# Patient Record
Sex: FEMALE | Race: OTHER
[De-identification: names, ages, dates, MRNs, and addresses within clinical notes are randomized per-mention and may not be internally consistent; named-entity substitution may affect disease eponyms.]

---

## 2017-12-01 ENCOUNTER — HOSPITAL ENCOUNTER (EMERGENCY)
Dept: HOSPITAL 8 - ED | Age: 22
LOS: 1 days | Discharge: HOME | End: 2017-12-02
Payer: COMMERCIAL

## 2017-12-01 VITALS — WEIGHT: 195.99 LBS | BODY MASS INDEX: 33.46 KG/M2 | HEIGHT: 64 IN

## 2017-12-01 VITALS — DIASTOLIC BLOOD PRESSURE: 92 MMHG | SYSTOLIC BLOOD PRESSURE: 155 MMHG

## 2017-12-01 DIAGNOSIS — L05.91: Primary | ICD-10-CM

## 2017-12-01 PROCEDURE — 46050 I&D PERIANAL ABSCESS SUPFC: CPT

## 2017-12-01 PROCEDURE — 99284 EMERGENCY DEPT VISIT MOD MDM: CPT

## 2017-12-03 ENCOUNTER — HOSPITAL ENCOUNTER (EMERGENCY)
Dept: HOSPITAL 8 - ED | Age: 22
Discharge: HOME | End: 2017-12-03
Payer: COMMERCIAL

## 2017-12-03 VITALS — SYSTOLIC BLOOD PRESSURE: 134 MMHG | DIASTOLIC BLOOD PRESSURE: 83 MMHG

## 2017-12-03 VITALS — WEIGHT: 196.43 LBS | HEIGHT: 64 IN | BODY MASS INDEX: 33.54 KG/M2

## 2017-12-03 DIAGNOSIS — L05.01: Primary | ICD-10-CM

## 2017-12-03 PROCEDURE — 99283 EMERGENCY DEPT VISIT LOW MDM: CPT

## 2018-08-20 ENCOUNTER — NON-PROVIDER VISIT (OUTPATIENT)
Dept: OBGYN | Facility: CLINIC | Age: 23
End: 2018-08-20

## 2018-08-20 DIAGNOSIS — Z32.01 PREGNANCY EXAMINATION OR TEST, POSITIVE RESULT: ICD-10-CM

## 2018-08-20 LAB
INT CON NEG: NEGATIVE
INT CON POS: POSITIVE
POC URINE PREGNANCY TEST: POSITIVE

## 2018-08-20 PROCEDURE — 81025 URINE PREGNANCY TEST: CPT | Performed by: OBSTETRICS & GYNECOLOGY

## 2018-08-28 ENCOUNTER — APPOINTMENT (OUTPATIENT)
Dept: OBGYN | Facility: CLINIC | Age: 23
End: 2018-08-28

## 2018-09-27 ENCOUNTER — OFFICE VISIT (OUTPATIENT)
Dept: URGENT CARE | Facility: CLINIC | Age: 23
End: 2018-09-27
Payer: COMMERCIAL

## 2018-09-27 VITALS
SYSTOLIC BLOOD PRESSURE: 132 MMHG | TEMPERATURE: 99.5 F | HEIGHT: 64 IN | BODY MASS INDEX: 35.51 KG/M2 | WEIGHT: 208 LBS | RESPIRATION RATE: 16 BRPM | DIASTOLIC BLOOD PRESSURE: 74 MMHG | OXYGEN SATURATION: 98 % | HEART RATE: 116 BPM

## 2018-09-27 DIAGNOSIS — L05.91 PILONIDAL CYST: ICD-10-CM

## 2018-09-27 PROCEDURE — 10060 I&D ABSCESS SIMPLE/SINGLE: CPT | Performed by: PHYSICIAN ASSISTANT

## 2018-09-27 RX ORDER — AMOXICILLIN AND CLAVULANATE POTASSIUM 875; 125 MG/1; MG/1
1 TABLET, FILM COATED ORAL 2 TIMES DAILY
Qty: 14 TAB | Refills: 0 | Status: SHIPPED | OUTPATIENT
Start: 2018-09-27 | End: 2018-10-04

## 2018-09-27 RX ORDER — ACETAMINOPHEN 500 MG
1000 TABLET ORAL ONCE
Status: COMPLETED | OUTPATIENT
Start: 2018-09-27 | End: 2018-09-27

## 2018-09-27 RX ADMIN — Medication 1000 MG: at 17:40

## 2018-09-27 ASSESSMENT — ENCOUNTER SYMPTOMS
PALPITATIONS: 0
ROS SKIN COMMENTS: INFECTED CYST
SHORTNESS OF BREATH: 0
FEVER: 0
COUGH: 0

## 2018-09-27 ASSESSMENT — PATIENT HEALTH QUESTIONNAIRE - PHQ9: CLINICAL INTERPRETATION OF PHQ2 SCORE: 0

## 2018-09-28 ENCOUNTER — TELEPHONE (OUTPATIENT)
Dept: OBGYN | Facility: CLINIC | Age: 23
End: 2018-09-28

## 2018-09-28 NOTE — PROGRESS NOTES
"Subjective:      Komal Hall is a 23 y.o. female who presents with Cyst (x4days, lower back, Pt. states she has had this in the same place over 10x.)            Cyst   This is a new problem. The current episode started in the past 7 days. The problem occurs daily. The problem has been unchanged. Pertinent negatives include no chest pain, coughing or fever. Nothing aggravates the symptoms. She has tried nothing for the symptoms.       Review of Systems   Constitutional: Negative for fever and malaise/fatigue.   Respiratory: Negative for cough and shortness of breath.    Cardiovascular: Negative for chest pain and palpitations.   Skin:        Infected cyst     All other systems reviewed and are negative.    PMH:  has no past medical history on file.  MEDS:   Current Outpatient Prescriptions:   •  amoxicillin-clavulanate (AUGMENTIN) 875-125 MG Tab, Take 1 Tab by mouth 2 times a day for 7 days., Disp: 14 Tab, Rfl: 0  •  hydrocodone-acetaminophen (NORCO) 5-325 MG Tab per tablet, Take 1-2 Tabs by mouth every 6 hours as needed., Disp: 20 Tab, Rfl: 0  •  cephALEXin (KEFLEX) 500 MG CAPS, Take 1 Cap by mouth 4 times a day., Disp: 40 Cap, Rfl: 0  •  hydrocodone-ibuprofen (VICOPROFEN) 7.5-200 MG per tablet, Take 1 Tab by mouth every 6 hours as needed for Severe Pain. Take with food, Disp: 20 Tab, Rfl: 0  ALLERGIES: No Known Allergies  SURGHX: History reviewed. No pertinent surgical history.  SOCHX:  reports that she has never smoked. She has never used smokeless tobacco. She reports that she does not drink alcohol or use drugs.  FH: Family history was reviewed, no pertinent findings to report  Medications, Allergies, and current problem list reviewed today in Epic       Objective:     /74 (BP Location: Right arm, Patient Position: Sitting, BP Cuff Size: Adult)   Pulse (!) 116   Temp 37.5 °C (99.5 °F) (Temporal)   Resp 16   Ht 1.626 m (5' 4\")   Wt 94.3 kg (208 lb)   SpO2 98%   BMI 35.70 kg/m²  " "    Physical Exam   Constitutional: She appears well-developed and well-nourished.   Cardiovascular: Normal rate, regular rhythm and normal heart sounds.    Pulmonary/Chest: Effort normal and breath sounds normal.   Skin: Skin is warm and dry. There is erythema.   Cyst above gluteal cleft     Psychiatric: She has a normal mood and affect. Her behavior is normal. Judgment and thought content normal.   Vitals reviewed.              Assessment/Plan:   Procedure: Incision and Drainage  -Risks, benefits, and alternatives discussed. Risks including infection, bleeding, nerve damage, and poor cosmetic outcome  -Sterile technique throughout  -Local anesthesia with 2% lidocaine with epinephrine  -Incision with #11 blade into fluctuant area with purulent material expressed  -Culture obtained and packaged for lab  -Cavity probed and any loculations bluntly taken down with hemostat  -Irrigated copiously with NS  -Packed with 1/4\" gauze  -Minimal bleeding with good hemostasis achieved  -The patient tolerated the procedure well    1. Pilonidal cyst    - amoxicillin-clavulanate (AUGMENTIN) 875-125 MG Tab; Take 1 Tab by mouth 2 times a day for 7 days.  Dispense: 14 Tab; Refill: 0  - acetaminophen (TYLENOL) tablet 1,000 mg; Take 2 Tabs by mouth Once.  - Follow up in 2 days for recheck.    Differential diagnosis, natural history, supportive care discussed. Follow-up with primary care provider within 7-10 days, emergency room precautions discussed.  Patient and/or family appears understanding of information.  Handout and review of patients diagnosis and treatment was discussed extensively.     "

## 2018-09-28 NOTE — TELEPHONE ENCOUNTER
Pt called asking about advice on medication she was prescribed. She didn't mention the name of it. I called her back and left a vm to call us again.

## 2018-10-02 ENCOUNTER — INITIAL PRENATAL (OUTPATIENT)
Dept: OBGYN | Facility: CLINIC | Age: 23
End: 2018-10-02
Payer: COMMERCIAL

## 2018-10-02 ENCOUNTER — HOSPITAL ENCOUNTER (OUTPATIENT)
Facility: MEDICAL CENTER | Age: 23
End: 2018-10-02
Attending: PHYSICIAN ASSISTANT
Payer: COMMERCIAL

## 2018-10-02 VITALS — BODY MASS INDEX: 36.05 KG/M2 | DIASTOLIC BLOOD PRESSURE: 82 MMHG | WEIGHT: 210 LBS | SYSTOLIC BLOOD PRESSURE: 122 MMHG

## 2018-10-02 DIAGNOSIS — Z34.02 SUPERVISION OF NORMAL FIRST PREGNANCY IN SECOND TRIMESTER: Primary | ICD-10-CM

## 2018-10-02 DIAGNOSIS — L05.91 PILONIDAL CYST: ICD-10-CM

## 2018-10-02 DIAGNOSIS — Z34.02 SUPERVISION OF NORMAL FIRST PREGNANCY IN SECOND TRIMESTER: ICD-10-CM

## 2018-10-02 LAB
APPEARANCE UR: NORMAL
BILIRUB UR STRIP-MCNC: NORMAL MG/DL
COLOR UR AUTO: NORMAL
GLUCOSE UR STRIP.AUTO-MCNC: NEGATIVE MG/DL
KETONES UR STRIP.AUTO-MCNC: NEGATIVE MG/DL
LEUKOCYTE ESTERASE UR QL STRIP.AUTO: NEGATIVE
NITRITE UR QL STRIP.AUTO: NEGATIVE
PH UR STRIP.AUTO: 6 [PH] (ref 5–8)
PROT UR QL STRIP: NEGATIVE MG/DL
RBC UR QL AUTO: NEGATIVE
SP GR UR STRIP.AUTO: 1.02
UROBILINOGEN UR STRIP-MCNC: NORMAL MG/DL

## 2018-10-02 PROCEDURE — 59401 PR NEW OB VISIT: CPT | Performed by: PHYSICIAN ASSISTANT

## 2018-10-02 PROCEDURE — 81002 URINALYSIS NONAUTO W/O SCOPE: CPT | Performed by: PHYSICIAN ASSISTANT

## 2018-10-02 PROCEDURE — 87591 N.GONORRHOEAE DNA AMP PROB: CPT

## 2018-10-02 PROCEDURE — 87491 CHLMYD TRACH DNA AMP PROBE: CPT

## 2018-10-02 NOTE — LETTER
Cystic Fibrosis Carrier Testing  Komal Hall    The following information is about a blood test that can be done to determine if you and/or your partner carry the gene for cystic fibrosis.    WHAT IS CYSTIC FIBROSIS?  · Cystic fibrosis (CF) is an inherited disease that affects more than 25,000 American children and young adults.  · Symptoms of CF vary but include lung congestion, pneumonia, diarrhea and poor growth.  Most people with CF have severe medical problems and some die at a young age.  Others have so few symptoms they are unaware they have CF.  · CF does not affect intelligence.  · Although there is no cure for CF at this time, scientists are making progress in improving treatment and in searching for a cure.  In the past many people with CF  at a very young age.  Today, many are living into their 20’s and 30’s.    IS THERE A CHANCE MY BABY COULD HAVE CYSTIC FIBROSIS?  · You can have a child with CF even if there is no history in your family (see chart below).  · CF testing can help determine if you are a carrier and at risk to have a child with CF.  Note: if both parents are carriers, there is a 1 in 4 (25%) chance with each pregnancy that they will have a child with CF.  · Carriers have one normal CF gene and one altered CF gene.  · People with CF have two altered CF genes.  · Most people have two normal copies of the CF gene.    Approximate risk that a couple with no family history of cystic fibrosis will have a child with cystic fibrosis:    Ethnic background / Risk     couple:  1 in 2,500   couple:  1 in 15,000            couple:  1 in 8,000     American couple:  1 in 32,000     WHAT TESTING IS AVAILABLE?  · There is a blood test that can be done to find out if you or your partner is a carrier.  · It is important to understand that CF carrier testing does not detect all CF carriers.  · If the test shows that you are both CF carriers, you unborn  baby can be tested to find out if the baby has CF.    HOW MUCH DOES IT COST TO HAVE CYSTIC FIBROSIS CARRIER TESTING?  · Cost and insurance coverage for CF carrier testing vary depending upon the laboratory used and your insurance policy.  · The average cost for CF carrier testing is $300 per person.  · Your genetic counselor can provide you with more information about cystic fibrosis carrier testing.    _____  Yes, I am interested in discussing carrier testing with a genetic counselor.    _____  No, I am not interested in CF carrier testing or in receiving more information about CF carrier testing.      Client signature: ________________________________________  10/2/2018

## 2018-10-02 NOTE — PROGRESS NOTES
Subjective:      Komal Hall is a 23 y.o. female who presents with new ob visit. Pt sure of LMP. Supportive FOB, Luis. First pregnancy. Pt has hx of pilonidal cysts, last drainage 3 days ago, and pt was instructed to take Augmentin, but wanted to wait until visit today - instructed pt to take, though no signs of infection. Pt last I&D was 7 mo ago. Pt denies other PMHx, SHx. No tob, etoh or drug use. NKDA. Taking PNV and will start Amoxicillin. Pt currently denies cramping, bleeding or pain. No FM.     Last PAP wnl 8/17 - records requested from Dr Frank.    PE: See below. Size c/w dates. +FHT by Doppler.    A/P: 1. IUP at 13w3d - GC/CT today. PNL slip given. US 5-7wk. RTC 4 wks.  2. Pilondial cysts - take Augmentin            HPI    ROS       Objective:     /82   Wt 95.3 kg (210 lb)   LMP 06/30/2018   BMI 36.05 kg/m²      Physical Exam   Constitutional: She appears well-developed and well-nourished.   HENT:   Head: Normocephalic and atraumatic.   Eyes: Pupils are equal, round, and reactive to light.   Neck: Normal range of motion. Neck supple. No thyromegaly present.   Cardiovascular: Normal rate, regular rhythm and normal heart sounds.    Pulmonary/Chest: Effort normal and breath sounds normal. No respiratory distress.   Abdominal: Soft. Bowel sounds are normal. She exhibits no distension. There is no tenderness.   Genitourinary: Vagina normal. Pelvic exam was performed with patient supine. There is no rash or tenderness on the right labia. There is no rash or tenderness on the left labia. Uterus is enlarged (Gravid, uterus c/w 13-14wk size). Uterus is not deviated and not tender. Cervix exhibits no motion tenderness. Right adnexum displays no mass and no tenderness. Left adnexum displays no mass and no tenderness. No erythema in the vagina. No foreign body in the vagina. No signs of injury around the vagina. No vaginal discharge found.   Neurological: She is alert. She has normal  reflexes.   Skin: Skin is warm and dry. No erythema.   Psychiatric: She has a normal mood and affect. Her behavior is normal. Thought content normal.   Vitals reviewed.              Assessment/Plan:     1. Supervision of normal first pregnancy in second trimester  - PREG CNTR PRENATAL PN; Future  - CHLAMYDIA/GC PCR URINE OR SWAB; Future  - US-OB 2ND 3RD TRI COMPLETE; Future  - POCT Urinalysis  - F/u 4 wk, US 4-6wk    2. Pilonidal cyst  - s/p drainage 3 days ago  - take Augmentin per MD

## 2018-10-02 NOTE — PROGRESS NOTES
Pt. Here for NOB visit today.  #[phone]  First prenatal care  Pt. states nausea and vomiting  Pharmacy verified  Pt desires AFP  PNP labs today  Chaperone offered and present

## 2018-10-03 LAB
C TRACH DNA SPEC QL NAA+PROBE: NEGATIVE
N GONORRHOEA DNA SPEC QL NAA+PROBE: NEGATIVE
SPECIMEN SOURCE: NORMAL

## 2018-10-24 ENCOUNTER — ROUTINE PRENATAL (OUTPATIENT)
Dept: OBGYN | Facility: CLINIC | Age: 23
End: 2018-10-24
Payer: COMMERCIAL

## 2018-10-24 VITALS — BODY MASS INDEX: 36.9 KG/M2 | DIASTOLIC BLOOD PRESSURE: 64 MMHG | SYSTOLIC BLOOD PRESSURE: 118 MMHG | WEIGHT: 215 LBS

## 2018-10-24 DIAGNOSIS — Z34.02 SUPERVISION OF NORMAL FIRST PREGNANCY IN SECOND TRIMESTER: ICD-10-CM

## 2018-10-24 PROCEDURE — 90040 PR PRENATAL FOLLOW UP: CPT | Performed by: PHYSICIAN ASSISTANT

## 2018-10-24 NOTE — PROGRESS NOTES
Pt here today for OB follow up  Pt states no complaints   Reports +FM  Good # [phone]  Pharmacy Confirmed.  Pt given AFP lab slip. PNP re printed and advised to do ASAP

## 2018-10-24 NOTE — PROGRESS NOTES
Pt has no complaints with cramping, bleeding or pain though pt has occ side pain if she isnt drinking enough water, so has incr water intake and feels better. No FM yet. GC/CT wnl - pt notified of results. PAP results never received from [facility], so MA to call today. Pt urged to do PNL asap, AFP slip given today with instructions and pt to do all labs by next week. US to be done on 11/19. Forgot to ask about flu vaccine - will reask next visit. RTC 4 wk or sooner prn. Has Centering appt 11/19.

## 2018-10-25 ENCOUNTER — HOSPITAL ENCOUNTER (OUTPATIENT)
Dept: LAB | Facility: MEDICAL CENTER | Age: 23
End: 2018-10-25
Attending: PHYSICIAN ASSISTANT
Payer: COMMERCIAL

## 2018-10-25 DIAGNOSIS — Z34.02 SUPERVISION OF NORMAL FIRST PREGNANCY IN SECOND TRIMESTER: ICD-10-CM

## 2018-10-25 LAB
ABO GROUP BLD: NORMAL
BACTERIA #/AREA URNS HPF: ABNORMAL /HPF
BLD GP AB SCN SERPL QL: NORMAL
EPI CELLS #/AREA URNS HPF: ABNORMAL /HPF
HIV 1+2 AB+HIV1 P24 AG SERPL QL IA: NON REACTIVE
HYALINE CASTS #/AREA URNS LPF: ABNORMAL /LPF
RBC # URNS HPF: ABNORMAL /HPF
RH BLD: NORMAL
WBC #/AREA URNS HPF: ABNORMAL /HPF

## 2018-10-25 PROCEDURE — 86850 RBC ANTIBODY SCREEN: CPT

## 2018-10-25 PROCEDURE — 86780 TREPONEMA PALLIDUM: CPT

## 2018-10-25 PROCEDURE — 86900 BLOOD TYPING SEROLOGIC ABO: CPT

## 2018-10-25 PROCEDURE — 85025 COMPLETE CBC W/AUTO DIFF WBC: CPT

## 2018-10-25 PROCEDURE — 86762 RUBELLA ANTIBODY: CPT

## 2018-10-25 PROCEDURE — 36415 COLL VENOUS BLD VENIPUNCTURE: CPT

## 2018-10-25 PROCEDURE — 86901 BLOOD TYPING SEROLOGIC RH(D): CPT

## 2018-10-25 PROCEDURE — 81511 FTL CGEN ABNOR FOUR ANAL: CPT

## 2018-10-25 PROCEDURE — 81001 URINALYSIS AUTO W/SCOPE: CPT

## 2018-10-25 PROCEDURE — 87389 HIV-1 AG W/HIV-1&-2 AB AG IA: CPT

## 2018-10-25 PROCEDURE — 87086 URINE CULTURE/COLONY COUNT: CPT

## 2018-10-25 PROCEDURE — 87340 HEPATITIS B SURFACE AG IA: CPT

## 2018-10-26 LAB
APPEARANCE UR: ABNORMAL
BASOPHILS # BLD AUTO: 0.3 % (ref 0–1.8)
BASOPHILS # BLD: 0.03 K/UL (ref 0–0.12)
BILIRUB UR QL STRIP.AUTO: NEGATIVE
COLOR UR: YELLOW
EOSINOPHIL # BLD AUTO: 0.07 K/UL (ref 0–0.51)
EOSINOPHIL NFR BLD: 0.6 % (ref 0–6.9)
ERYTHROCYTE [DISTWIDTH] IN BLOOD BY AUTOMATED COUNT: 42.8 FL (ref 35.9–50)
GLUCOSE UR STRIP.AUTO-MCNC: NEGATIVE MG/DL
HBV SURFACE AG SER QL: NEGATIVE
HCT VFR BLD AUTO: 40.2 % (ref 37–47)
HGB BLD-MCNC: 13.4 G/DL (ref 12–16)
IMM GRANULOCYTES # BLD AUTO: 0.05 K/UL (ref 0–0.11)
IMM GRANULOCYTES NFR BLD AUTO: 0.5 % (ref 0–0.9)
KETONES UR STRIP.AUTO-MCNC: NEGATIVE MG/DL
LEUKOCYTE ESTERASE UR QL STRIP.AUTO: NEGATIVE
LYMPHOCYTES # BLD AUTO: 1.57 K/UL (ref 1–4.8)
LYMPHOCYTES NFR BLD: 14.1 % (ref 22–41)
MCH RBC QN AUTO: 28.1 PG (ref 27–33)
MCHC RBC AUTO-ENTMCNC: 33.3 G/DL (ref 33.6–35)
MCV RBC AUTO: 84.3 FL (ref 81.4–97.8)
MICRO URNS: ABNORMAL
MONOCYTES # BLD AUTO: 0.41 K/UL (ref 0–0.85)
MONOCYTES NFR BLD AUTO: 3.7 % (ref 0–13.4)
NEUTROPHILS # BLD AUTO: 8.98 K/UL (ref 2–7.15)
NEUTROPHILS NFR BLD: 80.8 % (ref 44–72)
NITRITE UR QL STRIP.AUTO: NEGATIVE
NRBC # BLD AUTO: 0 K/UL
NRBC BLD-RTO: 0 /100 WBC
PH UR STRIP.AUTO: 7 [PH]
PLATELET # BLD AUTO: 336 K/UL (ref 164–446)
PMV BLD AUTO: 11.6 FL (ref 9–12.9)
PROT UR QL STRIP: NEGATIVE MG/DL
RBC # BLD AUTO: 4.77 M/UL (ref 4.2–5.4)
RBC UR QL AUTO: NEGATIVE
RUBV AB SER QL: 11.5 IU/ML
SP GR UR STRIP.AUTO: 1.02
TREPONEMA PALLIDUM IGG+IGM AB [PRESENCE] IN SERUM OR PLASMA BY IMMUNOASSAY: NON REACTIVE
UROBILINOGEN UR STRIP.AUTO-MCNC: 0.2 MG/DL
WBC # BLD AUTO: 11.1 K/UL (ref 4.8–10.8)

## 2018-10-27 LAB
BACTERIA UR CULT: NORMAL
SIGNIFICANT IND 70042: NORMAL
SITE SITE: NORMAL
SOURCE SOURCE: NORMAL

## 2018-10-28 LAB
# FETUSES US: NORMAL
AFP MOM SERPL: 1.16
AFP SERPL-MCNC: 33 NG/ML
AGE - REPORTED: 22.7 YR
CURRENT SMOKER: NO
FAMILY MEMBER DISEASES HX: NO
GA METHOD: NORMAL
GA: NORMAL WK
HCG MOM SERPL: 2.49
HCG SERPL-ACNC: NORMAL IU/L
HX OF HEREDITARY DISORDERS: NO
IDDM PATIENT QL: NO
INHIBIN A MOM SERPL: 1.82
INHIBIN A SERPL-MCNC: 257 PG/ML
INTEGRATED SCN PATIENT-IMP: NORMAL
PATHOLOGY STUDY: NORMAL
SPECIMEN DRAWN SERPL: NORMAL
U ESTRIOL MOM SERPL: 0.54
U ESTRIOL SERPL-MCNC: 0.51 NG/ML

## 2018-11-19 ENCOUNTER — ROUTINE PRENATAL (OUTPATIENT)
Dept: OBGYN | Facility: CLINIC | Age: 23
End: 2018-11-19
Payer: COMMERCIAL

## 2018-11-19 ENCOUNTER — HOSPITAL ENCOUNTER (OUTPATIENT)
Facility: MEDICAL CENTER | Age: 23
End: 2018-11-19
Attending: NURSE PRACTITIONER
Payer: COMMERCIAL

## 2018-11-19 VITALS — DIASTOLIC BLOOD PRESSURE: 82 MMHG | SYSTOLIC BLOOD PRESSURE: 144 MMHG

## 2018-11-19 DIAGNOSIS — L05.91 PILONIDAL CYST: ICD-10-CM

## 2018-11-19 DIAGNOSIS — Z34.02 SUPERVISION OF NORMAL FIRST PREGNANCY IN SECOND TRIMESTER: Primary | ICD-10-CM

## 2018-11-19 DIAGNOSIS — O16.2 ELEVATED BLOOD PRESSURE AFFECTING PREGNANCY IN SECOND TRIMESTER, ANTEPARTUM: ICD-10-CM

## 2018-11-19 PROCEDURE — 84156 ASSAY OF PROTEIN URINE: CPT

## 2018-11-19 PROCEDURE — 81050 URINALYSIS VOLUME MEASURE: CPT

## 2018-11-19 PROCEDURE — 90040 PR PRENATAL FOLLOW UP: CPT | Performed by: NURSE PRACTITIONER

## 2018-11-20 ENCOUNTER — DATING (OUTPATIENT)
Dept: OBGYN | Facility: MEDICAL CENTER | Age: 23
End: 2018-11-20

## 2018-11-20 ENCOUNTER — HOSPITAL ENCOUNTER (OUTPATIENT)
Dept: LAB | Facility: MEDICAL CENTER | Age: 23
End: 2018-11-20
Attending: NURSE PRACTITIONER
Payer: COMMERCIAL

## 2018-11-20 ENCOUNTER — APPOINTMENT (OUTPATIENT)
Dept: RADIOLOGY | Facility: IMAGING CENTER | Age: 23
End: 2018-11-20
Attending: PHYSICIAN ASSISTANT
Payer: COMMERCIAL

## 2018-11-20 DIAGNOSIS — O16.2 ELEVATED BLOOD PRESSURE AFFECTING PREGNANCY IN SECOND TRIMESTER, ANTEPARTUM: ICD-10-CM

## 2018-11-20 DIAGNOSIS — Z34.02 SUPERVISION OF NORMAL FIRST PREGNANCY IN SECOND TRIMESTER: ICD-10-CM

## 2018-11-20 LAB
AST SERPL-CCNC: 13 U/L (ref 12–45)
BUN SERPL-MCNC: 7 MG/DL (ref 8–22)
CREAT SERPL-MCNC: 0.4 MG/DL (ref 0.5–1.4)
ERYTHROCYTE [DISTWIDTH] IN BLOOD BY AUTOMATED COUNT: 44.3 FL (ref 35.9–50)
HCT VFR BLD AUTO: 37.1 % (ref 37–47)
HGB BLD-MCNC: 11.8 G/DL (ref 12–16)
MCH RBC QN AUTO: 27.3 PG (ref 27–33)
MCHC RBC AUTO-ENTMCNC: 31.8 G/DL (ref 33.6–35)
MCV RBC AUTO: 85.7 FL (ref 81.4–97.8)
PLATELET # BLD AUTO: 344 K/UL (ref 164–446)
PMV BLD AUTO: 11.7 FL (ref 9–12.9)
PROT 24H UR-MCNC: 164.3 MG/24 HR (ref 30–150)
PROT 24H UR-MRATE: 9 MG/DL (ref 0–15)
RBC # BLD AUTO: 4.33 M/UL (ref 4.2–5.4)
SPECIMEN VOL UR: 1825 ML
URATE SERPL-MCNC: 3.8 MG/DL (ref 1.9–8.2)
WBC # BLD AUTO: 11 K/UL (ref 4.8–10.8)

## 2018-11-20 PROCEDURE — 76805 OB US >/= 14 WKS SNGL FETUS: CPT | Mod: 26 | Performed by: PHYSICIAN ASSISTANT

## 2018-11-20 PROCEDURE — 76805 OB US >/= 14 WKS SNGL FETUS: CPT | Performed by: OBSTETRICS & GYNECOLOGY

## 2018-11-28 ENCOUNTER — NON-PROVIDER VISIT (OUTPATIENT)
Dept: OBGYN | Facility: CLINIC | Age: 23
End: 2018-11-28
Payer: COMMERCIAL

## 2018-11-28 VITALS — DIASTOLIC BLOOD PRESSURE: 70 MMHG | SYSTOLIC BLOOD PRESSURE: 118 MMHG

## 2018-11-28 NOTE — PROGRESS NOTES
Patient here for BP check.   Patient was position in th sitting positing, feet flat on the stool. BP was taken on the the rt side. BP: 118/70.    A second BP was taken by Betty RESENDEZ MA, 10 min later. Her reading was 122/70.    Conuslt with Dr Rojo, per MD BP looks good and labs are WNL, patient is ok to go home.

## 2018-12-16 NOTE — PROGRESS NOTES
S:  Pt is  at 24w0d YUE: Estimated Date of Delivery: 19 here for Centering session #3.  No concerns today. States she has been taking her BP at home and it is always normal. Also says she is continuing to take ASA 81 mg daily.  Reports positive FM.  Denies VB, RUCs, LOF or vaginal DC.    O:  Please see above vitals.        FHTs: 141        Fundal ht: 24cm        PIH labs wnl, 24 hr urine: prh931.3 - reviewed w pt    A:  IUP at 24w0d  Patient Active Problem List    Diagnosis Date Noted   • Supervision of normal first pregnancy in second trimester 10/02/2018   • Pilonidal cyst s/p drainage 9/28/18 10/02/2018       P:  1.   Reviewed labs/US w pt.        2.   Questions answered.          3.   F/u 4wks Centering session #4        4.   1 hr GTT, H/H, T. Pallidum ordered, lab slips to pt    Centering Topics Reviewed:   1.  Mental relaxation  2.  Breastfeeding  3.  The Family I want to have  4.  Other topics: fetal movement.

## 2018-12-17 ENCOUNTER — ROUTINE PRENATAL (OUTPATIENT)
Dept: OBGYN | Facility: CLINIC | Age: 23
End: 2018-12-17

## 2018-12-17 VITALS — WEIGHT: 220 LBS | SYSTOLIC BLOOD PRESSURE: 142 MMHG | BODY MASS INDEX: 37.76 KG/M2 | DIASTOLIC BLOOD PRESSURE: 84 MMHG

## 2018-12-17 DIAGNOSIS — Z34.02 SUPERVISION OF NORMAL FIRST PREGNANCY IN SECOND TRIMESTER: Primary | ICD-10-CM

## 2018-12-17 DIAGNOSIS — L05.91 PILONIDAL CYST: ICD-10-CM

## 2018-12-17 PROCEDURE — 90040 PR PRENATAL FOLLOW UP: CPT | Performed by: NURSE PRACTITIONER

## 2019-01-02 ENCOUNTER — HOSPITAL ENCOUNTER (OUTPATIENT)
Dept: LAB | Facility: MEDICAL CENTER | Age: 24
End: 2019-01-02
Attending: NURSE PRACTITIONER
Payer: COMMERCIAL

## 2019-01-02 DIAGNOSIS — Z34.02 SUPERVISION OF NORMAL FIRST PREGNANCY IN SECOND TRIMESTER: ICD-10-CM

## 2019-01-02 LAB
GLUCOSE 1H P 50 G GLC PO SERPL-MCNC: 108 MG/DL (ref 70–139)
HCT VFR BLD AUTO: 39.3 % (ref 37–47)
HGB BLD-MCNC: 12.5 G/DL (ref 12–16)
TREPONEMA PALLIDUM IGG+IGM AB [PRESENCE] IN SERUM OR PLASMA BY IMMUNOASSAY: NON REACTIVE

## 2019-01-02 PROCEDURE — 85014 HEMATOCRIT: CPT

## 2019-01-02 PROCEDURE — 82950 GLUCOSE TEST: CPT

## 2019-01-02 PROCEDURE — 36415 COLL VENOUS BLD VENIPUNCTURE: CPT

## 2019-01-02 PROCEDURE — 86780 TREPONEMA PALLIDUM: CPT

## 2019-01-02 PROCEDURE — 85018 HEMOGLOBIN: CPT

## 2019-01-12 NOTE — PROGRESS NOTES
S:  Pt is  at 27w6d for Centering Session #4.  No concerns today.  Reports good FM.  Denies VB, LOF, RUCs or vaginal DC.    O:  Please see above vitals.        FHTs: 150        Fundal ht: 28cm    A:  IUP at 27w6d  Patient Active Problem List    Diagnosis Date Noted   • Supervision of normal first pregnancy in second trimester 10/02/2018   • Pilonidal cyst s/p drainage 9/28/18 10/02/2018        P:  1.  Declines BTL.          2.  Questions answered.          3.  Encourage adequate water intake        4.  F/u 2wks for Centering session #5        5.  TDAP today         6.  Instructions given on FKCs.          7.  Follow up in 2 wks    Centering Topics Reviewed:  1.  Thinking about my family  2.  Family planning  3.  Sexuality  4.  Domestic violence and abuse  5.  Fetal brain development  6.   labor  7.  Other topics: kick count, glucose screening, TDAP

## 2019-01-14 ENCOUNTER — ROUTINE PRENATAL (OUTPATIENT)
Dept: OBGYN | Facility: CLINIC | Age: 24
End: 2019-01-14

## 2019-01-14 VITALS — WEIGHT: 224 LBS | SYSTOLIC BLOOD PRESSURE: 124 MMHG | DIASTOLIC BLOOD PRESSURE: 84 MMHG | BODY MASS INDEX: 38.45 KG/M2

## 2019-01-14 DIAGNOSIS — L05.91 PILONIDAL CYST: ICD-10-CM

## 2019-01-14 DIAGNOSIS — Z34.02 SUPERVISION OF NORMAL FIRST PREGNANCY IN SECOND TRIMESTER: ICD-10-CM

## 2019-01-14 DIAGNOSIS — Z34.03 ENCOUNTER FOR SUPERVISION OF NORMAL FIRST PREGNANCY IN THIRD TRIMESTER: Primary | ICD-10-CM

## 2019-01-14 PROCEDURE — 90715 TDAP VACCINE 7 YRS/> IM: CPT | Performed by: NURSE PRACTITIONER

## 2019-01-14 PROCEDURE — 90040 PR PRENATAL FOLLOW UP: CPT | Performed by: NURSE PRACTITIONER

## 2019-01-14 PROCEDURE — 90471 IMMUNIZATION ADMIN: CPT | Performed by: NURSE PRACTITIONER

## 2019-01-14 NOTE — LETTER
"Count Your Baby's Movements  Another step to a healthy delivery    A Epic Dress Re Test             Dept: [phone]    How Many Weeks Pregnant 28w2d    Date to Begin Countin19              How to use this chart    One way for your physician to keep track of your baby's health is by knowing how often the baby moves (or \"kicks\") in your womb.  You can help your physician to do this by using this chart every day.    Every day, you should see how many hours it takes for your baby to move 10 times.  Start in the morning, as soon as you get up.    · First, write down the time your baby moves until you get to 10.  · Check off one box every time your baby moves until you get to 10.  · Write down the time you finished counting in the last column.  · Total how long it took to count up all 10 movements.  · Finally, fill in the box that shows how long this took.  After counting 10 movements, you no longer have to count any more that day.  The next morning, just start counting again as soon as you get up.    What should you call a \"movement\"?  It is hard to say, because it will feel different from one mother to another and from one pregnancy to the next.  The important thing is that you count the movements the same way throughout your pregnancy.  If you have more questions, you should ask your physician.    Count carefully every day!  SAMPLE:  Week 28    How many hours did it take to feel 10 movements?       Start  Time     1     2     3     4     5     6     7     8     9     10   Finish Time   Mon 8:20 ·  ·  ·  ·  ·  ·  ·  ·  ·  ·  11:40                  Sat               Sun                 IMPORTANT: You should contact your physician if it takes more than two hours for you to feel 10 movements.  Each morning, write down the time and start to count the movements of your baby.  Keep track by checking off one box every time you feel one movement.  When you have " "felt 10 \"kicks\", write down the time you finished counting in the last column.  Then fill in the   box (over the check edgard) for the number of hours it took.  Be sure to read the complete instructions on the previous page.            "

## 2019-01-14 NOTE — PROGRESS NOTES
Tdap vaccine given today, right deltoid. Screening checklist reviewed with pt verified by Xuan TEJEDA

## 2019-01-28 ENCOUNTER — ROUTINE PRENATAL (OUTPATIENT)
Dept: OBGYN | Facility: CLINIC | Age: 24
End: 2019-01-28
Payer: COMMERCIAL

## 2019-01-28 VITALS — WEIGHT: 227 LBS | DIASTOLIC BLOOD PRESSURE: 76 MMHG | SYSTOLIC BLOOD PRESSURE: 132 MMHG | BODY MASS INDEX: 38.96 KG/M2

## 2019-01-28 DIAGNOSIS — O13.3 GESTATIONAL HYPERTENSION, THIRD TRIMESTER: ICD-10-CM

## 2019-01-28 DIAGNOSIS — Z34.03 SUPERVISION OF NORMAL FIRST PREGNANCY IN THIRD TRIMESTER: ICD-10-CM

## 2019-01-28 PROBLEM — Z34.02 SUPERVISION OF NORMAL FIRST PREGNANCY IN SECOND TRIMESTER: Status: RESOLVED | Noted: 2018-10-02 | Resolved: 2019-01-28

## 2019-01-28 PROCEDURE — 90040 PR PRENATAL FOLLOW UP: CPT | Performed by: ADVANCED PRACTICE MIDWIFE

## 2019-01-28 NOTE — PROGRESS NOTES
SUBJECTIVE:  Pt is a 23 y.o.   at 30w2d  gestation. Presents today for follow-up prenatal care. Has not been seen in ER or L & D since last visit. Reports good  fetal movement. Denies leaking of fluid dysuria, headaches, or N/V at this time.  Patient does not report cramping/contractions. Generally feels well today. Patient with questions about her blood pressure.    OBJECTIVE:  - See prenatal vitals flow  -   Vitals:    19 1026   BP: 132/76   Weight: 103 kg (227 lb)        Fundal Height: 32 cm  FHTs: 145             ASSESSMENT:   - IUP at 30w2d    - S=D   -   Patient Active Problem List    Diagnosis Date Noted   • Gestational hypertension, third trimester 2019   • Supervision of normal first pregnancy in third trimester 10/02/2018   • Pilonidal cyst s/p drainage 9/28/18 10/02/2018         PLAN:  - S/sx pregnancy and labor warning signs vs general discomforts discussed  - Fetal movements and kick counts reviewed. Adequate hydration reinforced  - Plans for  breastfeeding   - Anticipatory guidance provided in relationship to gestational hypertension. I reviewed baseline labs with patient and when repeat labs would be necessary. She is aware of the preeclampsia signs and symptoms. I reviewed with her also that early delivery might be necessary but will depend on her presentation at that time. She voices understanding of teaching. She reports that she will convey this information to her partner.    - RTC in 2 weeks for routine prenatal care.

## 2019-01-28 NOTE — PROGRESS NOTES
Pt here today for OB follow up  Pt states no complaints  Reports +FM  Good # [phone]  Pharmacy Confirmed.  Chaperone offered and provided.

## 2019-02-08 NOTE — PROGRESS NOTES
S:  Pt is  at 31w6d for Centering session #5. Reports swelling in radha ankles and feet. Says she has been taking BPs at home and they have been in 130s/70s. Discussed need for repeat labs for PIH Reports good FM.  Denies VB, LOF, RUCs or vaginal DC.    O:  Please see above vitals.        FHTs: 147        Fundal ht: 33cm           A:  IUP at 31w6d  Patient Active Problem List    Diagnosis Date Noted   • Gestational hypertension, third trimester 2019   • Supervision of normal first pregnancy in third trimester 10/02/2018   • Pilonidal cyst s/p drainage 9/28/18 10/02/2018        P:  1.  Declines BTL.          2.  Questions answered.          3.  Encouraged adequate water intake        4.  F/u 2wks Centering Session #6        5.  Continue FKCs.        6.  GBS at next visit        7.  PIH labs and 24 hr urine ordered    Centering Topics Reviewed:  1.  Labor  2.  Birth video  3.  Breathing  4.  Medications for labor & birth  5.  Early labor -- when to call  6.  Kick counts  7. Birth plans

## 2019-02-11 ENCOUNTER — ROUTINE PRENATAL (OUTPATIENT)
Dept: OBGYN | Facility: CLINIC | Age: 24
End: 2019-02-11

## 2019-02-11 VITALS — DIASTOLIC BLOOD PRESSURE: 86 MMHG | BODY MASS INDEX: 39.82 KG/M2 | WEIGHT: 232 LBS | SYSTOLIC BLOOD PRESSURE: 144 MMHG

## 2019-02-11 DIAGNOSIS — Z34.03 ENCOUNTER FOR SUPERVISION OF NORMAL FIRST PREGNANCY IN THIRD TRIMESTER: Primary | ICD-10-CM

## 2019-02-11 DIAGNOSIS — L05.91 PILONIDAL CYST: ICD-10-CM

## 2019-02-11 DIAGNOSIS — O13.3 GESTATIONAL HYPERTENSION, THIRD TRIMESTER: ICD-10-CM

## 2019-02-11 DIAGNOSIS — Z34.03 SUPERVISION OF NORMAL FIRST PREGNANCY IN THIRD TRIMESTER: ICD-10-CM

## 2019-02-11 PROCEDURE — 90040 PR PRENATAL FOLLOW UP: CPT | Performed by: NURSE PRACTITIONER

## 2019-02-13 ENCOUNTER — HOSPITAL ENCOUNTER (OUTPATIENT)
Dept: LAB | Facility: MEDICAL CENTER | Age: 24
End: 2019-02-13
Attending: NURSE PRACTITIONER
Payer: COMMERCIAL

## 2019-02-13 DIAGNOSIS — O13.3 GESTATIONAL HYPERTENSION, THIRD TRIMESTER: ICD-10-CM

## 2019-02-13 LAB
AST SERPL-CCNC: 14 U/L (ref 12–45)
BUN SERPL-MCNC: 5 MG/DL (ref 8–22)
CREAT SERPL-MCNC: 0.46 MG/DL (ref 0.5–1.4)
ERYTHROCYTE [DISTWIDTH] IN BLOOD BY AUTOMATED COUNT: 45.9 FL (ref 35.9–50)
HCT VFR BLD AUTO: 40.4 % (ref 37–47)
HGB BLD-MCNC: 13 G/DL (ref 12–16)
MCH RBC QN AUTO: 27.3 PG (ref 27–33)
MCHC RBC AUTO-ENTMCNC: 32.2 G/DL (ref 33.6–35)
MCV RBC AUTO: 84.9 FL (ref 81.4–97.8)
PLATELET # BLD AUTO: 216 K/UL (ref 164–446)
PMV BLD AUTO: 12.8 FL (ref 9–12.9)
PROT 24H UR-MCNC: 207 MG/24 HR (ref 30–150)
PROT 24H UR-MRATE: 11.5 MG/DL (ref 0–15)
RBC # BLD AUTO: 4.76 M/UL (ref 4.2–5.4)
SPECIMEN VOL UR: 1800 ML
URATE SERPL-MCNC: 4.8 MG/DL (ref 1.9–8.2)
WBC # BLD AUTO: 10.3 K/UL (ref 4.8–10.8)

## 2019-02-15 ENCOUNTER — NON-PROVIDER VISIT (OUTPATIENT)
Dept: OBGYN | Facility: CLINIC | Age: 24
End: 2019-02-15
Payer: COMMERCIAL

## 2019-02-15 VITALS — DIASTOLIC BLOOD PRESSURE: 88 MMHG | SYSTOLIC BLOOD PRESSURE: 124 MMHG

## 2019-02-15 RX ORDER — ASPIRIN 81 MG/1
81 TABLET, CHEWABLE ORAL DAILY
Status: ON HOLD | COMMUNITY
End: 2019-03-04

## 2019-02-15 NOTE — NON-PROVIDER
Patient here for BP check. States having cold symptoms, denies fever. No other symptoms. She is not taking any BP meds, only on baby aspirin.  124/88. Consulted with provider Haily Weir CNM, she advised to review with patient preeclampsia precautions, keep appt as scheduled. Patient agrees and has no more questions.

## 2019-02-22 NOTE — PROGRESS NOTES
S:  Pt is  at 33w5d for Centering session #6.  Reports her BP at home is always in the 130s over 80s. Reviewed sxs of HTN: headavhe, swelling in hands and face, visual changes.  Reports good FM.  Denies VB, LOF, RUCs or vaginal DC.    O:  Please see above vitals.        FHTs: 139        Fundal ht: 34cm        PIH panel: wnl- reviewed w pt        24 hr urine: protein 207.0, increased from 164.3 on -reviewed w pt          A:  IUP at 33w5d  Patient Active Problem List    Diagnosis Date Noted   • Gestational hypertension, third trimester 2019   • Supervision of normal first pregnancy in third trimester 10/02/2018   • Pilonidal cyst s/p drainage 9/28/18 10/02/2018        P:  1.  Questions answered.          2.  Encouraged adequate water intake        3.  F/u 2wks Centering Session #7        4.  Continue FKCs.      Centering Topics Reviewed:  1.  The Birth Experience  2.   GBS

## 2019-02-25 ENCOUNTER — ROUTINE PRENATAL (OUTPATIENT)
Dept: OBGYN | Facility: CLINIC | Age: 24
End: 2019-02-25

## 2019-02-25 VITALS — DIASTOLIC BLOOD PRESSURE: 86 MMHG | SYSTOLIC BLOOD PRESSURE: 144 MMHG | BODY MASS INDEX: 40.51 KG/M2 | WEIGHT: 236 LBS

## 2019-02-25 DIAGNOSIS — L05.91 PILONIDAL CYST: ICD-10-CM

## 2019-02-25 DIAGNOSIS — Z34.03 SUPERVISION OF NORMAL FIRST PREGNANCY IN THIRD TRIMESTER: ICD-10-CM

## 2019-02-25 DIAGNOSIS — Z34.03 ENCOUNTER FOR SUPERVISION OF NORMAL FIRST PREGNANCY IN THIRD TRIMESTER: Primary | ICD-10-CM

## 2019-02-25 PROCEDURE — 90040 PR PRENATAL FOLLOW UP: CPT | Performed by: NURSE PRACTITIONER

## 2019-02-27 ENCOUNTER — APPOINTMENT (OUTPATIENT)
Dept: RADIOLOGY | Facility: MEDICAL CENTER | Age: 24
End: 2019-02-27
Attending: OBSTETRICS & GYNECOLOGY
Payer: MEDICAID

## 2019-02-27 ENCOUNTER — HOSPITAL ENCOUNTER (INPATIENT)
Facility: MEDICAL CENTER | Age: 24
LOS: 5 days | End: 2019-03-04
Attending: OBSTETRICS & GYNECOLOGY | Admitting: OBSTETRICS & GYNECOLOGY
Payer: MEDICAID

## 2019-02-27 LAB
ALBUMIN SERPL BCP-MCNC: 3.1 G/DL (ref 3.2–4.9)
ALBUMIN SERPL BCP-MCNC: 3.8 G/DL (ref 3.2–4.9)
ALBUMIN/GLOB SERPL: 1 G/DL
ALBUMIN/GLOB SERPL: 1.1 G/DL
ALP SERPL-CCNC: 169 U/L (ref 30–99)
ALP SERPL-CCNC: 232 U/L (ref 30–99)
ALT SERPL-CCNC: 9 U/L (ref 2–50)
ALT SERPL-CCNC: 9 U/L (ref 2–50)
ANION GAP SERPL CALC-SCNC: 10 MMOL/L (ref 0–11.9)
ANION GAP SERPL CALC-SCNC: 8 MMOL/L (ref 0–11.9)
AST SERPL-CCNC: 11 U/L (ref 12–45)
AST SERPL-CCNC: 12 U/L (ref 12–45)
BASOPHILS # BLD AUTO: 0.3 % (ref 0–1.8)
BASOPHILS # BLD: 0.03 K/UL (ref 0–0.12)
BILIRUB SERPL-MCNC: 0.2 MG/DL (ref 0.1–1.5)
BILIRUB SERPL-MCNC: 0.2 MG/DL (ref 0.1–1.5)
BUN SERPL-MCNC: 6 MG/DL (ref 8–22)
BUN SERPL-MCNC: 9 MG/DL (ref 8–22)
CALCIUM SERPL-MCNC: 10.4 MG/DL (ref 8.5–10.5)
CALCIUM SERPL-MCNC: 8.1 MG/DL (ref 8.5–10.5)
CHLORIDE SERPL-SCNC: 105 MMOL/L (ref 96–112)
CHLORIDE SERPL-SCNC: 107 MMOL/L (ref 96–112)
CO2 SERPL-SCNC: 18 MMOL/L (ref 20–33)
CO2 SERPL-SCNC: 22 MMOL/L (ref 20–33)
CREAT SERPL-MCNC: 0.46 MG/DL (ref 0.5–1.4)
CREAT SERPL-MCNC: 0.62 MG/DL (ref 0.5–1.4)
CREAT UR-MCNC: 84.2 MG/DL
EKG IMPRESSION: NORMAL
EOSINOPHIL # BLD AUTO: 0.11 K/UL (ref 0–0.51)
EOSINOPHIL NFR BLD: 1 % (ref 0–6.9)
ERYTHROCYTE [DISTWIDTH] IN BLOOD BY AUTOMATED COUNT: 45.8 FL (ref 35.9–50)
ERYTHROCYTE [DISTWIDTH] IN BLOOD BY AUTOMATED COUNT: 46.7 FL (ref 35.9–50)
GLOBULIN SER CALC-MCNC: 3.1 G/DL (ref 1.9–3.5)
GLOBULIN SER CALC-MCNC: 3.4 G/DL (ref 1.9–3.5)
GLUCOSE SERPL-MCNC: 83 MG/DL (ref 65–99)
GLUCOSE SERPL-MCNC: 98 MG/DL (ref 65–99)
HCT VFR BLD AUTO: 37.5 % (ref 37–47)
HCT VFR BLD AUTO: 40.8 % (ref 37–47)
HGB BLD-MCNC: 12.1 G/DL (ref 12–16)
HGB BLD-MCNC: 12.9 G/DL (ref 12–16)
HOLDING TUBE BB 8507: NORMAL
IMM GRANULOCYTES # BLD AUTO: 0.09 K/UL (ref 0–0.11)
IMM GRANULOCYTES NFR BLD AUTO: 0.8 % (ref 0–0.9)
LYMPHOCYTES # BLD AUTO: 2.44 K/UL (ref 1–4.8)
LYMPHOCYTES NFR BLD: 22.4 % (ref 22–41)
MAGNESIUM SERPL-MCNC: 3.6 MG/DL (ref 1.5–2.5)
MAGNESIUM SERPL-MCNC: 4.5 MG/DL (ref 1.5–2.5)
MAGNESIUM SERPL-MCNC: 5.2 MG/DL (ref 1.5–2.5)
MCH RBC QN AUTO: 26.5 PG (ref 27–33)
MCH RBC QN AUTO: 27.1 PG (ref 27–33)
MCHC RBC AUTO-ENTMCNC: 31.6 G/DL (ref 33.6–35)
MCHC RBC AUTO-ENTMCNC: 32.3 G/DL (ref 33.6–35)
MCV RBC AUTO: 83.9 FL (ref 81.4–97.8)
MCV RBC AUTO: 84 FL (ref 81.4–97.8)
MONOCYTES # BLD AUTO: 0.65 K/UL (ref 0–0.85)
MONOCYTES NFR BLD AUTO: 6 % (ref 0–13.4)
NEUTROPHILS # BLD AUTO: 7.58 K/UL (ref 2–7.15)
NEUTROPHILS NFR BLD: 69.5 % (ref 44–72)
NRBC # BLD AUTO: 0 K/UL
NRBC BLD-RTO: 0 /100 WBC
PLATELET # BLD AUTO: 210 K/UL (ref 164–446)
PLATELET # BLD AUTO: 217 K/UL (ref 164–446)
PMV BLD AUTO: 12.9 FL (ref 9–12.9)
PMV BLD AUTO: 13.2 FL (ref 9–12.9)
POTASSIUM SERPL-SCNC: 4 MMOL/L (ref 3.6–5.5)
POTASSIUM SERPL-SCNC: 4.2 MMOL/L (ref 3.6–5.5)
PROT SERPL-MCNC: 6.2 G/DL (ref 6–8.2)
PROT SERPL-MCNC: 7.2 G/DL (ref 6–8.2)
PROT UR-MCNC: 121.6 MG/DL (ref 0–15)
PROT/CREAT UR: 1444 MG/G (ref 10–107)
RBC # BLD AUTO: 4.47 M/UL (ref 4.2–5.4)
RBC # BLD AUTO: 4.86 M/UL (ref 4.2–5.4)
SODIUM SERPL-SCNC: 133 MMOL/L (ref 135–145)
SODIUM SERPL-SCNC: 137 MMOL/L (ref 135–145)
URATE SERPL-MCNC: 5.8 MG/DL (ref 1.9–8.2)
WBC # BLD AUTO: 10.9 K/UL (ref 4.8–10.8)
WBC # BLD AUTO: 15.1 K/UL (ref 4.8–10.8)

## 2019-02-27 PROCEDURE — 84550 ASSAY OF BLOOD/URIC ACID: CPT

## 2019-02-27 PROCEDURE — 85025 COMPLETE CBC W/AUTO DIFF WBC: CPT

## 2019-02-27 PROCEDURE — 87150 DNA/RNA AMPLIFIED PROBE: CPT

## 2019-02-27 PROCEDURE — 59200 INSERT CERVICAL DILATOR: CPT

## 2019-02-27 PROCEDURE — 76816 OB US FOLLOW-UP PER FETUS: CPT

## 2019-02-27 PROCEDURE — 700111 HCHG RX REV CODE 636 W/ 250 OVERRIDE (IP): Performed by: NURSE PRACTITIONER

## 2019-02-27 PROCEDURE — 82570 ASSAY OF URINE CREATININE: CPT

## 2019-02-27 PROCEDURE — 700101 HCHG RX REV CODE 250

## 2019-02-27 PROCEDURE — 700111 HCHG RX REV CODE 636 W/ 250 OVERRIDE (IP)

## 2019-02-27 PROCEDURE — 87081 CULTURE SCREEN ONLY: CPT

## 2019-02-27 PROCEDURE — 3E0P7VZ INTRODUCTION OF HORMONE INTO FEMALE REPRODUCTIVE, VIA NATURAL OR ARTIFICIAL OPENING: ICD-10-PCS | Performed by: OBSTETRICS & GYNECOLOGY

## 2019-02-27 PROCEDURE — 4A0HXCZ MEASUREMENT OF PRODUCTS OF CONCEPTION, CARDIAC RATE, EXTERNAL APPROACH: ICD-10-PCS | Performed by: OBSTETRICS & GYNECOLOGY

## 2019-02-27 PROCEDURE — 36415 COLL VENOUS BLD VENIPUNCTURE: CPT

## 2019-02-27 PROCEDURE — 85027 COMPLETE CBC AUTOMATED: CPT

## 2019-02-27 PROCEDURE — 80053 COMPREHEN METABOLIC PANEL: CPT

## 2019-02-27 PROCEDURE — C1726 CATH, BAL DIL, NON-VASCULAR: HCPCS

## 2019-02-27 PROCEDURE — 770002 HCHG ROOM/CARE - OB PRIVATE (112)

## 2019-02-27 PROCEDURE — 700102 HCHG RX REV CODE 250 W/ 637 OVERRIDE(OP): Performed by: NURSE PRACTITIONER

## 2019-02-27 PROCEDURE — 700111 HCHG RX REV CODE 636 W/ 250 OVERRIDE (IP): Performed by: OBSTETRICS & GYNECOLOGY

## 2019-02-27 PROCEDURE — 10H07YZ INSERTION OF OTHER DEVICE INTO PRODUCTS OF CONCEPTION, VIA NATURAL OR ARTIFICIAL OPENING: ICD-10-PCS | Performed by: OBSTETRICS & GYNECOLOGY

## 2019-02-27 PROCEDURE — 10907ZC DRAINAGE OF AMNIOTIC FLUID, THERAPEUTIC FROM PRODUCTS OF CONCEPTION, VIA NATURAL OR ARTIFICIAL OPENING: ICD-10-PCS | Performed by: OBSTETRICS & GYNECOLOGY

## 2019-02-27 PROCEDURE — A9270 NON-COVERED ITEM OR SERVICE: HCPCS | Performed by: NURSE PRACTITIONER

## 2019-02-27 PROCEDURE — 93005 ELECTROCARDIOGRAM TRACING: CPT | Performed by: OBSTETRICS & GYNECOLOGY

## 2019-02-27 PROCEDURE — 93010 ELECTROCARDIOGRAM REPORT: CPT | Performed by: INTERNAL MEDICINE

## 2019-02-27 PROCEDURE — 3E033VJ INTRODUCTION OF OTHER HORMONE INTO PERIPHERAL VEIN, PERCUTANEOUS APPROACH: ICD-10-PCS | Performed by: OBSTETRICS & GYNECOLOGY

## 2019-02-27 PROCEDURE — 84156 ASSAY OF PROTEIN URINE: CPT

## 2019-02-27 PROCEDURE — 302135 SEQUENTIAL COMPRESSION MACHINE: Performed by: NURSE PRACTITIONER

## 2019-02-27 PROCEDURE — 83735 ASSAY OF MAGNESIUM: CPT | Mod: 91

## 2019-02-27 PROCEDURE — 700105 HCHG RX REV CODE 258: Performed by: NURSE PRACTITIONER

## 2019-02-27 RX ORDER — METOCLOPRAMIDE HYDROCHLORIDE 5 MG/ML
10 INJECTION INTRAMUSCULAR; INTRAVENOUS EVERY 6 HOURS PRN
Status: CANCELLED | OUTPATIENT
Start: 2019-02-27

## 2019-02-27 RX ORDER — TERBUTALINE SULFATE 1 MG/ML
0.25 INJECTION, SOLUTION SUBCUTANEOUS PRN
Status: DISCONTINUED | OUTPATIENT
Start: 2019-02-27 | End: 2019-02-28

## 2019-02-27 RX ORDER — SODIUM CHLORIDE, SODIUM LACTATE, POTASSIUM CHLORIDE, CALCIUM CHLORIDE 600; 310; 30; 20 MG/100ML; MG/100ML; MG/100ML; MG/100ML
INJECTION, SOLUTION INTRAVENOUS CONTINUOUS
Status: DISCONTINUED | OUTPATIENT
Start: 2019-02-27 | End: 2019-02-28

## 2019-02-27 RX ORDER — ACETAMINOPHEN 325 MG/1
975 TABLET ORAL EVERY 4 HOURS PRN
Status: CANCELLED | OUTPATIENT
Start: 2019-02-27

## 2019-02-27 RX ORDER — PENICILLIN G POTASSIUM 5000000 [IU]/1
INJECTION, POWDER, FOR SOLUTION INTRAMUSCULAR; INTRAVENOUS
Status: ACTIVE
Start: 2019-02-27 | End: 2019-02-28

## 2019-02-27 RX ORDER — MISOPROSTOL 200 UG/1
800 TABLET ORAL
Status: DISCONTINUED | OUTPATIENT
Start: 2019-02-27 | End: 2019-02-28 | Stop reason: HOSPADM

## 2019-02-27 RX ORDER — OXYTOCIN 10 [USP'U]/ML
10 INJECTION, SOLUTION INTRAMUSCULAR; INTRAVENOUS
Status: DISCONTINUED | OUTPATIENT
Start: 2019-02-27 | End: 2019-02-28 | Stop reason: HOSPADM

## 2019-02-27 RX ORDER — MAGNESIUM SULFATE HEPTAHYDRATE 40 MG/ML
4 INJECTION, SOLUTION INTRAVENOUS ONCE
Status: COMPLETED | OUTPATIENT
Start: 2019-02-27 | End: 2019-02-27

## 2019-02-27 RX ORDER — ACETAMINOPHEN 500 MG
1000 TABLET ORAL EVERY 6 HOURS PRN
Status: DISCONTINUED | OUTPATIENT
Start: 2019-02-27 | End: 2019-02-28

## 2019-02-27 RX ORDER — CALCIUM CARBONATE 500 MG/1
500 TABLET, CHEWABLE ORAL DAILY
Status: DISCONTINUED | OUTPATIENT
Start: 2019-02-27 | End: 2019-03-04 | Stop reason: HOSPADM

## 2019-02-27 RX ORDER — CITRIC ACID/SODIUM CITRATE 334-500MG
30 SOLUTION, ORAL ORAL EVERY 6 HOURS PRN
Status: DISCONTINUED | OUTPATIENT
Start: 2019-02-27 | End: 2019-02-28 | Stop reason: HOSPADM

## 2019-02-27 RX ORDER — LABETALOL HYDROCHLORIDE 5 MG/ML
20-40 INJECTION, SOLUTION INTRAVENOUS PRN
Status: DISCONTINUED | OUTPATIENT
Start: 2019-02-27 | End: 2019-02-28

## 2019-02-27 RX ORDER — HYDROCODONE BITARTRATE AND ACETAMINOPHEN 5; 325 MG/1; MG/1
1 TABLET ORAL EVERY 4 HOURS PRN
Status: CANCELLED | OUTPATIENT
Start: 2019-02-27

## 2019-02-27 RX ORDER — ONDANSETRON 2 MG/ML
4 INJECTION INTRAMUSCULAR; INTRAVENOUS EVERY 6 HOURS PRN
Status: DISCONTINUED | OUTPATIENT
Start: 2019-02-27 | End: 2019-03-01

## 2019-02-27 RX ORDER — HYDRALAZINE HYDROCHLORIDE 20 MG/ML
5-10 INJECTION INTRAMUSCULAR; INTRAVENOUS PRN
Status: DISCONTINUED | OUTPATIENT
Start: 2019-02-27 | End: 2019-02-28

## 2019-02-27 RX ORDER — IBUPROFEN 800 MG/1
800 TABLET ORAL EVERY 8 HOURS PRN
Status: CANCELLED | OUTPATIENT
Start: 2019-02-27

## 2019-02-27 RX ORDER — SODIUM CHLORIDE 9 MG/ML
INJECTION, SOLUTION INTRAVENOUS
Status: ACTIVE
Start: 2019-02-27 | End: 2019-02-28

## 2019-02-27 RX ORDER — CARBOPROST TROMETHAMINE 250 UG/ML
250 INJECTION, SOLUTION INTRAMUSCULAR
Status: DISCONTINUED | OUTPATIENT
Start: 2019-02-27 | End: 2019-02-28 | Stop reason: HOSPADM

## 2019-02-27 RX ORDER — BETAMETHASONE SODIUM PHOSPHATE AND BETAMETHASONE ACETATE 3; 3 MG/ML; MG/ML
12 INJECTION, SUSPENSION INTRA-ARTICULAR; INTRALESIONAL; INTRAMUSCULAR; SOFT TISSUE EVERY 24 HOURS
Status: COMPLETED | OUTPATIENT
Start: 2019-02-27 | End: 2019-02-28

## 2019-02-27 RX ORDER — MAGNESIUM SULFATE HEPTAHYDRATE 40 MG/ML
2 INJECTION, SOLUTION INTRAVENOUS CONTINUOUS
Status: DISCONTINUED | OUTPATIENT
Start: 2019-02-27 | End: 2019-02-28

## 2019-02-27 RX ORDER — ROPIVACAINE HYDROCHLORIDE 2 MG/ML
INJECTION, SOLUTION EPIDURAL; INFILTRATION; PERINEURAL
Status: COMPLETED
Start: 2019-02-27 | End: 2019-02-27

## 2019-02-27 RX ADMIN — SODIUM CHLORIDE, POTASSIUM CHLORIDE, SODIUM LACTATE AND CALCIUM CHLORIDE: 600; 310; 30; 20 INJECTION, SOLUTION INTRAVENOUS at 16:16

## 2019-02-27 RX ADMIN — ROPIVACAINE HYDROCHLORIDE 100 ML: 2 INJECTION, SOLUTION EPIDURAL; INFILTRATION at 16:19

## 2019-02-27 RX ADMIN — SODIUM CHLORIDE, POTASSIUM CHLORIDE, SODIUM LACTATE AND CALCIUM CHLORIDE: 600; 310; 30; 20 INJECTION, SOLUTION INTRAVENOUS at 00:56

## 2019-02-27 RX ADMIN — BETAMETHASONE SODIUM PHOSPHATE AND BETAMETHASONE ACETATE 12 MG: 3; 3 INJECTION, SUSPENSION INTRA-ARTICULAR; INTRALESIONAL; INTRAMUSCULAR at 03:56

## 2019-02-27 RX ADMIN — MISOPROSTOL 25 MCG: 100 TABLET ORAL at 09:26

## 2019-02-27 RX ADMIN — Medication 2 MILLI-UNITS/MIN: at 14:27

## 2019-02-27 RX ADMIN — HYDRALAZINE HYDROCHLORIDE 5 MG: 20 INJECTION INTRAMUSCULAR; INTRAVENOUS at 00:57

## 2019-02-27 RX ADMIN — FENTANYL CITRATE 100 MCG: 50 INJECTION INTRAMUSCULAR; INTRAVENOUS at 15:00

## 2019-02-27 RX ADMIN — FENTANYL CITRATE 100 MCG: 50 INJECTION INTRAMUSCULAR; INTRAVENOUS at 13:38

## 2019-02-27 RX ADMIN — MISOPROSTOL 25 MCG: 100 TABLET ORAL at 04:16

## 2019-02-27 RX ADMIN — MAGNESIUM SULFATE IN WATER 4 G: 40 INJECTION, SOLUTION INTRAVENOUS at 00:55

## 2019-02-27 RX ADMIN — SODIUM CHLORIDE 5 MILLION UNITS: 900 INJECTION INTRAVENOUS at 20:50

## 2019-02-27 RX ADMIN — MAGNESIUM SULFATE HEPTAHYDRATE 2 G/HR: 40 INJECTION, SOLUTION INTRAVENOUS at 01:30

## 2019-02-27 RX ADMIN — MAGNESIUM SULFATE HEPTAHYDRATE 2 G/HR: 40 INJECTION, SOLUTION INTRAVENOUS at 20:55

## 2019-02-27 RX ADMIN — ACETAMINOPHEN 1000 MG: 500 TABLET ORAL at 04:36

## 2019-02-27 RX ADMIN — SODIUM CHLORIDE, POTASSIUM CHLORIDE, SODIUM LACTATE AND CALCIUM CHLORIDE: 600; 310; 30; 20 INJECTION, SOLUTION INTRAVENOUS at 14:27

## 2019-02-27 RX ADMIN — ONDANSETRON 4 MG: 2 INJECTION INTRAMUSCULAR; INTRAVENOUS at 00:57

## 2019-02-27 ASSESSMENT — LIFESTYLE VARIABLES: EVER_SMOKED: NEVER

## 2019-02-27 ASSESSMENT — PATIENT HEALTH QUESTIONNAIRE - PHQ9
1. LITTLE INTEREST OR PLEASURE IN DOING THINGS: NOT AT ALL
SUM OF ALL RESPONSES TO PHQ9 QUESTIONS 1 AND 2: 0
1. LITTLE INTEREST OR PLEASURE IN DOING THINGS: NOT AT ALL
2. FEELING DOWN, DEPRESSED, IRRITABLE, OR HOPELESS: NOT AT ALL
SUM OF ALL RESPONSES TO PHQ9 QUESTIONS 1 AND 2: 0
1. LITTLE INTEREST OR PLEASURE IN DOING THINGS: NOT AT ALL
2. FEELING DOWN, DEPRESSED, IRRITABLE, OR HOPELESS: NOT AT ALL
SUM OF ALL RESPONSES TO PHQ9 QUESTIONS 1 AND 2: 0
2. FEELING DOWN, DEPRESSED, IRRITABLE, OR HOPELESS: NOT AT ALL

## 2019-02-27 NOTE — PROGRESS NOTES
Physician Labor Progress Note:    S: Pt reports she is doing well - denies HA, vision changes, RUQ pain, or other issue.  Feeling some cramping, no LOF, no VB, +FM.    Vitals:    19 1345 19 1346 19 1349 19 1351   BP:       Pulse: (!) 111 (!) 109 (!) 106 (!) 104   Resp:       Temp:       TempSrc:       SpO2:  97%  96%   Weight:       Height:           Gen: NAD  Pulm: nonlabored breathing on RA  SVE: /-3, Cook placed  Ext: no edema, SCDs on    FHT: baseline 130, moderate variability accels present no decels  toco: no ctx    A/P: 23 y.o.  @ 34w4d admitted for IOL  #IOL for PreE w/ SF.  On Mag.  Will continue to monitor Bps and s/s of PreE/Mag toxicity.  Cook placed.  Will do pitocin and Cook.  Plan to check cook every 4 hours.    #FHT: cat 1  #Pain management: nothing - considering options  #GBS: unknown - plan to treat with KEVEN Sepulveda DO  [facility], [facility]

## 2019-02-27 NOTE — CARE PLAN
Problem: Infection  Goal: Will remain free from infection    Intervention: Assess signs and symptoms of infection  Pt remains free from s/s of infection        Problem: Venous Thromboembolism (VTW)/Deep Vein Thrombosis (DVT) Prevention:  Goal: Patient will participate in Venous Thrombosis (VTE)/Deep Vein Thrombosis (DVT)Prevention Measures    Intervention: Ensure patient wears graduated elastic stockings (MODESTO hose) and/or SCDs, if ordered, when in bed or chair (Remove at least once per shift for skin check)  SCD's placed bilateral      Problem: Pain Management  Goal: Pain level will decrease to patient's comfort goal    Intervention: Follow pain managment plan developed in collaboration with patient and Interdisciplinary Team  Pt medications given as ordered

## 2019-02-27 NOTE — PROGRESS NOTES
0028 22y/o  edc 2019, EGA 34 , Here to l&d room S217 with FOB. C/O headache and high blood pressure at home. EFM/TOCO applied, patients states positive fetal movement. Denies vaginal bleeding or leaking of fluid. ILEANA Tee CNM updated and orders received.  0055 mag started per protocol, ibrahim placed, SVE closed/thick/-3  0302 US at patients bedside  0400 report to CATHELEN Brown RN

## 2019-02-27 NOTE — PROGRESS NOTES
"Pt states she feels \"tired\" but denies strong UCs or pain. D/w pt plan to recheck and continue with IOL for preeclampsia.     Cervix: Cl/75/-4, vtx, soft, mid  NST: Cat 1  TOCO: No UCs seen  BPs: 140-150s/80-90s    A/P: IUP at 34w4d - another cytotec placed, consider Cooks catheter when dilated to 1cm. Continue Mag, watch BPs closely, pain control, anticipate .   "

## 2019-02-27 NOTE — H&P
History and Physical      Komal Hall is a 23 y.o. year old female  at 34w4d who presents for blurred vision started this evening. She also reports swelling in her feet. She has been taking her BPs at home and states they are always 130s over 80s. Denies headache or epigastric pain.    Subjective:   negative  For CTXS.   negative Feels pain   negative for LOF  negative for vaginal bleeding.   positive for fetal movement    ROS: Pertinent items are noted in HPI.    Past Medical History:   Diagnosis Date   • Gestational hypertension, third trimester 2019     No past surgical history on file.  OB History    Para Term  AB Living   1             SAB TAB Ectopic Molar Multiple Live Births                    # Outcome Date GA Lbr Justin/2nd Weight Sex Delivery Anes PTL Lv   1 Current                 Social History     Social History   • Marital status: Single     Spouse name: N/A   • Number of children: N/A   • Years of education: N/A     Occupational History   • Not on file.     Social History Main Topics   • Smoking status: Never Smoker   • Smokeless tobacco: Never Used   • Alcohol use No   • Drug use: No   • Sexual activity: Yes     Partners: Male     Birth control/ protection: Pill      Comment: was taking pill for bc     Other Topics Concern   • Not on file     Social History Narrative   • No narrative on file     Allergies: Patient has no known allergies.    Current Facility-Administered Medications:   •  lactated ringers infusion, , Intravenous, Continuous, Naty Tee, A.P.R.N., Last Rate: 75 mL/hr at 19  •  ondansetron (ZOFRAN) syringe/vial injection 4 mg, 4 mg, Intravenous, Q6HRS PRN, Naty Tee, A.P.R.N., 4 mg at 197  •  magnesium sulfate IVPB premix 4 g, 4 g, Intravenous, Once, Naty Tee, A.P.R.N., Last Rate: 300 mL/hr at 19, 4 g at 19  •  magnesium sulfate 40 g/1000mL infusion, 2 g/hr, Intravenous, Continuous, Naty  "FAISAL TeeRVEL  •  betamethasone acetate-betamethasone sodium phosphate (CELESTONE) injection 12 mg, 12 mg, Intramuscular, Q24HR, FAISAL PrestonRVEL  •  hydrALAZINE (APRESOLINE) injection 5-10 mg, 5-10 mg, Intravenous, PRN, 5 mg at 02/27/19 0057 **OR** labetalol (NORMODYNE,TRANDATE) injection 20-40 mg, 20-40 mg, Intravenous, PRN, DAVID PrestonPTrumanRVEL    Prenatal care with TPC starting at 13w3d with following problems:  Patient Active Problem List    Diagnosis Date Noted   • Gestational hypertension, third trimester 01/28/2019   • Supervision of normal first pregnancy in third trimester 10/02/2018   • Pilonidal cyst s/p drainage 9/28/18 10/02/2018         Objective:      Blood pressure (!) 170/104, pulse 89, temperature 36.9 °C (98.5 °F), temperature source Temporal, height 1.6 m (5' 3\"), weight 107 kg (236 lb), last menstrual period 06/30/2018.    General:   no acute distress, alert sand cooperative   Skin:   normal   HEENT:  PERRLA   Lungs:   CTA bilateral    Heart:   S1, S2 normal, no murmur, click, rub or gallop, regular rate and rhythm, brisk carotid upstroke without bruits, peripheral pulses very brisk, chest is clear without rales or wheezing, 3+ pedal edema present, no JVD, no hepatosplenomegaly   Abdomen:   gravid, NT   EFW:  3000 g   Pelvis:  Exam deferred., proven to 0 g   FHTs: 150 BPM + accels - decels modewrate variability   Contractions: 0 contractions in 0 min mild to palpation   Uterine Size: S=D   Presentations: Cephalic per RN   Cervix: Per RN    Dilation: Closed    Effacement: Long    Station:  Floating    Consistency: Medium    Position: Posterior     Complete OB US  Normal fetal survey on 11/20/18  Lab Review  Lab:   Blood type: O     Recent Results (from the past 5880 hour(s))   POCT Pregnancy    Collection Time: 08/20/18  9:35 AM   Result Value Ref Range    POC Urine Pregnancy Test Positive Negative    Internal Control Positive Positive     Internal Control Negative Negative  "   POCT Urinalysis    Collection Time: 10/02/18  8:15 AM   Result Value Ref Range    POC Color  Negative    POC Appearance  Negative    POC Leukocyte Esterase Negative Negative    POC Nitrites Negative Negative    POC Urobiligen  Negative (0.2) mg/dL    POC Protein Negative Negative mg/dL    POC Urine PH 6.0 5.0 - 8.0    POC Blood Negative Negative    POC Specific Gravity 1.025 <1.005 - >1.030    POC Ketones Negative Negative mg/dL    POC Bilirubin  Negative mg/dL    POC Glucose Negative Negative mg/dL   CHLAMYDIA/GC PCR URINE OR SWAB    Collection Time: 10/02/18  8:53 AM   Result Value Ref Range    Source Genital     C. trachomatis by PCR Negative Negative    N. gonorrhoeae by PCR Negative Negative   PREG CNTR PRENATAL PN    Collection Time: 10/25/18 11:21 AM   Result Value Ref Range    Color Yellow     Character Cloudy (A)     Specific Gravity 1.020 <1.035    Ph 7.0 5.0 - 8.0    Glucose Negative Negative mg/dL    Ketones Negative Negative mg/dL    Protein Negative Negative mg/dL    Bilirubin Negative Negative    Urobilinogen, Urine 0.2 Negative    Nitrite Negative Negative    Leukocyte Esterase Negative Negative    Occult Blood Negative Negative    Micro Urine Req Microscopic     WBC 11.1 (H) 4.8 - 10.8 K/uL    RBC 4.77 4.20 - 5.40 M/uL    Hemoglobin 13.4 12.0 - 16.0 g/dL    Hematocrit 40.2 37.0 - 47.0 %    MCV 84.3 81.4 - 97.8 fL    MCH 28.1 27.0 - 33.0 pg    MCHC 33.3 (L) 33.6 - 35.0 g/dL    RDW 42.8 35.9 - 50.0 fL    Platelet Count 336 164 - 446 K/uL    MPV 11.6 9.0 - 12.9 fL    Neutrophils-Polys 80.80 (H) 44.00 - 72.00 %    Lymphocytes 14.10 (L) 22.00 - 41.00 %    Monocytes 3.70 0.00 - 13.40 %    Eosinophils 0.60 0.00 - 6.90 %    Basophils 0.30 0.00 - 1.80 %    Immature Granulocytes 0.50 0.00 - 0.90 %    Nucleated RBC 0.00 /100 WBC    Neutrophils (Absolute) 8.98 (H) 2.00 - 7.15 K/uL    Lymphs (Absolute) 1.57 1.00 - 4.80 K/uL    Monos (Absolute) 0.41 0.00 - 0.85 K/uL    Eos (Absolute) 0.07 0.00 - 0.51 K/uL     Baso (Absolute) 0.03 0.00 - 0.12 K/uL    Immature Granulocytes (abs) 0.05 0.00 - 0.11 K/uL    NRBC (Absolute) 0.00 K/uL    Rubella IgG Antibody 11.50 IU/mL    Hepatitis B Surface Antigen Negative Negative    Syphilis, Treponemal Qual Non Reactive Non Reactive   AFP TETRA    Collection Time: 10/25/18 11:21 AM   Result Value Ref Range    AFP Value -Eia 33 ng/mL    AFP MOM Value 1.16     Ue3 Value 0.51 ng/mL    Ue3 Mom 0.54     Patient's hCG, 2nd Trimester 09681 IU/L    hCG MoM, 2nd Trimester 2.49     Lorena Value -Eia 257 pg/mL    Lorena Mom Value 1.82     Interpretation Screen Neg     [age]UE [age] yr    Maternal Weight 210.0 lbs.     Gest. Age on Collection Date 16 wks, 5 days     Gestational Age Based On Other     Multiple Pregnancy Gandhi     Race Nonblack     Insulin Dependent Diabetes No     Smoking No     Family Hx NTD No     Family Hx of Aneuploidy No     Specimen See Note     EER Quad, Maternal Serum See Note    HIV AG/AB COMBO ASSAY SCREENING    Collection Time: 10/25/18 11:21 AM   Result Value Ref Range    HIV Ag/Ab Combo Assay Non Reactive Non Reactive   URINE MICROSCOPIC (W/UA)    Collection Time: 10/25/18 11:21 AM   Result Value Ref Range    WBC 20-50 (A) /hpf    RBC 5-10 (A) /hpf    Bacteria Many (A) None /hpf    Epithelial Cells Few /hpf    Hyaline Cast 3-5 (A) /lpf   URINE CULTURE(NEW)    Collection Time: 10/25/18 11:21 AM   Result Value Ref Range    Significant Indicator NEG     Source UR     Site      Urine Culture Mixed skin zulema ,000 cfu/mL    OP PRENATAL PANEL-BLOOD BANK    Collection Time: 10/25/18 11:29 AM   Result Value Ref Range    ABO Grouping Only O     Rh Grouping Only POS     Antibody Screen Scrn NEG    URINETOTAL PROTEIN 24 HR    Collection Time: 11/19/18  2:00 PM   Result Value Ref Range    Total Protein, Urine 9.0 0.0 - 15.0 mg/dL    Total Volume, Urine 1825 mL    Total Protein, 24 Hour Urine 164.3 (H) 30.0 - 150.0 mg/24 Hr   PREGNANCY INDUCED HYPERTENSION PROFILE-CBC  W/O, BUN, CREATININE, AST, URIC ACID    Collection Time: 11/20/18  2:27 PM   Result Value Ref Range    WBC 11.0 (H) 4.8 - 10.8 K/uL    RBC 4.33 4.20 - 5.40 M/uL    Hemoglobin 11.8 (L) 12.0 - 16.0 g/dL    Hematocrit 37.1 37.0 - 47.0 %    MCV 85.7 81.4 - 97.8 fL    MCH 27.3 27.0 - 33.0 pg    MCHC 31.8 (L) 33.6 - 35.0 g/dL    RDW 44.3 35.9 - 50.0 fL    Platelet Count 344 164 - 446 K/uL    MPV 11.7 9.0 - 12.9 fL    Bun 7 (L) 8 - 22 mg/dL    Creatinine 0.40 (L) 0.50 - 1.40 mg/dL    AST(SGOT) 13 12 - 45 U/L    Uric Acid 3.8 1.9 - 8.2 mg/dL   ESTIMATED GFR    Collection Time: 11/20/18  2:27 PM   Result Value Ref Range    GFR If African American >60 >60 mL/min/1.73 m 2    GFR If Non African American >60 >60 mL/min/1.73 m 2   GLUCOSE 1HR GESTATIONAL    Collection Time: 01/02/19 10:38 AM   Result Value Ref Range    Glucose, Post Dose 108 70 - 139 mg/dL   HCT    Collection Time: 01/02/19 10:38 AM   Result Value Ref Range    Hematocrit 39.3 37.0 - 47.0 %   HGB    Collection Time: 01/02/19 10:38 AM   Result Value Ref Range    Hemoglobin 12.5 12.0 - 16.0 g/dL   T.PALLIDUM AB EIA    Collection Time: 01/02/19 10:38 AM   Result Value Ref Range    Syphilis, Treponemal Qual Non Reactive Non Reactive   URINETOTAL PROTEIN 24 HR    Collection Time: 02/13/19 10:00 AM   Result Value Ref Range    Total Volume, Urine 1800 mL    Total Protein, Urine 11.5 0.0 - 15.0 mg/dL    Total Protein, 24 Hour Urine 207.0 (H) 30.0 - 150.0 mg/24 Hr   PREGNANCY INDUCED HYPERTENSION PROFILE-CBC W/O, BUN, CREATININE, AST, URIC ACID    Collection Time: 02/13/19 10:09 AM   Result Value Ref Range    WBC 10.3 4.8 - 10.8 K/uL    RBC 4.76 4.20 - 5.40 M/uL    Hemoglobin 13.0 12.0 - 16.0 g/dL    Hematocrit 40.4 37.0 - 47.0 %    MCV 84.9 81.4 - 97.8 fL    MCH 27.3 27.0 - 33.0 pg    MCHC 32.2 (L) 33.6 - 35.0 g/dL    RDW 45.9 35.9 - 50.0 fL    Platelet Count 216 164 - 446 K/uL    MPV 12.8 9.0 - 12.9 fL    Bun 5 (L) 8 - 22 mg/dL    Creatinine 0.46 (L) 0.50 - 1.40 mg/dL     AST(SGOT) 14 12 - 45 U/L    Uric Acid 4.8 1.9 - 8.2 mg/dL   ESTIMATED GFR    Collection Time: 19 10:09 AM   Result Value Ref Range    GFR If African American >60 >60 mL/min/1.73 m 2    GFR If Non African American >60 >60 mL/min/1.73 m 2        Assessment:   1.  IUP at 34w4d  2.  Labor status: Not in labor.  3.  Cat 1 FHTs  4.  Obstetrical history significant for   Patient Active Problem List    Diagnosis Date Noted   • Gestational hypertension, third trimester 2019   • Supervision of normal first pregnancy in third trimester 10/02/2018   • Pilonidal cyst s/p drainage 9/28/18 10/02/2018   .      Plan:   Discussed plan of care with patient and  (Luis). Questions answered regarding  delivery and NICU stay for infant.    Consult with Dr. Jones, agrees with management of care    Admit to L&D  PIH labs  Creatinine ratio  24 hr urine  Magnesium sulfate  Betamethasone  GBS   Hypertensive protocol      Naty Tee, ROBEL, APRN      OB Attending Addendum:  I have seen Ms. Komal Hall and agree w/ documentation by ROBEL Tee.  Pt is a 23 y.o.yo  @ 34w4d with gHTN since 20wks no with severely elevated BPs, blurry vision this evening.  S/p IV hydralazine 5mg x1 with improvement in Bps to mild range.  Started on mag sulfate for seizure ppx.  Labs pending.  Discussed with pt/ would move forward with delivery given >34wks with severe Bps and pt symptoms after initial stabilization. Given first dose of late  steroids (would not delay delivery for steroid administration).  Cervix closed per RN.  Will get growth US now (last US normal anatomy at 20wks).  FHTs reactive and reassuring.  Also if available will have NICU speak with pt/ about anticipated course for 34wk delivery -  with initial question of what is the survival rate at this gestational age.  Discussed survival is very good, typically need some NICU care but recommend discussion with lexis for more  accurate information.      Some LE edema, 3+ DTRs       Zulema Jones MD  [facility], Women[facility]

## 2019-02-27 NOTE — PROGRESS NOTES
0700. Report from  Patricia PHELPS. POC discussed and resumed at the bedside. Pt resting comfortably and denies HA, spots in her vision or epigastric pain. She has no needs at this time.    0925. SERGIO Dwyer in room, SVE 1/75/-2. Cytotec placed.    1340. Dr. Sepulveda at bedside, SVE 1 cm. Cook balloon placed, 60 uterine/ 40 vaginally.

## 2019-02-28 LAB
ERYTHROCYTE [DISTWIDTH] IN BLOOD BY AUTOMATED COUNT: 46.4 FL (ref 35.9–50)
GP B STREP DNA SPEC QL NAA+PROBE: NEGATIVE
HCT VFR BLD AUTO: 36 % (ref 37–47)
HGB BLD-MCNC: 11.7 G/DL (ref 12–16)
MAGNESIUM SERPL-MCNC: 5.2 MG/DL (ref 1.5–2.5)
MAGNESIUM SERPL-MCNC: 5.2 MG/DL (ref 1.5–2.5)
MAGNESIUM SERPL-MCNC: 5.4 MG/DL (ref 1.5–2.5)
MAGNESIUM SERPL-MCNC: 5.6 MG/DL (ref 1.5–2.5)
MCH RBC QN AUTO: 27.3 PG (ref 27–33)
MCHC RBC AUTO-ENTMCNC: 32.5 G/DL (ref 33.6–35)
MCV RBC AUTO: 83.9 FL (ref 81.4–97.8)
PATHOLOGY CONSULT NOTE: NORMAL
PLATELET # BLD AUTO: 234 K/UL (ref 164–446)
PMV BLD AUTO: 13.1 FL (ref 9–12.9)
RBC # BLD AUTO: 4.29 M/UL (ref 4.2–5.4)
WBC # BLD AUTO: 17.4 K/UL (ref 4.8–10.8)

## 2019-02-28 PROCEDURE — A9270 NON-COVERED ITEM OR SERVICE: HCPCS | Performed by: OBSTETRICS & GYNECOLOGY

## 2019-02-28 PROCEDURE — 700102 HCHG RX REV CODE 250 W/ 637 OVERRIDE(OP)

## 2019-02-28 PROCEDURE — 36415 COLL VENOUS BLD VENIPUNCTURE: CPT

## 2019-02-28 PROCEDURE — 700105 HCHG RX REV CODE 258: Performed by: OBSTETRICS & GYNECOLOGY

## 2019-02-28 PROCEDURE — 304964 HCHG RECOVERY ROOM TIME 1HR: Performed by: OBSTETRICS & GYNECOLOGY

## 2019-02-28 PROCEDURE — 700105 HCHG RX REV CODE 258

## 2019-02-28 PROCEDURE — 700105 HCHG RX REV CODE 258: Performed by: NURSE PRACTITIONER

## 2019-02-28 PROCEDURE — 59514 CESAREAN DELIVERY ONLY: CPT | Performed by: OBSTETRICS & GYNECOLOGY

## 2019-02-28 PROCEDURE — 700111 HCHG RX REV CODE 636 W/ 250 OVERRIDE (IP): Performed by: OBSTETRICS & GYNECOLOGY

## 2019-02-28 PROCEDURE — 700102 HCHG RX REV CODE 250 W/ 637 OVERRIDE(OP): Performed by: OBSTETRICS & GYNECOLOGY

## 2019-02-28 PROCEDURE — 59514 CESAREAN DELIVERY ONLY: CPT | Mod: 80

## 2019-02-28 PROCEDURE — 306828 HCHG ANES-TIME GENERAL: Performed by: OBSTETRICS & GYNECOLOGY

## 2019-02-28 PROCEDURE — 770002 HCHG ROOM/CARE - OB PRIVATE (112)

## 2019-02-28 PROCEDURE — 85027 COMPLETE CBC AUTOMATED: CPT

## 2019-02-28 PROCEDURE — A9270 NON-COVERED ITEM OR SERVICE: HCPCS | Performed by: ANESTHESIOLOGY

## 2019-02-28 PROCEDURE — 700111 HCHG RX REV CODE 636 W/ 250 OVERRIDE (IP): Performed by: NURSE PRACTITIONER

## 2019-02-28 PROCEDURE — 700101 HCHG RX REV CODE 250

## 2019-02-28 PROCEDURE — 700111 HCHG RX REV CODE 636 W/ 250 OVERRIDE (IP)

## 2019-02-28 PROCEDURE — 304966 HCHG RECOVERY SVSC TIME ADDL 1/2 HR: Performed by: OBSTETRICS & GYNECOLOGY

## 2019-02-28 PROCEDURE — 59514 CESAREAN DELIVERY ONLY: CPT

## 2019-02-28 PROCEDURE — 306288 HCHG RETRACTOR C SECTION LG

## 2019-02-28 PROCEDURE — 83735 ASSAY OF MAGNESIUM: CPT | Mod: 91

## 2019-02-28 PROCEDURE — 88307 TISSUE EXAM BY PATHOLOGIST: CPT

## 2019-02-28 PROCEDURE — 305385 HCHG SURGICAL SERVICES 1/4 HOUR: Performed by: OBSTETRICS & GYNECOLOGY

## 2019-02-28 PROCEDURE — 700111 HCHG RX REV CODE 636 W/ 250 OVERRIDE (IP): Performed by: ANESTHESIOLOGY

## 2019-02-28 PROCEDURE — 700102 HCHG RX REV CODE 250 W/ 637 OVERRIDE(OP): Performed by: ANESTHESIOLOGY

## 2019-02-28 PROCEDURE — 303615 HCHG EPIDURAL/SPINAL ANESTHESIA FOR LABOR

## 2019-02-28 RX ORDER — SIMETHICONE 80 MG
80 TABLET,CHEWABLE ORAL 4 TIMES DAILY PRN
Status: DISCONTINUED | OUTPATIENT
Start: 2019-02-28 | End: 2019-03-04 | Stop reason: HOSPADM

## 2019-02-28 RX ORDER — DIPHENHYDRAMINE HYDROCHLORIDE 50 MG/ML
12.5 INJECTION INTRAMUSCULAR; INTRAVENOUS
Status: DISCONTINUED | OUTPATIENT
Start: 2019-02-28 | End: 2019-02-28 | Stop reason: HOSPADM

## 2019-02-28 RX ORDER — HYDROMORPHONE HYDROCHLORIDE 1 MG/ML
0.2 INJECTION, SOLUTION INTRAMUSCULAR; INTRAVENOUS; SUBCUTANEOUS
Status: DISCONTINUED | OUTPATIENT
Start: 2019-02-28 | End: 2019-02-28 | Stop reason: HOSPADM

## 2019-02-28 RX ORDER — BISACODYL 10 MG
10 SUPPOSITORY, RECTAL RECTAL PRN
Status: DISCONTINUED | OUTPATIENT
Start: 2019-02-28 | End: 2019-03-04 | Stop reason: HOSPADM

## 2019-02-28 RX ORDER — SODIUM CHLORIDE, SODIUM GLUCONATE, SODIUM ACETATE, POTASSIUM CHLORIDE AND MAGNESIUM CHLORIDE 526; 502; 368; 37; 30 MG/100ML; MG/100ML; MG/100ML; MG/100ML; MG/100ML
INJECTION, SOLUTION INTRAVENOUS
Status: COMPLETED
Start: 2019-02-28 | End: 2019-02-28

## 2019-02-28 RX ORDER — HYDROMORPHONE HYDROCHLORIDE 1 MG/ML
0.1 INJECTION, SOLUTION INTRAMUSCULAR; INTRAVENOUS; SUBCUTANEOUS
Status: DISCONTINUED | OUTPATIENT
Start: 2019-02-28 | End: 2019-02-28 | Stop reason: HOSPADM

## 2019-02-28 RX ORDER — MAGNESIUM SULFATE HEPTAHYDRATE 40 MG/ML
2 INJECTION, SOLUTION INTRAVENOUS CONTINUOUS
Status: DISCONTINUED | OUTPATIENT
Start: 2019-02-28 | End: 2019-03-01

## 2019-02-28 RX ORDER — METOCLOPRAMIDE HYDROCHLORIDE 5 MG/ML
10 INJECTION INTRAMUSCULAR; INTRAVENOUS ONCE
Status: COMPLETED | OUTPATIENT
Start: 2019-02-28 | End: 2019-02-28

## 2019-02-28 RX ORDER — DOCUSATE SODIUM 100 MG/1
100 CAPSULE, LIQUID FILLED ORAL 2 TIMES DAILY PRN
Status: DISCONTINUED | OUTPATIENT
Start: 2019-02-28 | End: 2019-03-04 | Stop reason: HOSPADM

## 2019-02-28 RX ORDER — HYDROMORPHONE HYDROCHLORIDE 1 MG/ML
0.2 INJECTION, SOLUTION INTRAMUSCULAR; INTRAVENOUS; SUBCUTANEOUS
Status: ACTIVE | OUTPATIENT
Start: 2019-02-28 | End: 2019-03-01

## 2019-02-28 RX ORDER — METOCLOPRAMIDE HYDROCHLORIDE 5 MG/ML
INJECTION INTRAMUSCULAR; INTRAVENOUS
Status: COMPLETED
Start: 2019-02-28 | End: 2019-02-28

## 2019-02-28 RX ORDER — HYDRALAZINE HYDROCHLORIDE 20 MG/ML
5 INJECTION INTRAMUSCULAR; INTRAVENOUS
Status: DISCONTINUED | OUTPATIENT
Start: 2019-02-28 | End: 2019-02-28 | Stop reason: HOSPADM

## 2019-02-28 RX ORDER — LORAZEPAM 2 MG/ML
0.5 INJECTION INTRAMUSCULAR
Status: DISCONTINUED | OUTPATIENT
Start: 2019-02-28 | End: 2019-02-28 | Stop reason: HOSPADM

## 2019-02-28 RX ORDER — DIPHENHYDRAMINE HYDROCHLORIDE 50 MG/ML
25 INJECTION INTRAMUSCULAR; INTRAVENOUS EVERY 6 HOURS PRN
Status: ACTIVE | OUTPATIENT
Start: 2019-02-28 | End: 2019-03-01

## 2019-02-28 RX ORDER — CITRIC ACID/SODIUM CITRATE 334-500MG
SOLUTION, ORAL ORAL
Status: COMPLETED
Start: 2019-02-28 | End: 2019-02-28

## 2019-02-28 RX ORDER — DIPHENHYDRAMINE HYDROCHLORIDE 50 MG/ML
12.5 INJECTION INTRAMUSCULAR; INTRAVENOUS EVERY 6 HOURS PRN
Status: ACTIVE | OUTPATIENT
Start: 2019-02-28 | End: 2019-03-01

## 2019-02-28 RX ORDER — METOPROLOL TARTRATE 1 MG/ML
1 INJECTION, SOLUTION INTRAVENOUS
Status: DISCONTINUED | OUTPATIENT
Start: 2019-02-28 | End: 2019-02-28 | Stop reason: HOSPADM

## 2019-02-28 RX ORDER — ROPIVACAINE HYDROCHLORIDE 2 MG/ML
INJECTION, SOLUTION EPIDURAL; INFILTRATION; PERINEURAL CONTINUOUS
Status: DISCONTINUED | OUTPATIENT
Start: 2019-02-28 | End: 2019-02-28

## 2019-02-28 RX ORDER — HYDROMORPHONE HYDROCHLORIDE 1 MG/ML
0.4 INJECTION, SOLUTION INTRAMUSCULAR; INTRAVENOUS; SUBCUTANEOUS
Status: ACTIVE | OUTPATIENT
Start: 2019-02-28 | End: 2019-03-01

## 2019-02-28 RX ORDER — ONDANSETRON 2 MG/ML
4 INJECTION INTRAMUSCULAR; INTRAVENOUS EVERY 6 HOURS PRN
Status: DISPENSED | OUTPATIENT
Start: 2019-02-28 | End: 2019-03-01

## 2019-02-28 RX ORDER — VITAMIN A ACETATE, BETA CAROTENE, ASCORBIC ACID, CHOLECALCIFEROL, .ALPHA.-TOCOPHEROL ACETATE, DL-, THIAMINE MONONITRATE, RIBOFLAVIN, NIACINAMIDE, PYRIDOXINE HYDROCHLORIDE, FOLIC ACID, CYANOCOBALAMIN, CALCIUM CARBONATE, FERROUS FUMARATE, ZINC OXIDE, CUPRIC OXIDE 3080; 12; 120; 400; 1; 1.84; 3; 20; 22; 920; 25; 200; 27; 10; 2 [IU]/1; UG/1; MG/1; [IU]/1; MG/1; MG/1; MG/1; MG/1; MG/1; [IU]/1; MG/1; MG/1; MG/1; MG/1; MG/1
1 TABLET, FILM COATED ORAL EVERY MORNING
Status: DISCONTINUED | OUTPATIENT
Start: 2019-02-28 | End: 2019-03-04 | Stop reason: HOSPADM

## 2019-02-28 RX ORDER — ONDANSETRON 2 MG/ML
4 INJECTION INTRAMUSCULAR; INTRAVENOUS
Status: DISCONTINUED | OUTPATIENT
Start: 2019-02-28 | End: 2019-02-28 | Stop reason: HOSPADM

## 2019-02-28 RX ORDER — OXYCODONE HYDROCHLORIDE 10 MG/1
10 TABLET ORAL EVERY 4 HOURS PRN
Status: ACTIVE | OUTPATIENT
Start: 2019-02-28 | End: 2019-03-01

## 2019-02-28 RX ORDER — SODIUM CHLORIDE, SODIUM LACTATE, POTASSIUM CHLORIDE, AND CALCIUM CHLORIDE .6; .31; .03; .02 G/100ML; G/100ML; G/100ML; G/100ML
1000 INJECTION, SOLUTION INTRAVENOUS
Status: DISCONTINUED | OUTPATIENT
Start: 2019-02-28 | End: 2019-02-28

## 2019-02-28 RX ORDER — SODIUM CHLORIDE, SODIUM LACTATE, POTASSIUM CHLORIDE, CALCIUM CHLORIDE 600; 310; 30; 20 MG/100ML; MG/100ML; MG/100ML; MG/100ML
INJECTION, SOLUTION INTRAVENOUS PRN
Status: DISCONTINUED | OUTPATIENT
Start: 2019-02-28 | End: 2019-03-04 | Stop reason: HOSPADM

## 2019-02-28 RX ORDER — HYDROMORPHONE HYDROCHLORIDE 1 MG/ML
0.4 INJECTION, SOLUTION INTRAMUSCULAR; INTRAVENOUS; SUBCUTANEOUS
Status: DISCONTINUED | OUTPATIENT
Start: 2019-02-28 | End: 2019-02-28 | Stop reason: HOSPADM

## 2019-02-28 RX ORDER — SODIUM CHLORIDE, SODIUM GLUCONATE, SODIUM ACETATE, POTASSIUM CHLORIDE AND MAGNESIUM CHLORIDE 526; 502; 368; 37; 30 MG/100ML; MG/100ML; MG/100ML; MG/100ML; MG/100ML
1500 INJECTION, SOLUTION INTRAVENOUS ONCE
Status: COMPLETED | OUTPATIENT
Start: 2019-02-28 | End: 2019-02-28

## 2019-02-28 RX ORDER — SODIUM CHLORIDE, SODIUM LACTATE, POTASSIUM CHLORIDE, AND CALCIUM CHLORIDE .6; .31; .03; .02 G/100ML; G/100ML; G/100ML; G/100ML
250 INJECTION, SOLUTION INTRAVENOUS PRN
Status: DISCONTINUED | OUTPATIENT
Start: 2019-02-28 | End: 2019-02-28

## 2019-02-28 RX ORDER — ACETAMINOPHEN 500 MG
1000 TABLET ORAL EVERY 6 HOURS PRN
Status: DISCONTINUED | OUTPATIENT
Start: 2019-02-28 | End: 2019-03-04 | Stop reason: HOSPADM

## 2019-02-28 RX ORDER — CITRIC ACID/SODIUM CITRATE 334-500MG
30 SOLUTION, ORAL ORAL ONCE
Status: COMPLETED | OUTPATIENT
Start: 2019-02-28 | End: 2019-02-28

## 2019-02-28 RX ORDER — SODIUM CHLORIDE 9 MG/ML
INJECTION, SOLUTION INTRAVENOUS
Status: COMPLETED
Start: 2019-02-28 | End: 2019-02-28

## 2019-02-28 RX ORDER — MEPERIDINE HYDROCHLORIDE 25 MG/ML
12.5 INJECTION INTRAMUSCULAR; INTRAVENOUS; SUBCUTANEOUS
Status: DISCONTINUED | OUTPATIENT
Start: 2019-02-28 | End: 2019-02-28 | Stop reason: HOSPADM

## 2019-02-28 RX ORDER — AZITHROMYCIN 500 MG/5ML
500 INJECTION, POWDER, LYOPHILIZED, FOR SOLUTION INTRAVENOUS ONCE
Status: COMPLETED | OUTPATIENT
Start: 2019-02-28 | End: 2019-02-28

## 2019-02-28 RX ORDER — CALCIUM GLUCONATE 94 MG/ML
1 INJECTION, SOLUTION INTRAVENOUS
Status: DISCONTINUED | OUTPATIENT
Start: 2019-02-28 | End: 2019-03-01

## 2019-02-28 RX ORDER — HALOPERIDOL 5 MG/ML
1 INJECTION INTRAMUSCULAR
Status: DISCONTINUED | OUTPATIENT
Start: 2019-02-28 | End: 2019-02-28 | Stop reason: HOSPADM

## 2019-02-28 RX ORDER — OXYCODONE HYDROCHLORIDE 5 MG/1
5 TABLET ORAL EVERY 4 HOURS PRN
Status: ACTIVE | OUTPATIENT
Start: 2019-02-28 | End: 2019-03-01

## 2019-02-28 RX ORDER — KETOROLAC TROMETHAMINE 30 MG/ML
30 INJECTION, SOLUTION INTRAMUSCULAR; INTRAVENOUS EVERY 6 HOURS
Status: COMPLETED | OUTPATIENT
Start: 2019-02-28 | End: 2019-03-01

## 2019-02-28 RX ORDER — ACETAMINOPHEN 500 MG
1000 TABLET ORAL EVERY 6 HOURS
Status: DISCONTINUED | OUTPATIENT
Start: 2019-02-28 | End: 2019-02-28

## 2019-02-28 RX ORDER — ROPIVACAINE HYDROCHLORIDE 2 MG/ML
INJECTION, SOLUTION EPIDURAL; INFILTRATION; PERINEURAL
Status: COMPLETED
Start: 2019-02-28 | End: 2019-02-28

## 2019-02-28 RX ORDER — DEXTROSE, SODIUM CHLORIDE, SODIUM LACTATE, POTASSIUM CHLORIDE, AND CALCIUM CHLORIDE 5; .6; .31; .03; .02 G/100ML; G/100ML; G/100ML; G/100ML; G/100ML
INJECTION, SOLUTION INTRAVENOUS CONTINUOUS
Status: DISCONTINUED | OUTPATIENT
Start: 2019-02-28 | End: 2019-02-28

## 2019-02-28 RX ADMIN — Medication 30 ML: at 07:27

## 2019-02-28 RX ADMIN — SODIUM CHLORIDE 2.5 MILLION UNITS: 9 INJECTION, SOLUTION INTRAVENOUS at 00:38

## 2019-02-28 RX ADMIN — FAMOTIDINE 20 MG: 10 INJECTION INTRAVENOUS at 07:27

## 2019-02-28 RX ADMIN — MAGNESIUM SULFATE HEPTAHYDRATE 2 G/HR: 40 INJECTION, SOLUTION INTRAVENOUS at 15:52

## 2019-02-28 RX ADMIN — SODIUM CHLORIDE, POTASSIUM CHLORIDE, SODIUM LACTATE AND CALCIUM CHLORIDE: 600; 310; 30; 20 INJECTION, SOLUTION INTRAVENOUS at 17:58

## 2019-02-28 RX ADMIN — SODIUM CHLORIDE, SODIUM GLUCONATE, SODIUM ACETATE, POTASSIUM CHLORIDE AND MAGNESIUM CHLORIDE 1000 ML: 526; 502; 368; 37; 30 INJECTION, SOLUTION INTRAVENOUS at 07:26

## 2019-02-28 RX ADMIN — AZITHROMYCIN FOR INJECTION INJECTION, POWDER, LYOPHILIZED, FOR SOLUTION 500 MG: 500 INJECTION INTRAVENOUS at 07:26

## 2019-02-28 RX ADMIN — KETOROLAC TROMETHAMINE 30 MG: 30 INJECTION, SOLUTION INTRAMUSCULAR; INTRAVENOUS at 18:11

## 2019-02-28 RX ADMIN — ROPIVACAINE HYDROCHLORIDE 100 ML: 2 INJECTION, SOLUTION EPIDURAL; INFILTRATION at 01:52

## 2019-02-28 RX ADMIN — ACETAMINOPHEN 1000 MG: 500 TABLET ORAL at 12:20

## 2019-02-28 RX ADMIN — SODIUM CHLORIDE 2.5 MILLION UNITS: 9 INJECTION, SOLUTION INTRAVENOUS at 04:47

## 2019-02-28 RX ADMIN — METOCLOPRAMIDE 10 MG: 5 INJECTION, SOLUTION INTRAMUSCULAR; INTRAVENOUS at 07:27

## 2019-02-28 RX ADMIN — SODIUM CHLORIDE, POTASSIUM CHLORIDE, SODIUM LACTATE AND CALCIUM CHLORIDE: 600; 310; 30; 20 INJECTION, SOLUTION INTRAVENOUS at 01:52

## 2019-02-28 RX ADMIN — ACETAMINOPHEN 1000 MG: 500 TABLET ORAL at 18:11

## 2019-02-28 RX ADMIN — Medication 125 ML/HR: at 09:15

## 2019-02-28 RX ADMIN — BETAMETHASONE SODIUM PHOSPHATE AND BETAMETHASONE ACETATE 12 MG: 3; 3 INJECTION, SUSPENSION INTRA-ARTICULAR; INTRALESIONAL; INTRAMUSCULAR at 00:37

## 2019-02-28 RX ADMIN — SODIUM CITRATE AND CITRIC ACID MONOHYDRATE 30 ML: 500; 334 SOLUTION ORAL at 07:27

## 2019-02-28 RX ADMIN — Medication 24 MILLI-UNITS/MIN: at 01:23

## 2019-02-28 RX ADMIN — METOCLOPRAMIDE HYDROCHLORIDE 10 MG: 5 INJECTION INTRAMUSCULAR; INTRAVENOUS at 07:27

## 2019-02-28 RX ADMIN — ROPIVACAINE HYDROCHLORIDE 100 ML: 2 INJECTION, SOLUTION EPIDURAL; INFILTRATION; PERINEURAL at 01:52

## 2019-02-28 RX ADMIN — SODIUM CHLORIDE, SODIUM LACTATE, POTASSIUM CHLORIDE, CALCIUM CHLORIDE AND DEXTROSE MONOHYDRATE: 5; 600; 310; 30; 20 INJECTION, SOLUTION INTRAVENOUS at 04:27

## 2019-02-28 RX ADMIN — ENOXAPARIN SODIUM 40 MG: 100 INJECTION SUBCUTANEOUS at 21:11

## 2019-02-28 RX ADMIN — KETOROLAC TROMETHAMINE 30 MG: 30 INJECTION, SOLUTION INTRAMUSCULAR; INTRAVENOUS at 12:19

## 2019-02-28 RX ADMIN — ONDANSETRON 4 MG: 2 INJECTION INTRAMUSCULAR; INTRAVENOUS at 17:21

## 2019-02-28 ASSESSMENT — PATIENT HEALTH QUESTIONNAIRE - PHQ9
SUM OF ALL RESPONSES TO PHQ9 QUESTIONS 1 AND 2: 0
1. LITTLE INTEREST OR PLEASURE IN DOING THINGS: NOT AT ALL
2. FEELING DOWN, DEPRESSED, IRRITABLE, OR HOPELESS: NOT AT ALL

## 2019-02-28 NOTE — LACTATION NOTE
This note was copied from a baby's chart.  Initial visit, Baby in NICU, 6 hours old. Mother to Postpartum unit on Magnesium Sulfate, initiated pumping. Flange 30.5 fit appropriate, pump settings speed, 80 decrease to 60 after 2 minutes, suction 35% to comfort x 15 minutes then hand express x 2 minutes on each breast, every 3 hours or 8-10 pump sessions in 24 hours. Encouraged mother to watch hand expression video, [employer] breastfeeding. Information sheet on safe storage & handling of BM, given with review.

## 2019-02-28 NOTE — PROGRESS NOTES
Labor Progress Note:  Pt known to me from last night, admitted for IOL secondary to preE w/ severe features  By Bps and sxs (blurry visions).  pt has had hx of gHTN since 20wks this pregnancy.  Required IV hydralazine x1 early this AM after arrival.      S:  Pt reports doing well, denies HA/RUQ pain/vision changes.    Patient Vitals for the past 2 hrs:   BP Temp Temp src Pulse SpO2   19 - - - (!) 105 97 %   19 1913 123/75 - - (!) 102 -   19 - 37.2 °C (99 °F) Temporal - -   19 - - - (!) 115 -   19 136/75 - - (!) 110 -   19 - - - (!) 109 93 %   19 - - - (!) 111 -     Gen: AAO, NAD  Ext: NT, 3+ edema, 2+ DTRs  Balloon came out easily with tug  SVE: 5/70/-3 @ 1925  UOP (cc/hr): 100/75/--/75    FHT: 130/mod variability/+ accels/no decels  toco: q1-5min ctx, irregular      A/P: 23 y.o.  @ 34w4d by lmp c/w 20wk US admitted for IOL secondary to pre-eclampsia with severe features  - IOL: s/p cytotec x2 then cook balloon with pitocin.  Still latentCont pit, currently at 10mUnits/min.  Consider AROM with descent of fetal head.    - pre-eclampsia: s/p hydralazine 5mg early this AM with Bps normal to mild range since.  Initially blurry vision, currently asx.     UOP adequate (decreased last 1hr, cont to monitor)   Labs wnl on arrival other than proteinura (1.4g), will repeat given anticipate length of induction  - FHT: cat I   42% EFW on admission US (2438g)  - GBS: unknown, on PCN        Zulema Jones MD  [facility], Women's [facility]        0000 Addendum:  Pt feeling well, no HA/RUQ pain/vision changes.  excellent UOP  Repeat SVE with minimal change, head better applied.  5/80/-2 AROM with copious clear.  Ctx not tracing well externally.  IUPC placed.  Then variable decel to 70, repeat exam unchanged, FSE placed for improved tracing with maternal repositioning.  Initial FSE not tracing well, replaced with good tracing, return to 140s  with moderate variability.    2+ DTRs      - Still latent  - Cont pit.   - FHT cat II with variable after AROM, reassuring with mod variability, + accels      Zulema Jones MD  [facility], [facility]'s [facility]      340 Addendum:  Pt comfortable, no HA/RUQ pain/vision changes      Patient Vitals for the past 2 hrs:   BP Temp Temp src Pulse Resp SpO2   19 - - - (!) 103 - -   19 - - - (!) 101 - 96 %   19 - - - 100 - -   19 142/76 - - (!) 106 - -   19 - - - 95 - -   19 129/78 - - 94 - -   19 - - - 95 - 95 %     SVE: 690/-1  FHT:140/mod ria/+accels/mild variable decels  [place]: q2-4min  UOP (cc/hr): 320/--/125    - Now active, ctx still slightly irregular, pit @ 28mU/min, MVUs difficult to calculate as peak off of chart, IUPC picking up with high resting tone.  Discussed w/ pt that now active anticipate cervical change to continue.  Discussed if without fetal descent concerned that cervix will not continue to dilate and may need  delivery.  Cont pit    - Bps normal to mild range, pt asx, good UOP      Zulema Jones MD  [facility], [facility]

## 2019-02-28 NOTE — OR SURGEON
Immediate Post OP Note    PreOp Diagnosis:    1. SIUP @ 34w5d   2. Pre-eclampsia with severe features   3. Arrest of active phase    PostOp Diagnosis:    S/p primary low transverse  delivery   Pre-eclampsia with severe features    Procedure(s):  PRIMARY C SECTION - Wound Class: Clean Contaminated    Surgeon(s):  Zulema Jones M.D.  Assist: FRANCHESCA Chavez MD (PGY1)    Anesthesiologist/Type of Anesthesia:  David/epidural       Specimens removed if any:  Placenta sent to pathology    Estimated Blood Loss: 600cc  IVF: 1000cc LR  UOP: 400cc    Findings: vigorous male infant, vertex, APGARs 8/9.  Wt pending at time of note.   Normal uterus, tubes, and ovaries.    Complications: none        2019 8:28 AM Zulema Jones M.D.

## 2019-02-28 NOTE — PROGRESS NOTES
Came to bedside to discuss plan of care.  Pt rechecked by RN, still without change 5-6/80/-2.  Fetal head has not come down.  Pt clinically well, discussed that her lack of change is concerning, mostly because the fetal head has not come down at all despite significant pitocin and ROM.  Discussed option of c/s now vs reassessment in another 2hrs.  Pt prefers c/s now.      I discussed w/ pt risks of surgery including pain, bleeding, infection, risk of damage to intraabdominal structures including bowel/bladder/ureters.  Pt confirms she is accepting of blood transfusion in case of emergency.  Reviewed risks of transfusion including transfusion reaction (fever, damage to heart/lungs/kidneys), risk of exposure to infectious disease including HIV and hepatitis.  All questions answered.  Consent signed      Zulema Jones MD  [facility], Women's Health

## 2019-02-28 NOTE — PROGRESS NOTES
1900 Report received, pt care assumed.   1923 Dr. Abad at bedside, balloon out. SVE 5/70/-3  2210 AMERICO Harley at bedside, SVE, no change.   2345 Dr. Abad at bedside.  2346 SVE, 5/80/-2 AROM clear fluid  Per Dr. Abad pitocin can be increased to 30.  0230 Pt c/o back pain. Position changed, Dr. Santamaria notified. AMERICO Harley updated on pt status.   0250 Epidural continuous rate increased to 12 per Dr. Santamaria.   0335 Dr. Abad at bedside. SVE 6/90-2.   0700  Report to MAL Qureshi RN. Dr. Abad notified of 0600 SVE.

## 2019-02-28 NOTE — OP REPORT
Operative Report  Date of Service: 19    PreOp Diagnosis:    1. LAISHA @ 34w5d   2. Pre-eclampsia with severe features   3. Arrest of active phase (6cm)    PostOp Diagnosis:    S/p primary low transverse  delivery   Pre-eclampsia with severe features    Procedure(s):  PRIMARY C SECTION - Wound Class: Clean Contaminated    Surgeon(s):  Zulema Jones M.D.  Assistant: Abdiel Chavez MD (PGY1)    Anesthesiologist/Type of Anesthesia:  No anesthesia staff entered./Spinal      Specimens removed if any:  Placenta sent to pathology    Estimated Blood Loss: 600cc  IVF: 1000cc LR  UOP: 400cc    Findings: vigorous male infant, vertex, APGARs 8/9.  Wt pending at time of note.   Normal uterus, tubes, and ovaries.    Complications: none    Procedure in Detail:  The pt ws taken to the operating room where epidural anesthesia was found to be adequate.  The patient was prepped and draped in a normal supine position with a wedge under the right hip.  A pfannensteil incision was made and taken down to the fascia with the scalpel, which was incised bilaterally with the scalpel.  The fascial incision was extended laterally with the Wilson scissors.  The rectus muscles were dissected off the rectus sheath with Wilson scissors superiorly and inferiorly.  There was separation of the rectus sheath superiorly and the peritoneum was entered bluntly, extended with stretching.  The bladder blade was placed.  The lower uterine segment was incised transversely and the endometrial cavity entered with a blunt finger. The hysterotomy was extended with cephalad-caudad stretching.  The infant's head was manually elevated to the level of the hysterotomy and delivered, followed atraumatically by the anterior shoulder, posterior shoulder and body with help of fundal pressure.  The infant was stimulated, bulb suctioned, and cord clamping was delayed x30 seconds then the cord was clamped x2, cut and the infant handed to nurse.  Cord gases were  sent.  The placenta delivered with gentle cord traction and uterine massage and was noted to be intact with 3-vessel cord, this was sent to pathology. The uterus was exteriorized, cleared of all clots and debris and closed in running, locked fashion with 0-chromic.  The uterus was returned to the abdomen, the gutters cleared of all clots and debris and the hysterotomy re-examined and noted to be hemostatic.  The rectus muscles were inspected, found to be hemostatic.  The fascia was closed with 0 Vicryl.  The subcutaneous tissues was irrigated. Bleeders were coagulated with the bovie.  The subcutaneous tissues were approximated with running 2-0 plain and the skin with subcuticular suture with 4-0 monocryl.  Dermabond was placed, let dry and followed by pressure dressing.  All counts were correct x3.  Pt went to recovery in good condition.  Infant went to NICU.      Vicky Jones M.D.

## 2019-02-28 NOTE — CARE PLAN
Problem: Fluid Volume:  Goal: Will maintain balanced intake and output    Intervention: Monitor, educate, and encourage compliance with therapeutic intake of liquids  Strict I&O's      Problem: Venous Thromboembolism (VTW)/Deep Vein Thrombosis (DVT) Prevention:  Goal: Patient will participate in Venous Thrombosis (VTE)/Deep Vein Thrombosis (DVT)Prevention Measures    Intervention: Ensure patient wears graduated elastic stockings (MODESTO hose) and/or SCDs, if ordered, when in bed or chair (Remove at least once per shift for skin check)  SCD's on throughout night      Problem: Knowledge Deficit  Goal: Knowledge of disease process/condition, treatment plan, diagnostic tests, and medications will improve    Intervention: Assess knowledge level of disease process/condition, treatment plan, diagnostic tests, and medications  Pt updated on POC throughout night and as changes happened.       Problem: Pain Management  Goal: Pain level will decrease to patient's comfort goal    Intervention: Follow pain managment plan developed in collaboration with patient and Interdisciplinary Team  Pain controled with epidural

## 2019-03-01 LAB — MAGNESIUM SERPL-MCNC: 5.5 MG/DL (ref 1.5–2.5)

## 2019-03-01 PROCEDURE — 700111 HCHG RX REV CODE 636 W/ 250 OVERRIDE (IP): Performed by: OBSTETRICS & GYNECOLOGY

## 2019-03-01 PROCEDURE — 36415 COLL VENOUS BLD VENIPUNCTURE: CPT

## 2019-03-01 PROCEDURE — 700102 HCHG RX REV CODE 250 W/ 637 OVERRIDE(OP): Performed by: STUDENT IN AN ORGANIZED HEALTH CARE EDUCATION/TRAINING PROGRAM

## 2019-03-01 PROCEDURE — A9270 NON-COVERED ITEM OR SERVICE: HCPCS | Performed by: OBSTETRICS & GYNECOLOGY

## 2019-03-01 PROCEDURE — 700112 HCHG RX REV CODE 229: Performed by: OBSTETRICS & GYNECOLOGY

## 2019-03-01 PROCEDURE — 700102 HCHG RX REV CODE 250 W/ 637 OVERRIDE(OP): Performed by: OBSTETRICS & GYNECOLOGY

## 2019-03-01 PROCEDURE — A9270 NON-COVERED ITEM OR SERVICE: HCPCS | Performed by: STUDENT IN AN ORGANIZED HEALTH CARE EDUCATION/TRAINING PROGRAM

## 2019-03-01 PROCEDURE — 700111 HCHG RX REV CODE 636 W/ 250 OVERRIDE (IP): Performed by: ANESTHESIOLOGY

## 2019-03-01 PROCEDURE — 770002 HCHG ROOM/CARE - OB PRIVATE (112)

## 2019-03-01 PROCEDURE — 83735 ASSAY OF MAGNESIUM: CPT

## 2019-03-01 RX ORDER — OXYCODONE HYDROCHLORIDE 10 MG/1
10 TABLET ORAL EVERY 4 HOURS PRN
Status: DISCONTINUED | OUTPATIENT
Start: 2019-03-01 | End: 2019-03-04 | Stop reason: HOSPADM

## 2019-03-01 RX ORDER — KETOROLAC TROMETHAMINE 30 MG/ML
30 INJECTION, SOLUTION INTRAMUSCULAR; INTRAVENOUS EVERY 6 HOURS
Status: DISCONTINUED | OUTPATIENT
Start: 2019-03-01 | End: 2019-03-01

## 2019-03-01 RX ORDER — IBUPROFEN 800 MG/1
800 TABLET ORAL EVERY 8 HOURS
Status: DISCONTINUED | OUTPATIENT
Start: 2019-03-01 | End: 2019-03-04 | Stop reason: HOSPADM

## 2019-03-01 RX ORDER — OXYCODONE HYDROCHLORIDE 5 MG/1
5 TABLET ORAL EVERY 4 HOURS PRN
Status: DISCONTINUED | OUTPATIENT
Start: 2019-03-01 | End: 2019-03-04 | Stop reason: HOSPADM

## 2019-03-01 RX ORDER — ONDANSETRON 2 MG/ML
4 INJECTION INTRAMUSCULAR; INTRAVENOUS EVERY 6 HOURS PRN
Status: DISCONTINUED | OUTPATIENT
Start: 2019-03-01 | End: 2019-03-04 | Stop reason: HOSPADM

## 2019-03-01 RX ORDER — IBUPROFEN 800 MG/1
800 TABLET ORAL 3 TIMES DAILY
Status: DISCONTINUED | OUTPATIENT
Start: 2019-03-01 | End: 2019-03-01

## 2019-03-01 RX ORDER — ONDANSETRON 4 MG/1
4 TABLET, ORALLY DISINTEGRATING ORAL EVERY 6 HOURS PRN
Status: DISCONTINUED | OUTPATIENT
Start: 2019-03-01 | End: 2019-03-04 | Stop reason: HOSPADM

## 2019-03-01 RX ADMIN — OXYCODONE HYDROCHLORIDE 10 MG: 10 TABLET ORAL at 16:56

## 2019-03-01 RX ADMIN — KETOROLAC TROMETHAMINE 30 MG: 30 INJECTION, SOLUTION INTRAMUSCULAR; INTRAVENOUS at 06:14

## 2019-03-01 RX ADMIN — DOCUSATE SODIUM 100 MG: 100 CAPSULE, LIQUID FILLED ORAL at 22:15

## 2019-03-01 RX ADMIN — OXYCODONE HYDROCHLORIDE 5 MG: 5 TABLET ORAL at 13:03

## 2019-03-01 RX ADMIN — IBUPROFEN 800 MG: 800 TABLET, FILM COATED ORAL at 16:56

## 2019-03-01 RX ADMIN — IBUPROFEN 800 MG: 800 TABLET, FILM COATED ORAL at 09:22

## 2019-03-01 RX ADMIN — KETOROLAC TROMETHAMINE 30 MG: 30 INJECTION, SOLUTION INTRAMUSCULAR; INTRAVENOUS at 00:33

## 2019-03-01 RX ADMIN — ENOXAPARIN SODIUM 40 MG: 100 INJECTION SUBCUTANEOUS at 22:13

## 2019-03-01 RX ADMIN — Medication 1 TABLET: at 06:14

## 2019-03-01 RX ADMIN — OXYCODONE HYDROCHLORIDE 5 MG: 5 TABLET ORAL at 22:12

## 2019-03-01 RX ADMIN — SIMETHICONE 80 MG: 80 TABLET, CHEWABLE ORAL at 22:15

## 2019-03-01 ASSESSMENT — EDINBURGH POSTNATAL DEPRESSION SCALE (EPDS)
I HAVE FELT SAD OR MISERABLE: NO, NOT AT ALL
THE THOUGHT OF HARMING MYSELF HAS OCCURRED TO ME: NEVER
I HAVE BEEN SO UNHAPPY THAT I HAVE HAD DIFFICULTY SLEEPING: NOT AT ALL
I HAVE BEEN SO UNHAPPY THAT I HAVE BEEN CRYING: NO, NEVER
I HAVE BLAMED MYSELF UNNECESSARILY WHEN THINGS WENT WRONG: NO, NEVER
I HAVE BEEN ABLE TO LAUGH AND SEE THE FUNNY SIDE OF THINGS: AS MUCH AS I ALWAYS COULD
THINGS HAVE BEEN GETTING ON TOP OF ME: NO, I HAVE BEEN COPING AS WELL AS EVER
I HAVE LOOKED FORWARD WITH ENJOYMENT TO THINGS: AS MUCH AS I EVER DID
I HAVE FELT SCARED OR PANICKY FOR NO GOOD REASON: NO, NOT AT ALL
I HAVE BEEN ANXIOUS OR WORRIED FOR NO GOOD REASON: HARDLY EVER

## 2019-03-01 NOTE — PROGRESS NOTES
Joceline from Lab called with critical result of Magnesium at 5.5. Critical lab result read back to Yue.   Reported to bedside RN, Janay.   Levels are within parameters set by attending physician.

## 2019-03-01 NOTE — PROGRESS NOTES
Post Partum Progress Note    Name:   Komal Hall   Date/Time:  3/1/2019 - 6:45 AM  Chief Admitting Dx:  Delivery  Labor and delivery, indication for care  Delivery Type:   for failure to progress, pre-eclampsia  Post-Op/Post Partum Days #:  1    Subjective:  Abdominal pain: yes  Ambulating:   no  Tolerating liquids:  yes  Tolerating food:  yes common adult  Flatus:   yes  BM:    no  Bleeding:   with a small amount of bleeding  Voiding:   No - ibrahim in place  Dizziness:   no  Feeding:   breast    Vitals:    19 0300 19 0400 19 0500 19 0600   BP: 127/87 134/90 135/90 133/94   Pulse: 83 86 83 77   Resp: 16 16 16 18   Temp:    37.1 °C (98.8 °F)   TempSrc:    Axillary   SpO2: 96% 96% 95% 95%   Weight:       Height:           Exam:  Breast: Lactating yes  Abdomen: Abdomen soft, non-tender. BS normal. No masses,  No organomegaly  Fundal Tenderness:  no  Fundus Firm: yes  Incision: dry and intact dressing in place  Below umbilicus: yes  Perineum: perineum intact  Lochia: mild  Extremities: Normal extremities, peripheral pulses and reflexes normal, no edema, redness or tenderness in the calves or thighs    Meds:  Current Facility-Administered Medications   Medication Dose   • oxytocin (PITOCIN) infusion (for postpartum)  2,000 mL/hr    Followed by   • oxytocin (PITOCIN) infusion (for postpartum)   mL/hr   • oxyCODONE immediate-release (ROXICODONE) tablet 5 mg  5 mg   • oxyCODONE immediate release (ROXICODONE) tablet 10 mg  10 mg   • HYDROmorphone pf (DILAUDID) injection 0.2 mg  0.2 mg   • HYDROmorphone pf (DILAUDID) injection 0.4 mg  0.4 mg   • ondansetron (ZOFRAN) syringe/vial injection 4 mg  4 mg   • diphenhydrAMINE (BENADRYL) injection 12.5 mg  12.5 mg   • diphenhydrAMINE (BENADRYL) injection 12.5 mg  12.5 mg    Or   • diphenhydrAMINE (BENADRYL) injection 25 mg  25 mg    Or   • naloxone (NARCAN) 0.4 mg in NS 1,000 mL infusion  0.4 mg   • LR infusion     • enoxaparin  (LOVENOX) inj 40 mg  40 mg   • docusate sodium (COLACE) capsule 100 mg  100 mg   • bisacodyl (DULCOLAX) suppository 10 mg  10 mg   • simethicone (MYLICON) chewable tab 80 mg  80 mg   • magnesium hydroxide (MILK OF MAGNESIA) suspension 30 mL  30 mL   • prenatal plus vitamin (STUARTNATAL 1+1) 27-1 MG tablet 1 Tab  1 Tab   • acetaminophen (TYLENOL) tablet 1,000 mg  1,000 mg   • magnesium sulfate 40 g/1000mL infusion  2 g/hr   • calcium GLUConate injection 1 g  1 g   • ondansetron (ZOFRAN) syringe/vial injection 4 mg  4 mg   • calcium carbonate (TUMS) chewable tab 500 mg  500 mg       Labs:   Recent Labs      19   0048  19   1802   WBC  10.9*  15.1*  17.4*   RBC  4.86  4.47  4.29   HEMOGLOBIN  12.9  12.1  11.7*   HEMATOCRIT  40.8  37.5  36.0*   MCV  84.0  83.9  83.9   MCH  26.5*  27.1  27.3   MCHC  31.6*  32.3*  32.5*   RDW  45.8  46.7  46.4   PLATELETCT  217  210  234   MPV  13.2*  12.9  13.1*       Assessment:  Chief Admitting Dx:  Delivery  Labor and delivery, indication for care  Delivery Type:   for failure to progress, pre-eclampsia  Tubal Ligation:  no    Plan:  Continue routine post partum care.  Encourage breastfeeding and ambulation.  DC magnesium sulfate @ 24hr PP.   Anticipate DC home on POD#3    AMERICO BestNYOLIE.

## 2019-03-01 NOTE — CONSULTS
Follow-up visit. MOB states she is using pump every 3 hours, and has her alarms set for the shift. She denies any issues or pain while using pump. Reminded she can take one 5 hour stretch at night to allow for rest. Encouraged her to have infant RN call if needs help with latching infant on in NICU.     Encouraged to call for support as needed.

## 2019-03-01 NOTE — PROGRESS NOTES
Assessment complete. Fundus firm, lochia light. VSS.  Voiding without difficulty. Incision dressing CDI. Pain controlled with prn and scheduled medications per mar. Will offer pain medications as they become available. Magnesium running at 2 grams and hr, with LR running at 75mLs/hr. FOB and family at bedside, bonding with pt. POC discussed with pt. Encouraged to call with needs. Call light in place.

## 2019-03-02 PROCEDURE — 700102 HCHG RX REV CODE 250 W/ 637 OVERRIDE(OP): Performed by: NURSE PRACTITIONER

## 2019-03-02 PROCEDURE — A9270 NON-COVERED ITEM OR SERVICE: HCPCS | Performed by: STUDENT IN AN ORGANIZED HEALTH CARE EDUCATION/TRAINING PROGRAM

## 2019-03-02 PROCEDURE — 700111 HCHG RX REV CODE 636 W/ 250 OVERRIDE (IP): Performed by: OBSTETRICS & GYNECOLOGY

## 2019-03-02 PROCEDURE — 700102 HCHG RX REV CODE 250 W/ 637 OVERRIDE(OP): Performed by: STUDENT IN AN ORGANIZED HEALTH CARE EDUCATION/TRAINING PROGRAM

## 2019-03-02 PROCEDURE — A9270 NON-COVERED ITEM OR SERVICE: HCPCS | Performed by: OBSTETRICS & GYNECOLOGY

## 2019-03-02 PROCEDURE — 700102 HCHG RX REV CODE 250 W/ 637 OVERRIDE(OP): Performed by: OBSTETRICS & GYNECOLOGY

## 2019-03-02 PROCEDURE — A9270 NON-COVERED ITEM OR SERVICE: HCPCS | Performed by: NURSE PRACTITIONER

## 2019-03-02 PROCEDURE — 770002 HCHG ROOM/CARE - OB PRIVATE (112)

## 2019-03-02 PROCEDURE — 700112 HCHG RX REV CODE 229: Performed by: OBSTETRICS & GYNECOLOGY

## 2019-03-02 RX ADMIN — IBUPROFEN 800 MG: 800 TABLET, FILM COATED ORAL at 08:15

## 2019-03-02 RX ADMIN — SIMETHICONE 80 MG: 80 TABLET, CHEWABLE ORAL at 08:15

## 2019-03-02 RX ADMIN — OXYCODONE HYDROCHLORIDE 10 MG: 10 TABLET ORAL at 11:34

## 2019-03-02 RX ADMIN — OXYCODONE HYDROCHLORIDE 5 MG: 5 TABLET ORAL at 22:20

## 2019-03-02 RX ADMIN — DOCUSATE SODIUM 100 MG: 100 CAPSULE, LIQUID FILLED ORAL at 08:15

## 2019-03-02 RX ADMIN — IBUPROFEN 800 MG: 800 TABLET, FILM COATED ORAL at 16:59

## 2019-03-02 RX ADMIN — OXYCODONE HYDROCHLORIDE 5 MG: 5 TABLET ORAL at 16:59

## 2019-03-02 RX ADMIN — IBUPROFEN 800 MG: 800 TABLET, FILM COATED ORAL at 01:10

## 2019-03-02 RX ADMIN — OXYCODONE HYDROCHLORIDE 5 MG: 5 TABLET ORAL at 06:44

## 2019-03-02 RX ADMIN — ENOXAPARIN SODIUM 40 MG: 100 INJECTION SUBCUTANEOUS at 22:20

## 2019-03-02 RX ADMIN — Medication 1 TABLET: at 06:44

## 2019-03-02 RX ADMIN — ANTACID TABLETS 500 MG: 500 TABLET, CHEWABLE ORAL at 06:44

## 2019-03-02 NOTE — CARE PLAN
Problem: Altered physiologic condition related to postoperative  delivery  Goal: Patient physiologically stable as evidenced by normal lochia, palpable uterine involution and vital signs within normal limits  Outcome: PROGRESSING AS EXPECTED  Fundus firm, lochia light     Problem: Alteration in comfort related to surgical incision and/or after birth pains  Goal: Patient is able to ambulate, care for self and infant with acceptable pain level  Outcome: PROGRESSING AS EXPECTED  Patient ambulating, caring for self.

## 2019-03-02 NOTE — PROGRESS NOTES
1900 Patient not in room, visiting infant in NICU.  2200 Patient in NICU.  2210 Patient back from NICU. Assessment completed, fundus firm, lochia light. ABD incision with absorbent dressing detached at some parts. Plan of care reviewed, verbalized understanding. Patient will call if pain med intervention needed.

## 2019-03-02 NOTE — LACTATION NOTE
followup visit with MOB who is currently pumping. Flange fit appropriate, MOB denies pain with pumping. She is concerned about not seeing milk in the pump bottles, so we discussed how the breasts make milk.Encouraged to continue pumping as we do not expect milk for another day or 2.  She reports her breasts were leaking at baby's bedside, and I encouraged her to ask the baby's RN for help with pumping at bedside next time she visits.  She reports her breasts did grow during pregnancy. She did have gestational hypertension treated with Mag sulfate.  Plan:  Pump 8 times every 24 hours with one stretch of sleep between 10 pm and 3 am.   Pump at 3 am and 6 am   pump every 2-3 hours during waking hours 6 am- 10 pm

## 2019-03-02 NOTE — PROGRESS NOTES
0815-Patient alert, oriented.  VSS.  Family at bedside.  Fundus firm @ u, lochia light.  Patient voiding without difficulty.  Patient passing flatus.  Patient medicated for complaints of pain earlier this morning and states good control with oral meds.  Abdominal incision intact without erythema, swelling, or drainage.  Patient bonding well with infant, encouraged to call with needs.  Will continue to monitor.  1420- Report given to LENIN Eli.

## 2019-03-02 NOTE — PROGRESS NOTES
Obstetrics & Gynecology Post-Delivery Progress Note    Date of Service  3/2/2019    23 y.o.  2 s/p , Low Transverse  for Preeclampsia and FTP  Delivery date: 2019 PO #2  Breastfeeding: Yes, pumping for baby in NICU    Events  BPs ranging in 120s-140s/90s, highest was yesterday at 1403234/100, no medications currently for BP , magnesium sulfate d/c'd 3/1/19 at 0700    Subjective  Pain: Yes,  location incisional and controlled  Bleeding: lochia minimal  PO's: taking regular diet  Voiding: without difficulty  Ambulating: yes, around room, still taking wc to nicu  Feeding: pumping  Pt denies headache, visual changes or epigastric pain.    Objective  Temp:  [36.8 °C (98.2 °F)-37.4 °C (99.4 °F)] 37.4 °C (99.4 °F)  Pulse:  [] 100  Resp:  [17-19] 17  BP: (129-148)/() 129/94  SpO2:  [93 %-96 %] 96 %    Physical Exam  General: well and resting  Chest/Breasts: nipples intact and breasts soft  Fundus: firm, below umbilicus and nontender  Incision: healing well, no significant drainage, no dehiscence and no significant erythema  Perineum: deferred  Extremities: 1+ edema, +1 reflexes to bilateral patella      Lab Results   Component Value Date    RBC 4.29 2019    ABOGROUP O 10/25/2018    RH POS 10/25/2018     Immunization History   Administered Date(s) Administered   • Tdap Vaccine 2019       Assessment/Plan  Komal Hall is a 23 y.o.  postop day 2 s/p , Low Transverse .    1. Post care: meeting all goals  2. Hemodynamics: stable  3. Pain: controlled  4. PNL:   Lab Results   Component Value Date    RH POS 10/25/2018    RUBELLAIGG 11.50 10/25/2018     5. Method of Feeding: plans to breastfeed, nipple assessment done  6. Method of Contraception: NA  7. Vaccinations:   Immunization History   Administered Date(s) Administered   • Tdap Vaccine 2019     8. Disposition: likely home postop day 3-4   9. Encouraged to ambulate to NICU today    VTE prophylaxis:  enoxaparin daily    KOFFI Garner.

## 2019-03-03 PROCEDURE — 700102 HCHG RX REV CODE 250 W/ 637 OVERRIDE(OP): Performed by: OBSTETRICS & GYNECOLOGY

## 2019-03-03 PROCEDURE — A9270 NON-COVERED ITEM OR SERVICE: HCPCS | Performed by: OBSTETRICS & GYNECOLOGY

## 2019-03-03 PROCEDURE — 700102 HCHG RX REV CODE 250 W/ 637 OVERRIDE(OP): Performed by: STUDENT IN AN ORGANIZED HEALTH CARE EDUCATION/TRAINING PROGRAM

## 2019-03-03 PROCEDURE — A9270 NON-COVERED ITEM OR SERVICE: HCPCS | Performed by: NURSE PRACTITIONER

## 2019-03-03 PROCEDURE — 700112 HCHG RX REV CODE 229: Performed by: OBSTETRICS & GYNECOLOGY

## 2019-03-03 PROCEDURE — A9270 NON-COVERED ITEM OR SERVICE: HCPCS | Performed by: STUDENT IN AN ORGANIZED HEALTH CARE EDUCATION/TRAINING PROGRAM

## 2019-03-03 PROCEDURE — 770002 HCHG ROOM/CARE - OB PRIVATE (112)

## 2019-03-03 PROCEDURE — 700102 HCHG RX REV CODE 250 W/ 637 OVERRIDE(OP): Performed by: NURSE PRACTITIONER

## 2019-03-03 PROCEDURE — 700111 HCHG RX REV CODE 636 W/ 250 OVERRIDE (IP): Performed by: OBSTETRICS & GYNECOLOGY

## 2019-03-03 RX ORDER — LABETALOL 100 MG/1
100 TABLET, FILM COATED ORAL TWICE DAILY
Status: DISCONTINUED | OUTPATIENT
Start: 2019-03-03 | End: 2019-03-04 | Stop reason: HOSPADM

## 2019-03-03 RX ADMIN — OXYCODONE HYDROCHLORIDE 10 MG: 10 TABLET ORAL at 21:40

## 2019-03-03 RX ADMIN — ENOXAPARIN SODIUM 40 MG: 100 INJECTION SUBCUTANEOUS at 21:39

## 2019-03-03 RX ADMIN — LABETALOL HYDROCHLORIDE 100 MG: 100 TABLET, FILM COATED ORAL at 17:34

## 2019-03-03 RX ADMIN — ANTACID TABLETS 500 MG: 500 TABLET, CHEWABLE ORAL at 06:30

## 2019-03-03 RX ADMIN — IBUPROFEN 800 MG: 800 TABLET, FILM COATED ORAL at 08:21

## 2019-03-03 RX ADMIN — SIMETHICONE 80 MG: 80 TABLET, CHEWABLE ORAL at 11:11

## 2019-03-03 RX ADMIN — LABETALOL HYDROCHLORIDE 100 MG: 100 TABLET, FILM COATED ORAL at 08:21

## 2019-03-03 RX ADMIN — Medication 1 TABLET: at 06:30

## 2019-03-03 RX ADMIN — DOCUSATE SODIUM 100 MG: 100 CAPSULE, LIQUID FILLED ORAL at 08:21

## 2019-03-03 RX ADMIN — IBUPROFEN 800 MG: 800 TABLET, FILM COATED ORAL at 17:34

## 2019-03-03 RX ADMIN — OXYCODONE HYDROCHLORIDE 5 MG: 5 TABLET ORAL at 06:30

## 2019-03-03 RX ADMIN — OXYCODONE HYDROCHLORIDE 5 MG: 5 TABLET ORAL at 17:34

## 2019-03-03 RX ADMIN — IBUPROFEN 800 MG: 800 TABLET, FILM COATED ORAL at 01:49

## 2019-03-03 RX ADMIN — OXYCODONE HYDROCHLORIDE 5 MG: 5 TABLET ORAL at 11:11

## 2019-03-03 NOTE — PROGRESS NOTES
Post Partum Progress Note    Name:   Komal Hall   Date/Time:  3/3/2019 - 6:59 AM  Chief Admitting Dx:  Delivery  Labor and delivery, indication for care  Delivery Type:   for failure to progress; Preeclampsia with severe features  Post-Op/Post Partum Days #:  3    Subjective:  Abdominal pain: no  Ambulating:   yes  Tolerating liquids:  yes  Tolerating food:  yes common adult  Flatus:   yes  BM:    no  Bleeding:   without any bleeding  Voiding:   yes  Dizziness:   no  Feeding:   breast    Vitals:    19 1800 19 2200 19 0200 19 0600   BP: 146/95 145/94 155/87 146/99   Pulse: 96 87 94 84   Resp:    Temp: 37 °C (98.6 °F) 36.6 °C (97.9 °F) 36.8 °C (98.3 °F) 36.6 °C (97.9 °F)   TempSrc: Temporal Temporal Temporal Temporal   SpO2: 99% 95% 96% 97%   Weight:       Height:           Exam:  Breast: Tenderness no  Abdomen: Abdomen soft, non-tender. BS normal. No masses,  No organomegaly  Fundal Tenderness:  no  Fundus Firm: yes  Incision: dry and intact  Below umbilicus: yes  Perineum: perineum intact  Lochia: mild  Extremities: Normal extremities, peripheral pulses and reflexes normal    Meds:  Current Facility-Administered Medications   Medication Dose   • labetalol (NORMODYNE) tablet 100 mg  100 mg   • oxyCODONE immediate-release (ROXICODONE) tablet 5 mg  5 mg   • oxyCODONE immediate release (ROXICODONE) tablet 10 mg  10 mg   • ondansetron (ZOFRAN) syringe/vial injection 4 mg  4 mg    Or   • ondansetron (ZOFRAN ODT) dispertab 4 mg  4 mg   • ibuprofen (MOTRIN) tablet 800 mg  800 mg   • oxytocin (PITOCIN) infusion (for postpartum)   mL/hr   • LR infusion     • enoxaparin (LOVENOX) inj 40 mg  40 mg   • docusate sodium (COLACE) capsule 100 mg  100 mg   • bisacodyl (DULCOLAX) suppository 10 mg  10 mg   • simethicone (MYLICON) chewable tab 80 mg  80 mg   • magnesium hydroxide (MILK OF MAGNESIA) suspension 30 mL  30 mL   • prenatal plus vitamin (STUARTNATAL 1+1) 27-1 MG  tablet 1 Tab  1 Tab   • acetaminophen (TYLENOL) tablet 1,000 mg  1,000 mg   • calcium carbonate (TUMS) chewable tab 500 mg  500 mg       Labs:   Recent Labs      19   1802   WBC  17.4*   RBC  4.29   HEMOGLOBIN  11.7*   HEMATOCRIT  36.0*   MCV  83.9   MCH  27.3   MCHC  32.5*   RDW  46.4   PLATELETCT  234   MPV  13.1*       Assessment:  Chief Admitting Dx:  Delivery  Labor and delivery, indication for care  Delivery Type:   for failure to progress; preeclampsia with severe features  Tubal Ligation:  no    Plan:  Continue routine post partum care.  Start Labetalol 100 mg BID with BP precautions  Lactation consult  Baby in NICU  Plan to stay until tomorrow POD 4    Xuan Burger M.D.

## 2019-03-03 NOTE — PROGRESS NOTES
Report received at 0700. Assessment completed: abdominal incision solomon. Patient ambulates by self. Fundus firm, lochia light rubra. Patient states pain 2/10; medicated per MAR. Patient would like medication offered when available. Bed in lowest locked position. Call light in place. All questions and concerns addressed. Will continue to monitor.

## 2019-03-03 NOTE — LACTATION NOTE
"Spoke with mom who states pumping is going \"fantastic\". Mom is now collecting approx. one ounce total both breasts every 3 hours. Mom has become established with WIC  And parents plan to  a pump tomm. Prior to discharge.   "

## 2019-03-03 NOTE — CARE PLAN
Problem: Bowel/Gastric:  Goal: Will not experience complications related to bowel motility  Outcome: PROGRESSING AS EXPECTED  Stool softener and gas x provided by this RN.     Problem: Potential for postpartum infection related to surgical incision, compromised uterine condition, urinary tract or respiratory compromise  Goal: Patient will be afebrile and free from signs and symptoms of infection  Outcome: PROGRESSING AS EXPECTED  Abdominal incision approximated. No erythema.   Intervention: Instruct patient in pericare and incisional care  Discussed splinting wound to help with surgical pain - verbalizes understanding.

## 2019-03-03 NOTE — CARE PLAN
Problem: Altered physiologic condition related to postoperative  delivery  Goal: Patient physiologically stable as evidenced by normal lochia, palpable uterine involution and vital signs within normal limits  Outcome: PROGRESSING AS EXPECTED  Fundus firm, lochia light     Problem: Alteration in comfort related to surgical incision and/or after birth pains  Goal: Patient is able to ambulate, care for self and infant with acceptable pain level  Outcome: PROGRESSING AS EXPECTED  Patient ambulating, caring for self. Ambulating to NICU to visit infant.

## 2019-03-03 NOTE — PROGRESS NOTES
1930 Assessment completed. Fundus firm, lochia light. ABD incision open to air, intact, no signs or symptoms of infection noted. Plan of care reviewed, verbalized understanding. Patient declines pain at this time, will call if pain med intervention needed.   2050 Patient leaving to NICU. Declines lovenox shot or pain med at this time. Requesting meds to be given when she returns from NICU.

## 2019-03-04 VITALS
SYSTOLIC BLOOD PRESSURE: 139 MMHG | OXYGEN SATURATION: 99 % | RESPIRATION RATE: 18 BRPM | HEIGHT: 63 IN | BODY MASS INDEX: 41.82 KG/M2 | HEART RATE: 80 BPM | WEIGHT: 236 LBS | TEMPERATURE: 97.8 F | DIASTOLIC BLOOD PRESSURE: 89 MMHG

## 2019-03-04 PROBLEM — Z34.03 SUPERVISION OF NORMAL FIRST PREGNANCY IN THIRD TRIMESTER: Status: RESOLVED | Noted: 2018-10-02 | Resolved: 2019-03-04

## 2019-03-04 PROCEDURE — A9270 NON-COVERED ITEM OR SERVICE: HCPCS | Performed by: OBSTETRICS & GYNECOLOGY

## 2019-03-04 PROCEDURE — A9270 NON-COVERED ITEM OR SERVICE: HCPCS | Performed by: NURSE PRACTITIONER

## 2019-03-04 PROCEDURE — 90686 IIV4 VACC NO PRSV 0.5 ML IM: CPT | Performed by: OBSTETRICS & GYNECOLOGY

## 2019-03-04 PROCEDURE — 700102 HCHG RX REV CODE 250 W/ 637 OVERRIDE(OP): Performed by: OBSTETRICS & GYNECOLOGY

## 2019-03-04 PROCEDURE — 700102 HCHG RX REV CODE 250 W/ 637 OVERRIDE(OP): Performed by: NURSE PRACTITIONER

## 2019-03-04 PROCEDURE — A9270 NON-COVERED ITEM OR SERVICE: HCPCS | Performed by: STUDENT IN AN ORGANIZED HEALTH CARE EDUCATION/TRAINING PROGRAM

## 2019-03-04 PROCEDURE — 700111 HCHG RX REV CODE 636 W/ 250 OVERRIDE (IP): Performed by: OBSTETRICS & GYNECOLOGY

## 2019-03-04 PROCEDURE — 700112 HCHG RX REV CODE 229: Performed by: OBSTETRICS & GYNECOLOGY

## 2019-03-04 PROCEDURE — 700102 HCHG RX REV CODE 250 W/ 637 OVERRIDE(OP): Performed by: STUDENT IN AN ORGANIZED HEALTH CARE EDUCATION/TRAINING PROGRAM

## 2019-03-04 PROCEDURE — 90471 IMMUNIZATION ADMIN: CPT

## 2019-03-04 RX ORDER — OXYCODONE HYDROCHLORIDE AND ACETAMINOPHEN 5; 325 MG/1; MG/1
1 TABLET ORAL EVERY 4 HOURS PRN
Qty: 15 TAB | Refills: 0 | Status: SHIPPED | OUTPATIENT
Start: 2019-03-04 | End: 2019-03-11

## 2019-03-04 RX ORDER — IBUPROFEN 800 MG/1
800 TABLET ORAL EVERY 8 HOURS
Qty: 30 TAB | Refills: 0 | Status: SHIPPED | OUTPATIENT
Start: 2019-03-04 | End: 2019-04-05

## 2019-03-04 RX ORDER — PSEUDOEPHEDRINE HCL 30 MG
100 TABLET ORAL 2 TIMES DAILY PRN
Qty: 60 CAP | Refills: 0 | Status: SHIPPED | OUTPATIENT
Start: 2019-03-04 | End: 2019-04-05

## 2019-03-04 RX ORDER — LABETALOL 100 MG/1
100 TABLET, FILM COATED ORAL 2 TIMES DAILY
Qty: 60 TAB | Refills: 0 | Status: SHIPPED | OUTPATIENT
Start: 2019-03-04 | End: 2019-04-05

## 2019-03-04 RX ADMIN — DOCUSATE SODIUM 100 MG: 100 CAPSULE, LIQUID FILLED ORAL at 09:06

## 2019-03-04 RX ADMIN — Medication 1 TABLET: at 06:27

## 2019-03-04 RX ADMIN — OXYCODONE HYDROCHLORIDE 5 MG: 5 TABLET ORAL at 11:34

## 2019-03-04 RX ADMIN — IBUPROFEN 800 MG: 800 TABLET, FILM COATED ORAL at 01:41

## 2019-03-04 RX ADMIN — OXYCODONE HYDROCHLORIDE 5 MG: 5 TABLET ORAL at 06:27

## 2019-03-04 RX ADMIN — LABETALOL HYDROCHLORIDE 100 MG: 100 TABLET, FILM COATED ORAL at 06:27

## 2019-03-04 RX ADMIN — IBUPROFEN 800 MG: 800 TABLET, FILM COATED ORAL at 09:00

## 2019-03-04 RX ADMIN — ANTACID TABLETS 500 MG: 500 TABLET, CHEWABLE ORAL at 06:27

## 2019-03-04 RX ADMIN — INFLUENZA A VIRUS A/MICHIGAN/45/2015 X-275 (H1N1) ANTIGEN (FORMALDEHYDE INACTIVATED), INFLUENZA A VIRUS A/SINGAPORE/INFIMH-16-0019/2016 IVR-186 (H3N2) ANTIGEN (FORMALDEHYDE INACTIVATED), INFLUENZA B VIRUS B/PHUKET/3073/2013 ANTIGEN (FORMALDEHYDE INACTIVATED), AND INFLUENZA B VIRUS B/MARYLAND/15/2016 BX-69A ANTIGEN (FORMALDEHYDE INACTIVATED) 0.5 ML: 15; 15; 15; 15 INJECTION, SUSPENSION INTRAMUSCULAR at 11:39

## 2019-03-04 NOTE — PROGRESS NOTES
Report received at 0700. Assessment completed: abdominal incision solomon - no s/s of infection. Patient ambulates by self. Fundus firm, lochia light rubra. Patient states pain 5/10; medicated per MAR. Patient would like medication offered when available. Bed in lowest locked position. Call light in place. All questions and concerns addressed. Will continue to monitor.

## 2019-03-04 NOTE — DISCHARGE INSTRUCTIONS
POSTPARTUM DISCHARGE INSTRUCTIONS FOR MOM    YOB: 1995   Age: 23 y.o.               Admit Date: 2019     Discharge Date: 3/4/2019  Attending Doctor:  SONIA Willis*                  Allergies:  Patient has no known allergies.    Discharged to home by car. Discharged via wheelchair, hospital escort: Yes.  Special equipment needed: Not Applicable  Belongings with: Personal  Be sure to schedule a follow-up appointment with your primary care doctor or any specialists as instructed.     Discharge Plan:   Diet Plan: Discussed  Activity Level: Discussed  Confirmed Follow up Appointment: Patient to Call and Schedule Appointment  Confirmed Symptoms Management: Discussed  Medication Reconciliation Updated: Yes    REASONS TO CALL YOUR OBSTETRICIAN:  1.   Persistent fever or shaking chills (Temperature higher than 100.4)  2.   Heavy bleeding (soaking more than 1 pad per hour); Passing clots  3.   Foul odor from vagina  4.   Mastitis (Breast infection; breast pain, chills, fever, redness)  5.   Urinary pain, burning or frequency    7.   Abdominal incision infection  8.   Severe depression longer than 24 hours    HAND WASHING  · Prior to handling the baby.  · Before breastfeeding or bottle feeding baby.  · After using the bathroom or changing the baby's diaper.    WOUND CARE  Ask your physician for additional care instructions.  In general:    ·  Incision:      · Keep clean and dry.    · Do NOT lift anything heavier than your baby for up to 6 weeks.    · There should not be any opening or pus.      VAGINAL CARE  · Nothing inside vagina for 6 weeks: no sexual intercourse, tampons or douching.  · Bleeding may continue for 2-4 weeks.  Amount may vary.    · Call your physician for heavy bleeding which means soaking more than 1 pad per hour    BIRTH CONTROL  · It is possible to become pregnant at any time after delivery and while breastfeeding.  · Plan to discuss a method of birth control with your  "physician at your follow up visit. visit.    DIET AND ELIMINATION  · Eating more fiber (bran cereal, fruits, and vegetables) and drinking plenty of fluids will help to avoid constipation.  · Urinary frequency after childbirth is normal.    POSTPARTUM BLUES  During the first few days after birth, you may experience a sense of the \"blues\" which may include impatience, irritability or even crying.  These feeling come and go quickly.  However, as many as 1 in 10 women experience emotional symptoms known as postpartum depression.    Postpartum depression:  May start as early as the second or third day after delivery or take several weeks or months to develop.  Symptoms of \"blues\" are present, but are more intense:  Crying spells; loss of appetite; feelings of hopelessness or loss of control; fear of touching the baby; over concern or no concern at all about the baby; little or no concern about your own appearance/caring for yourself; and/or inability to sleep or excessive sleeping.  Contact your physician if you are experiencing any of these symptoms.    Crisis Hotline:  · [place] Crisis Hotline:  0-007-NGFEWYU  Or 1-942.524.4865  · [place] Crisis Hotline:  1-822.928.4384  Or 981-931-6210    PREVENTING SHAKEN BABY:  If you are angry or stressed, PUT THE BABY IN THE CRIB, step away, take some deep breaths, and wait until you are calm to care for the baby.  DO NOT SHAKE THE BABY.  You are not alone, call a supporter for help.    · Crisis Call Center 24/7 crisis line 255-448-7874 or 1-775.994.8640  · You can also text them, text \"ANSWER\" to 774917    QUIT SMOKING/TOBACCO USE:  I understand the use of any tobacco products increases my chance of suffering from future heart disease and could cause other illnesses which may shorten my life. Quitting the use of tobacco products is the single most important thing I can do to improve my health. For further information on smoking / tobacco cessation call a Toll Free Quit Line at " 1-156.244.9762 (*National Cancer [place]) or 1-591.619.2156 (American Lung Association) or you can access the web based program at www.lungusa.org.    · [place] Tobacco Users Help Line:  (883) 588-2894       Toll Free: 1-849.216.7150  · Quit Tobacco Program [facility] (335)766-3836    DEPRESSION / SUICIDE RISK:  As you are discharged from this [facility], it is important to learn how to keep safe from harming yourself.    Recognize the warning signs:  · Abrupt changes in personality, positive or negative- including increase in energy   · Giving away possessions  · Change in eating patterns- significant weight changes-  positive or negative  · Change in sleeping patterns- unable to sleep or sleeping all the time   · Unwillingness or inability to communicate  · Depression  · Unusual sadness, discouragement and loneliness  · Talk of wanting to die  · Neglect of personal appearance   · Rebelliousness- reckless behavior  · Withdrawal from people/activities they love  · Confusion- inability to concentrate     If you or a loved one observes any of these behaviors or has concerns about self-harm, here's what you can do:  · Talk about it- your feelings and reasons for harming yourself  · Remove any means that you might use to hurt yourself (examples: pills, rope, extension cords, firearm)  · Get professional help from the community (Mental Health, Substance Abuse, psychological counseling)  · Do not be alone:Call your Safe Contact- someone whom you trust who will be there for you.  · Call your local CRISIS HOTLINE 943-3216 or 306-829-0763  · Call your local Children's Mobile Crisis Response Team [place] (955) 048-6472 or www.QPSoftware  · Call the toll free National Suicide Prevention Hotlines   · National Suicide Prevention Lifeline 495-139-CENN (3827)  · National Hope Line Network 800-SUICIDE (215-2333)    DISCHARGE SURVEY:  Thank you for choosing [facility].  We hope we  provided you with very good care.  You may be receiving a survey in the mail.  Please fill it out.  Your opinion is valuable to us.    ADDITIONAL EDUCATIONAL MATERIALS GIVEN TO PATIENT:        My signature on this form indicates that:  1.  I have reviewed and understand the above information  2.  My questions regarding this information have been answered to my satisfaction.  3.  I have formulated a plan with my discharge nurse to obtain my prescribed medication for home.    Breastfeeding Plan:    Try to pump at least 8 to 10 times in a 24 hr period.  Store in the refrigerator and transport safely in a cooler to the NICU.  Do skin to skin care with baby in the NICU when possible.  Make appointment with lactation for help when baby is able to start breastfeeding.  After baby's discharge work with WIC for more breastfeeding support. Also come to Breastfeeding [place] group at the [facility] as desired for group support.

## 2019-03-04 NOTE — PROGRESS NOTES
2000 Patient in NICU.  2100 Assessment completed. Fundus firm, lochia light. ABD incision open to air, intact, no signs of infection. Plan of care reviewed, verbalized understanding.

## 2019-03-04 NOTE — DISCHARGE PLANNING
Discharge Planning Assessment Post Partum    Reason for Referral: NICU-34.4 weeks  Address: [facility] [place] [address]  Phone: [phone]  Type of Living Situation: living with FOB  Mom Diagnosis: Pregnancy, pre-eclampsia  Baby Diagnosis: premature  Primary Language: Pashto and English    Name of Baby: Luis Blanton (: 19)  Father of the Baby: Luis Harden  Involved in baby’s care? Yes  Contact Information: [phone]  FOB is employed at [employer]    Prenatal Care: Yes  Mom's PCP: None  PCP for new baby: Pediatrician list provided    Support System: FOB and both parent's family  Coping/Bonding between mother & baby: Yes  Source of Feeding: breast  Supplies for Infant: prepared for infant    Mom's Insurance: Access to Healthcare and Medicaid FFS  Baby Covered on Insurance:Yes  Mother Employed/School: Not currently  Other children in the home/names & ages: 1st baby    Financial Hardship/Income: denies   Mom's Mental status: alert and oriented  Services used prior to admit: Medicaid and WIC    CPS History: denies  Psychiatric History: denies  Domestic Violence History: denies  Drug/ETOH History: denies    Resources Provided: pediatrician list, children and family resource list, counseling resources specializing in post partum depression, information to NICU Bootcamp  Referrals Made: diaper bank referral provided     Clearance for Discharge: Infant will be cleared to discharge home with parents once medically stable.    Ongoing Plan: SW will follow and assist as needed.

## 2019-03-04 NOTE — DISCHARGE SUMMARY
Discharge Summary:      Komal Hall      Admit Date:   2019  Discharge Date:  3/4/2019     Admitting diagnosis:  Delivery  Labor and delivery, indication for care  Discharge Diagnosis: Status post  for failure to progress.  Pregnancy Complications: preeclampsia  Tubal Ligation:  no        History:  Past Medical History:   Diagnosis Date   • Gestational hypertension, third trimester 2019     OB History    Para Term  AB Living   1 1   1   1   SAB TAB Ectopic Molar Multiple Live Births           0 1      # Outcome Date GA Lbr Justin/2nd Weight Sex Delivery Anes PTL Lv   1  19 34w5d  2.135 kg (4 lb 11.3 oz) M CS-LTranv EPI Y GA      Complications: Failure to Progress in First Stage           Patient has no known allergies.  Patient Active Problem List    Diagnosis Date Noted   • Postpartum care and examination immediately after delivery 2019   •  delivery delivered 2019   • Gestational hypertension, third trimester 2019   • Pilonidal cyst s/p drainage 9/28/18 10/02/2018        Hospital Course:   23 y.o. , now para 1, was admitted with the above mentioned diagnosis, underwent Induction of Labor,  for failure to progress. Patient postpartum course was unremarkable, with progressive advancement in diet , ambulation and toleration of oral analgesia. Patient without complaints today and desires discharge.      Vitals:    19 1000 19 1400 19 2200 19 0200   BP: 138/96 137/89 138/86 138/90   Pulse: 87 89 92 99   Resp: 17 17 18 18   Temp: 37 °C (98.6 °F) 37.2 °C (99 °F) 36.6 °C (97.9 °F) 36.7 °C (98.1 °F)   TempSrc: Temporal Temporal Temporal Temporal   SpO2: 97% 99% 97% 95%   Weight:       Height:           Current Facility-Administered Medications   Medication Dose   • labetalol (NORMODYNE) tablet 100 mg  100 mg   • oxyCODONE immediate-release (ROXICODONE) tablet 5 mg  5 mg   • oxyCODONE immediate release  (ROXICODONE) tablet 10 mg  10 mg   • ondansetron (ZOFRAN) syringe/vial injection 4 mg  4 mg    Or   • ondansetron (ZOFRAN ODT) dispertab 4 mg  4 mg   • ibuprofen (MOTRIN) tablet 800 mg  800 mg   • oxytocin (PITOCIN) infusion (for postpartum)   mL/hr   • LR infusion     • enoxaparin (LOVENOX) inj 40 mg  40 mg   • docusate sodium (COLACE) capsule 100 mg  100 mg   • bisacodyl (DULCOLAX) suppository 10 mg  10 mg   • simethicone (MYLICON) chewable tab 80 mg  80 mg   • magnesium hydroxide (MILK OF MAGNESIA) suspension 30 mL  30 mL   • prenatal plus vitamin (STUARTNATAL 1+1) 27-1 MG tablet 1 Tab  1 Tab   • acetaminophen (TYLENOL) tablet 1,000 mg  1,000 mg   • calcium carbonate (TUMS) chewable tab 500 mg  500 mg       Exam:  Breast Exam: negative  Abdomen: Abdomen soft, non-tender. BS normal. No masses,  No organomegaly  Fundus Non Tender: yes  Incision: dry and intact  Perineum: perineum intact  Extremity: extremities, peripheral pulses and reflexes normal, no edema, redness or tenderness in the calves or thighs, Homans sign is negative, no sign of DVT, feet normal, good pulses, normal color, temperature and sensation     Labs:         Activity:   Discharge to home  Pelvic Rest x 6 weeks    Assessment:  normal postpartum course  Discharge Assessment: Taking adequate diet and fluids, no heavy bleeding or foul discharge. Voiding without difficulty     [facility] in 5 weeks for vaginal ; 1 week for incision and BP check.      Discharge Meds:   Current Outpatient Prescriptions   Medication Sig Dispense Refill   • ibuprofen (MOTRIN) 800 MG Tab Take 1 Tab by mouth every 8 hours. 30 Tab 0   • labetalol (NORMODYNE) 100 MG Tab Take 1 Tab by mouth 2 Times a Day. 60 Tab 0   • docusate sodium 100 MG Cap Take 100 mg by mouth 2 times a day as needed for Constipation. 60 Cap 0   • oxyCODONE-acetaminophen (PERCOCET) 5-325 MG Tab Take 1 Tab by mouth every four hours as needed for up to 7 days. 15 Tab 0      Pt need to RT [facility] or ER if any of the following occur:  Fever over 100,5  Severe abd pain  Red streaks or painful masses in the breasts  Foul smelling d/c or lochia  Heavy vaginal bleeding saturating a pad per hour  S/s of PP depression  Unsure of desired BCM  Follow up in 1 week for incision and BP check  Continue Labetalol at home    Bre Petit C.N.M.

## 2019-03-04 NOTE — LACTATION NOTE
"This note was copied from a baby's chart.  Met with this NICU mother just after she pumped. Settings: 80 with suction at 15% \"to get used to the suction\", then when she sees milk the rate goes to 60 and a suction of 50%. She denies any nipple or breast pain with the increase in sx. She pumped a total of 60mls this time. She will be discharged today and will  a WIC pump at the [place] office on her way home. WIC will be her main lactation resource but can be seen in the NICU also, especially when baby starts to breastfeed.    PLAN:  Try to pump at least 8 to 10 times in a 24 hr period.  Do skin to skin care with baby in the NICU when possible.  Make appointment with lactation for help when baby is able to start breastfeeding.  After baby's discharge work with WIC for more breastfeeding support. Also come to [facility] [place] group [facility] as desired for group support.    "

## 2019-03-04 NOTE — CARE PLAN
Problem: Altered physiologic condition related to postoperative  delivery  Goal: Patient physiologically stable as evidenced by normal lochia, palpable uterine involution and vital signs within normal limits  Outcome: PROGRESSING AS EXPECTED  Fundus firm, lochia light     Problem: Potential for postpartum infection related to surgical incision, compromised uterine condition, urinary tract or respiratory compromise  Goal: Patient will be afebrile and free from signs and symptoms of infection  Outcome: PROGRESSING AS EXPECTED  No signs or symptoms of infection noted.

## 2019-03-04 NOTE — CARE PLAN
Problem: Knowledge Deficit  Goal: Knowledge of disease process/condition, treatment plan, diagnostic tests, and medications will improve  Outcome: PROGRESSING AS EXPECTED  Reviewed cindy handouts on HTN; patient verbalizes understanding.

## 2019-03-04 NOTE — CARE PLAN
Problem: Alteration in comfort related to surgical incision and/or after birth pains  Goal: Patient is able to ambulate, care for self and infant with acceptable pain level  Outcome: PROGRESSING AS EXPECTED  Pain management plan made with patient at start of shift.     Problem: Potential knowledge deficit related to lack of understanding of self and  care  Goal: Patient will verbalize understanding of self and infant care  Outcome: PROGRESSING AS EXPECTED  Discharge education for patient reviewed; patient verbalizes understanding. Infant in ICN.

## 2019-03-11 ENCOUNTER — POST PARTUM (OUTPATIENT)
Dept: OBGYN | Facility: CLINIC | Age: 24
End: 2019-03-11
Payer: COMMERCIAL

## 2019-03-11 VITALS — WEIGHT: 219 LBS | BODY MASS INDEX: 38.79 KG/M2 | DIASTOLIC BLOOD PRESSURE: 72 MMHG | SYSTOLIC BLOOD PRESSURE: 124 MMHG

## 2019-03-11 DIAGNOSIS — Z09 POSTOP CHECK: ICD-10-CM

## 2019-03-11 DIAGNOSIS — Z98.891 S/P CESAREAN SECTION: ICD-10-CM

## 2019-03-11 PROCEDURE — 99024 POSTOP FOLLOW-UP VISIT: CPT | Performed by: OBSTETRICS & GYNECOLOGY

## 2019-03-11 NOTE — PROGRESS NOTES
Pt. here for C/S check delivered on 2/28/19  Currently : breast feeding   Pt. States no complaints   Chaperone offered accepted

## 2019-03-11 NOTE — PROGRESS NOTES
S: Patient presents today for incision check status post  on 2019 for arrest which is complicated by preeclampsia in labor.  Patient is doing well without complaints currently.  Pain is well controlled with medications.  She reports normal bowel and bladder functions.  She reports some mild intermittent spotting but no heavy bleeding.  Denies fevers or chills.  Denies depression.  Denies headaches or scotoma.  She has been on labetalol 100 twice daily and and took medication earlier this morning.  Patient is breast pumping-baby still in hospital due to prematurity.    O:  VSS, AF  Patient is awake alert oriented x3 no distress  HEENT normocephalic atraumatic  Neck is supple  Chest is clear to auscultation bilaterally  Cardiovascular regular rate rhythm  Abdomen is soft nondistended with normal bowel sounds.  Incision is intact and healing well without any sign of infection.  Extremities shows no clubbing cyanosis or edema or calf pain        Assessment and plan:  Postoperative day #12 status post -patient is doing well  Incision is healing well without sign of infection  Preeclampsia was complicated in pregnancy-patient is on labetalol twice daily for blood pressure control.  Blood pressure is well controlled currently  Patient will follow-up on Friday or Monday for blood pressure check to see if we can titrate medication  Precautions and plan of care were reviewed

## 2019-03-18 ENCOUNTER — NON-PROVIDER VISIT (OUTPATIENT)
Dept: OBGYN | Facility: CLINIC | Age: 24
End: 2019-03-18
Payer: COMMERCIAL

## 2019-03-18 VITALS — BODY MASS INDEX: 37.55 KG/M2 | WEIGHT: 212 LBS | SYSTOLIC BLOOD PRESSURE: 126 MMHG | DIASTOLIC BLOOD PRESSURE: 78 MMHG

## 2019-03-18 NOTE — PROGRESS NOTES
Pt here today for BP check  (PP)  Pt had C/section on 02/28/2019  WT:212 lb  BP: 126/78  Good # [phone]  Pt states she has been on labetalol since the day she delivered 02/28/2019 100 mg BID. States she was instructed by MD on 03/11/19 to take labetalol until yesterday 03/17/2019.    Bre Petit C.N.M. Notified and consulted with for patient instructions.   Per Bre Petit patient was informed that her BP is good and does not need to continue taking labetalol, but still is in the six week to watch out for preeclampsia in PP. Pt instructed to do self weekly BP checks at [facility] or [facility].pt was also given preeclampsia precautions, and to come back for her PP exam in 1-2 weeks. Pt verbalized understanding and will comply with instructions.

## 2019-04-01 ENCOUNTER — POST PARTUM (OUTPATIENT)
Dept: OBGYN | Facility: CLINIC | Age: 24
End: 2019-04-01
Payer: COMMERCIAL

## 2019-04-01 ENCOUNTER — HOSPITAL ENCOUNTER (OUTPATIENT)
Facility: MEDICAL CENTER | Age: 24
End: 2019-04-01
Attending: NURSE PRACTITIONER
Payer: COMMERCIAL

## 2019-04-01 VITALS — DIASTOLIC BLOOD PRESSURE: 74 MMHG | SYSTOLIC BLOOD PRESSURE: 112 MMHG | BODY MASS INDEX: 37.55 KG/M2 | WEIGHT: 212 LBS

## 2019-04-01 PROCEDURE — 87491 CHLMYD TRACH DNA AMP PROBE: CPT

## 2019-04-01 PROCEDURE — 87591 N.GONORRHOEAE DNA AMP PROB: CPT

## 2019-04-01 PROCEDURE — 0503F POSTPARTUM CARE VISIT: CPT | Performed by: NURSE PRACTITIONER

## 2019-04-01 PROCEDURE — 88175 CYTOPATH C/V AUTO FLUID REDO: CPT

## 2019-04-01 RX ORDER — ACETAMINOPHEN AND CODEINE PHOSPHATE 120; 12 MG/5ML; MG/5ML
1 SOLUTION ORAL DAILY
Qty: 84 TAB | Refills: 4 | Status: SHIPPED | OUTPATIENT
Start: 2019-04-01 | End: 2020-08-19

## 2019-04-01 ASSESSMENT — ENCOUNTER SYMPTOMS
GASTROINTESTINAL NEGATIVE: 1
MUSCULOSKELETAL NEGATIVE: 1
RESPIRATORY NEGATIVE: 1
NEUROLOGICAL NEGATIVE: 1
CARDIOVASCULAR NEGATIVE: 1
EYES NEGATIVE: 1
DEPRESSION: 1

## 2019-04-01 NOTE — PROGRESS NOTES
Pt here today for postpartum exam.  Delivery type: C Section 2/28/19  Not taking BP medicine. Per pt her BP has been normal around 125/72  Currently :mostly breast and bottle feeding  Desired BCM: pills  LMP: n/a  Last pap: pt states 7/2017 at [facility]. Requested but We never received result.  Phone # [phone]

## 2019-04-01 NOTE — PROGRESS NOTES
"  Subjective:    Komal Hall is a 23 y.o. female who presents for her postpartum exam 4 1/2 weeks following primary c/s. Her prenatal course was complicated by preeclampsia. She denies dysuria, vaginal bleeding, odor, itching or breast problems. She is breastfeeding 6x/day and formula feeding 2 feeds/day. She desires an OCP for her birth control method. Reports no sex prior to this appointment. Eating a regular diet without difficulty. Bowel movement are Normal.  The patient is not having any pain. Spotting in the morning only. Patient Denies Incisional pain, drainage or redness. Patient denies postpartum depression. When asked why she thinks that she might be scoring a 10 on her EPDS she started crying and says she is so tired.  Her partner is not very supportive and doesn't do any baby care.  Her mom and sister both work and she doesn't have anyone else.  She is eating poorly because she doesn't even have time to prepare food properly.  She denies SI or HI at this time but she reports the baby is extremely fussy and cries \"all the time\".  She would be interested in talking to someone regarding her feelings.   Review of Systems   Constitutional: Positive for malaise/fatigue.   HENT: Negative.    Eyes: Negative.    Respiratory: Negative.    Cardiovascular: Negative.    Gastrointestinal: Negative.    Genitourinary: Negative.    Musculoskeletal: Negative.    Skin: Negative.    Neurological: Negative.    Endo/Heme/Allergies: Negative.    Psychiatric/Behavioral: Positive for depression.   All other systems reviewed and are negative.      Problem List     Patient Active Problem List    Diagnosis Date Noted   • Postpartum care and examination immediately after delivery 2019   •  delivery delivered 2019   • Gestational hypertension, third trimester 2019   • Pilonidal cyst s/p drainage 9/28/18 10/02/2018       Objective  Physical Exam   Constitutional: She is oriented to person, place, " and time and well-developed, well-nourished, and in no distress.   HENT:   Head: Normocephalic and atraumatic.   Nose: Nose normal.   Eyes: Pupils are equal, round, and reactive to light. Conjunctivae and EOM are normal.   Neck: Normal range of motion. Neck supple. No thyromegaly present.   Cardiovascular: Normal rate, regular rhythm, normal heart sounds and intact distal pulses.    Pulmonary/Chest: Effort normal and breath sounds normal.   Abdominal: Soft. Bowel sounds are normal. She exhibits no distension. There is no tenderness. There is no rebound.   Musculoskeletal: Normal range of motion. She exhibits no edema.   Neurological: She is alert and oriented to person, place, and time. Gait normal.   Skin: Skin is warm and dry.   Incision C/D/I, some old glue present, no erythema or edema   Psychiatric: Memory, affect and judgment normal.   Weepy at times.    Nursing note and vitals reviewed.    See PE  Lab: H&H at d/c: 11.7/36.  /74   Wt 96.2 kg (212 lb)   LMP 06/30/2018   BMI 37.55 kg/m²      EPDS 10    Assessment:    1. PP care of lactating women   2. Exam WNL   3. Pap WNL but no report available  4. Desires contraception       Plan:    1. Breastfeeding support   2. Continue PNV   3. Contraceptive counseling - follow up w health dept,  [facility], or [facility] for other forms.  Rx for Micronor at this time d/t hx of preeclampsia and gest. HTN, Category 3 for JESUS.  Would like IUD or Nexplanon but cannot afford to pay here  4. Encouraged condom use x 7 days once starts POP and needs to switch to other form of contraception once starts weaning baby.  5. Discussed diet, exercise and resumption of sexual activity   6. Preconception guidance for next pregnancy if applicable. Preeclampsia risk factors. Folic acid for all women of childbearing age.   7. appt with DAVID Weir CNM

## 2019-04-02 ENCOUNTER — TELEPHONE (OUTPATIENT)
Dept: OBGYN | Facility: CLINIC | Age: 24
End: 2019-04-02

## 2019-04-02 LAB
C TRACH DNA GENITAL QL NAA+PROBE: NEGATIVE
CYTOLOGY REG CYTOL: NORMAL
N GONORRHOEA DNA GENITAL QL NAA+PROBE: NEGATIVE
SPECIMEN SOURCE: NORMAL

## 2019-04-02 NOTE — TELEPHONE ENCOUNTER
Patient has an appointment for maternal bonding and support referral on April 5.  Referred by Haily Weir CNM.  See her note.

## 2019-04-05 ENCOUNTER — GYNECOLOGY VISIT (OUTPATIENT)
Dept: OBGYN | Facility: CLINIC | Age: 24
End: 2019-04-05
Payer: COMMERCIAL

## 2019-04-05 VITALS — WEIGHT: 212 LBS | SYSTOLIC BLOOD PRESSURE: 118 MMHG | BODY MASS INDEX: 37.55 KG/M2 | DIASTOLIC BLOOD PRESSURE: 80 MMHG

## 2019-04-05 DIAGNOSIS — Z98.891 HISTORY OF CESAREAN DELIVERY: ICD-10-CM

## 2019-04-05 DIAGNOSIS — Z87.59 HISTORY OF PREGNANCY INDUCED HYPERTENSION: ICD-10-CM

## 2019-04-05 PROBLEM — O13.3 GESTATIONAL HYPERTENSION, THIRD TRIMESTER: Status: RESOLVED | Noted: 2019-01-28 | Resolved: 2019-04-05

## 2019-04-05 PROCEDURE — 99215 OFFICE O/P EST HI 40 MIN: CPT | Performed by: NURSE PRACTITIONER

## 2019-04-05 NOTE — NON-PROVIDER
New patient here for PPD  Per patient she thinks this started once baby came home from being in the [facility]  Per patient she has never had a hx of depression   Phone Number [phone]  Pharmacy Confirmed   Denies SI

## 2019-04-05 NOTE — PATIENT INSTRUCTIONS
Your care was provided today by: JARROD Garcia    Thank You for the opportunity to serve you.    You may receive a brief survey in the mail shortly regarding your visit today. Please take a few moments to complete the survey and return it; no postage is necessary. We are working to serve our patient population better, improve customer service and our patients overall experience and your input can help us to accomplish this. We thank you for your help and for the opportunity to serve you today and in the future.     Labs and Diagnostic Testing   Please note that if we have ordered labs or diagnostic testing, those results may be released to you on [employer] prior to my review. While we do our best to review your results as soon as possible, there are times when you may see your results prior to provider review. Of course, if you ever have any questions or concerns about your results, please contact our clinic.     Special Instructions:  Always call 9-1-1 immediately if you develop a life threatening emergency.  You may also contact the National Suicide Prevention Hotline: 9-179-647-JVEF    If you or a friend are looking for resources regarding postpartum depression or anxiety, you may call [employer] at 1-795.531.4569, #1 en [place], #2 for English. You may also text [phone]. You may also visit their web site: http://www.postpartum.net/    Unless told otherwise please take all medications as directed and complete prescription therapies. If you ever have concerns or questions, please contact our office.     Watch for the following signs that require additional evaluation: progressive lethargy or unresponsiveness, localized pain (chest, abdomen), shortness of breath, painful breathing, progressive vomiting with weakness, bloody stools, or new rash.

## 2019-04-05 NOTE — PROGRESS NOTES
"Subjective:     Komal Hall is a 23 y.o. female here today for evaluation and management of the following:     Date of assessment:   PCP: Pcp Pt States None  Persons in attendance: Patient, baby boy   Total face-to-face time: 45 minutes    History of  delivery  Preeclampsia   34 weeks       Postpartum depression  2019  Referred by: Haily Weir CNM      Pregnancy/Birth History: This was patient's first pregnancy, she now has a baby boy, 5 weeks old.  Patient reports that approximately 34 weeks she became preeclamptic, blood pressure was elevated and she had some swelling.  She was then induced, was in labor for 2 days but then was advised to complete a  due to lack of labor progress.  She reports she had a \"horrible \"experience in her , she felt like she could feel everything.  She reports since that time she is been healing well.  Her baby did spend 2 weeks in the NICU, she did comment that the nurses in the NICU were very supportive and helpful of her.    History of depression/anxiety: Patient denies any history of depression or anxiety.    Feeding: She is breast-feeding and also supplementing with bottle formula.    Partner/Social Support: Patient reports that her partner support is \"horrible.  \"She reports that her partner often works every day of the week, and he has not help much at home with her.  She also reports that he is not really supportive of her mental health, \"he just does not understand.  \"    History of Trauma: Denies any history of emotional, physical, sexual abuse.    Current living situation/household members: She lives at home with her partner and her new baby.    Denies history of thyroid disorder, anemia, vitamin D deficiency                      Current medicines (including changes today)  Current Outpatient Prescriptions   Medication Sig Dispense Refill   • norethindrone (MICRONOR) 0.35 MG tablet Take 1 Tab by mouth every day. 84 Tab 4   • Prenatal " MV-Min-Fe Fum-FA-DHA (PRENATAL 1 PO) Take  by mouth.       No current facility-administered medications for this visit.        She  has a past medical history of Gestational hypertension, third trimester (1/28/2019); Postpartum depression (4/5/2019); and Preeclampsia. She also has no past medical history of Addisons disease (HCC); Adrenal disorder (HCC); Allergy; Anemia; Anxiety; Arrhythmia; Arthritis; Asthma; Blood transfusion without reported diagnosis; Cancer ([facility]); Cataract; CHF (congestive heart failure) (HCC); Clotting disorder (HCC); COPD (chronic obstructive pulmonary disease) (HCC); Cushings syndrome (HCC); Diabetes (HCC); Diabetic neuropathy (HCC); GERD (gastroesophageal reflux disease); Glaucoma; Goiter; Head ache; Heart attack (HCC); Heart murmur; HIV (human immunodeficiency virus infection) ([facility]); Hyperlipidemia; IBD (inflammatory bowel disease); Kidney disease; Meningitis; Migraine; Muscle disorder; Osteoporosis; Parathyroid disorder (HCC); Pituitary disease (HCC); Pulmonary emphysema (HCC); Seizure ([facility]); Sickle cell disease (HCC); Stroke (HCC); Substance abuse (HCC); Thyroid disease; Tuberculosis; or Urinary tract infection.    She  has a past surgical history that includes primary c section (2/28/2019).     Social History     Social History   • Marital status: Single     Spouse name: N/A   • Number of children: N/A   • Years of education: N/A     Social History Main Topics   • Smoking status: Never Smoker   • Smokeless tobacco: Never Used   • Alcohol use No   • Drug use: No   • Sexual activity: Yes     Partners: Male     Birth control/ protection: Pill      Comment: was taking pill for bc     Other Topics Concern   • Not on file     Social History Narrative   • No narrative on file           Family History   Problem Relation Age of Onset   • No Known Problems Mother    • No Known Problems Father    • No Known Problems Maternal Grandmother    • No Known Problems Maternal Grandfather    • No Known  "Problems Paternal Grandmother    • No Known Problems Paternal Grandfather        Family History of Psych related illness/treatment: denies      PSYCHIATRIC TREATMENT HISTORY:  History of outpatient psych treatment: Denies                History of psych hospitalizations: Denies     History of prior treatment/psychotherpay/medication management: Denies    DEVELOPMENTAL HISTORY:  This patient was born in [place], she was raised by her mother mostly along with her younger sister.  Her father moved to the [place] from [place] when she was 5 years old, then she moved to [place] thereafter and discovered that her father had another family already.  She currently does not have a good relationship with her father.  She does have a stepfather who has been supportive.  She reports growing up was \"good.  \"       EDUCATIONAL:  Highest grade level completed: Patient completed high school and also college courses in criminal justice.  She aspires to perhaps be a .    SUBSTANCE USE/ADDICTION HISTORY:  Is there a family history of substance use/addiction? No    Does patient acknowledge use of/dependence on substances? no    Any other addictive behavior reported (gambling, shopping, sex)? No       Amphetamine: Denies   Cannabis: Denies   Cocaine: Denies   Ecstasy: Denies   Hallucinogens: Denies   Inhalants: Denies   Opiates: Denies   Sedatives: Denies   Other: Denies     ABUSE/NEGLECT:  Does patient report feeling “unsafe” in his/her home, or afraid of anyone? No     History of physical, sexual, or emotional abuse? No     Is there evidence of neglect by self? No     Is there evidence of neglect of children/baby? No     Does the patient report any history of CPS/APS/police involvement related to suspected abuse/neglect or domestic violence? No      LEGAL HISTORY:  Has the patient ever been involved with juvenile, adult, or family legal systems? No   Does patient report ever committing a crime? No   Does patient " report every being arrested? No   Does patient report ever being a victim of a crime? No   Does patient report involvement in any current legal issues? No        SAFETY ASSESSMENT - SELF:  Does patient acknowledge current or past symptoms of dangerousness to self? Denies SI       History of suicide by family member: no  History of suicide by friend/significant other: no    If the patient has endorsed SI:    Recent change in amount/specificity/intensity of suicidal thoughts or self-harm behavior? N/A   Recent change in amount/specificity/intensity of thoughts or threats to harm others/baby? N/A     Current access to firearms, medications, or other identified means of suicide/self-harm? no    ROS  Mood: Describes current mood as sad  Anxiety: Patient reports she sometimes has situational anxiety, she reports she actually feels better when she leaves the house.  She admits to feeling sad that her depressed most days.  She often spends her time making sure her baby is breathing.  Sleep: Patient reports she has restless sleep, she has trouble falling asleep during the day.  Her partner does not also help much per her report and so she does not have time to get much sleep.    Psychomotor/Energy:  Denies Psychomotor retardation or Chronic impulsivity  Eating/Appetite: Patient reports decreased appetite at times, she forgets to eat. Reports she has a good relationship with food.   Cognitive:  Denies distractibility, difficulty maintaining focus.   Psychosis:  Denies hallucinations, delusions, paranoia. Denies intrusive thoughts. Denies SI/HI. Denies thought of hurting her baby.   Social: Patient reports that she does have some support from her mother but her mother does work a lot.  She does have one friend that comes over to help her who is also her mother and she reports that helpful.  She does not have much support from her partner right now unfortunately. Denies avoiding social interactions or feeling socially  withdrawn.    No fever, no chest pain, no palpitations, no shortness of breath, no abdominal pain     All other systems reviewed and are negative.        Objective:     /80   Wt 96.2 kg (212 lb)  Body mass index is 37.55 kg/m².    Physical Exam:   Constitutional: Alert, no distress, good eye contact   Respiratory: Unlabored respiratory effort, speaking in full sentences.   Skin: Warm, dry, good turgor, no rashes in visible areas.  Psych: Alert and oriented x3, appropriate affect and mood.    Participation: Active verbal participation and attentive to visit    Grooming:   Clean and casual   Mood:  Patient describes their mood as sad   Affect: Appears congruent with mood at times, she does appear tearful at times but also preoccupied with her baby   Cognition: Cognitive function appears intact with no perceived deficits of short   term or long term memory.    Thought process: Wnl   Thought content: Wnl   Speech: Rate within normal limits and Volume within normal limits   Perception: Within normal limits   Insight:  Within normal limits   Judgment: Within normal limits   Family/couple interaction observations: Patient is very attentive to her baby burping him throughout visit and holding him in her lap.      Assessment and Plan:   The following treatment plan was discussed    [place]  Depression Scale  Score 2019: 16      1. Postpartum depression  Patient only has emergency Medicaid due to her legal status in the .  Unfortunately she likely will not be able to follow-up due to cost.    I have advised patient to seek out a primary care provider with access to healthcare.  It will be important for her to bridge the gap.    I have referred the patient to [facility], Alcira Colon LCSW.  Discussed with her a possible cash pay option, encourage patient to attend therapy.    Advised she may complete labs today if this is covered by her emergency Medicaid.    Patient may follow-up with  me as needed.  I did discuss a safety plan and provided several resources for patient to utilize outside of our office as well.  - TSH; Future  - VITAMIN D,25 HYDROXY; Future  - CBC WITH DIFFERENTIAL; Future  - IRON; Future    2. History of  delivery  Provided patient with information on ICA and support groups.    3. History of pregnancy induced hypertension  Again encouraged the importance of follow-up with her primary care provider.      Current Suicide/Homicide Risk: Low   Safety Plan completed/reviewed, information provided for appropriate contacts     Discussed with patient s/s to seek emergent care or to report to ER.     Reviewed risks and benefits of treatment plan. Patient verbally agrees to plan of care.    Total 45 minutes face-to-face time spent with patient, with greater than 50% of the total time discussing patient's issues and symptoms as listed above in assessment and plan, as well as managing coordination of care for future evaluation and treatment.       Followup: Return if symptoms worsen or fail to improve, for Follow up with PCP.    KOFFI Barcenas.     PLEASE NOTE: This dictation was created using voice recognition software. I have made every reasonable attempt to correct obvious errors, but I expect that there may be errors of grammar and possibly content that I did not discover prior finalizing this note.

## 2019-04-05 NOTE — ASSESSMENT & PLAN NOTE
"2019  Referred by: Haily Weir CNM      Pregnancy/Birth History: This was patient's first pregnancy, she now has a baby boy, 5 weeks old.  Patient reports that approximately 34 weeks she became preeclamptic, blood pressure was elevated and she had some swelling.  She was then induced, was in labor for 2 days but then was advised to complete a  due to lack of labor progress.  She reports she had a \"horrible \"experience in her , she felt like she could feel everything.  She reports since that time she is been healing well.  Her baby did spend 2 weeks in the NICU, she did comment that the nurses in the NICU were very supportive and helpful of her.    History of depression/anxiety: Patient denies any history of depression or anxiety.    Feeding: She is breast-feeding and also supplementing with bottle formula.    Partner/Social Support: Patient reports that her partner support is \"horrible.  \"She reports that her partner often works every day of the week, and he has not help much at home with her.  She also reports that he is not really supportive of her mental health, \"he just does not understand.  \"    History of Trauma: Denies any history of emotional, physical, sexual abuse.    Current living situation/household members: She lives at home with her partner and her new baby.    Denies history of thyroid disorder, anemia, vitamin D deficiency                 "

## 2019-10-18 ENCOUNTER — HOSPITAL ENCOUNTER (EMERGENCY)
Facility: MEDICAL CENTER | Age: 24
End: 2019-10-18
Attending: EMERGENCY MEDICINE
Payer: COMMERCIAL

## 2019-10-18 ENCOUNTER — APPOINTMENT (OUTPATIENT)
Dept: RADIOLOGY | Facility: MEDICAL CENTER | Age: 24
End: 2019-10-18
Attending: EMERGENCY MEDICINE
Payer: COMMERCIAL

## 2019-10-18 VITALS
HEART RATE: 79 BPM | DIASTOLIC BLOOD PRESSURE: 98 MMHG | BODY MASS INDEX: 37.23 KG/M2 | OXYGEN SATURATION: 97 % | WEIGHT: 210.1 LBS | HEIGHT: 63 IN | TEMPERATURE: 97.5 F | RESPIRATION RATE: 18 BRPM | SYSTOLIC BLOOD PRESSURE: 138 MMHG

## 2019-10-18 DIAGNOSIS — R51.9 ACUTE NONINTRACTABLE HEADACHE, UNSPECIFIED HEADACHE TYPE: ICD-10-CM

## 2019-10-18 DIAGNOSIS — Z30.432 ENCOUNTER FOR IUD REMOVAL: ICD-10-CM

## 2019-10-18 LAB — HCG UR QL: NEGATIVE

## 2019-10-18 PROCEDURE — 58301 REMOVE INTRAUTERINE DEVICE: CPT

## 2019-10-18 PROCEDURE — 81025 URINE PREGNANCY TEST: CPT

## 2019-10-18 PROCEDURE — 306637 HCHG MISC ORTHO ITEM RC 0274

## 2019-10-18 PROCEDURE — 99283 EMERGENCY DEPT VISIT LOW MDM: CPT

## 2019-10-18 RX ORDER — MORPHINE SULFATE 4 MG/ML
4 INJECTION, SOLUTION INTRAMUSCULAR; INTRAVENOUS ONCE
Status: DISCONTINUED | OUTPATIENT
Start: 2019-10-18 | End: 2019-10-18 | Stop reason: HOSPADM

## 2019-10-18 RX ORDER — ONDANSETRON 2 MG/ML
4 INJECTION INTRAMUSCULAR; INTRAVENOUS ONCE
Status: DISCONTINUED | OUTPATIENT
Start: 2019-10-18 | End: 2019-10-18 | Stop reason: HOSPADM

## 2019-10-18 NOTE — ED TRIAGE NOTES
Chief Complaint   Patient presents with   • Headache   Pt to triage in [facility].  Pt reports headache since 11 AM today.  Pt reports she had pre-eclampsia during pregnancy and child is 7 months old.  Pt additionally reports she has an IUD and takes a mini birth control pill.  Pt educated on triage process and instructed to notify triage RN of any change in status.

## 2019-10-18 NOTE — ED PROVIDER NOTES
ED Provider Note    CHIEF COMPLAINT  Chief Complaint   Patient presents with   • Headache       HPI  Komal Hall is a 24 y.o. female who presents for evaluation of a headache.  At 11 AM this morning it sounds like she had a rather acute onset of headache.  Seems to be bilateral in the occipital region.  She denies any head trauma or fevers.  She states that she does not routinely get headaches.  She did have associated nausea and vomiting as well as some blurriness to her vision.  She denies any weakness or numbness.    REVIEW OF SYSTEMS  See HPI for further details. All other systems negative.    PAST MEDICAL HISTORY  Past Medical History:   Diagnosis Date   • Gestational hypertension, third trimester 1/28/2019   • Postpartum depression 4/5/2019   • Preeclampsia        FAMILY HISTORY  Family History   Problem Relation Age of Onset   • No Known Problems Mother    • No Known Problems Father    • No Known Problems Maternal Grandmother    • No Known Problems Maternal Grandfather    • No Known Problems Paternal Grandmother    • No Known Problems Paternal Grandfather        SOCIAL HISTORY  Social History     Socioeconomic History   • Marital status: Single     Spouse name: Not on file   • Number of children: Not on file   • Years of education: Not on file   • Highest education level: Not on file   Occupational History   • Not on file   Social Needs   • Financial resource strain: Not on file   • Food insecurity:     Worry: Not on file     Inability: Not on file   • Transportation needs:     Medical: Not on file     Non-medical: Not on file   Tobacco Use   • Smoking status: Never Smoker   • Smokeless tobacco: Never Used   Substance and Sexual Activity   • Alcohol use: No   • Drug use: No   • Sexual activity: Yes     Partners: Male     Birth control/protection: Pill     Comment: was taking pill for bc   Lifestyle   • Physical activity:     Days per week: Not on file     Minutes per session: Not on file   •  "Stress: Not on file   Relationships   • Social connections:     Talks on phone: Not on file     Gets together: Not on file     Attends Synagogue service: Not on file     Active member of club or organization: Not on file     Attends meetings of clubs or organizations: Not on file     Relationship status: Not on file   • Intimate partner violence:     Fear of current or ex partner: Not on file     Emotionally abused: Not on file     Physically abused: Not on file     Forced sexual activity: Not on file   Other Topics Concern   • Not on file   Social History Narrative   • Not on file       SURGICAL HISTORY  Past Surgical History:   Procedure Laterality Date   • PRIMARY C SECTION  2/28/2019    Procedure: PRIMARY C SECTION;  Surgeon: Zulema Jones M.D.;  Location: LABOR AND DELIVERY;  Service: Obstetrics       CURRENT MEDICATIONS  Home Medications     Reviewed by Adeola Redding R.N. (Registered Nurse) on 10/18/19 at 1416  Med List Status: <None>   Medication Last Dose Status   norethindrone (MICRONOR) 0.35 MG tablet  Active   Prenatal MV-Min-Fe Fum-FA-DHA (PRENATAL 1 PO)  Active                ALLERGIES  No Known Allergies    PHYSICAL EXAM  VITAL SIGNS: /98   Pulse 79   Temp 36.4 °C (97.5 °F) (Temporal)   Resp 18   Ht 1.6 m (5' 3\")   Wt 95.3 kg (210 lb 1.6 oz)   SpO2 97%   BMI 37.22 kg/m²   Constitutional: Well developed, Well nourished, No acute distress, Non-toxic appearance.   HENT: Normocephalic, Atraumatic.  Eyes: PERRL, EOMI, Conjunctiva normal, No discharge.   Neck: Normal range of motion.   Cardiovascular: Normal heart rate.   Thorax & Lungs: No respiratory distress.   Skin: Warm, Dry.  Musculoskeletal: Good range of motion in all major joints.   Neurologic: Awake and alert, Normal motor function, Normal sensory function, No focal deficits noted.       Procedure note  Procedure: IUD removal  Indication: Headache thought to be secondary to excess hormones.  Patient is placed in the " lithotomy position with her nurse in attendance.  Speculum was advanced into the vagina.  Cervix is visualized and the IUD string is identified.  It is grasped using ring forceps and using traction the IUD is removed intact.  There is no bleeding.    COURSE & MEDICAL DECISION MAKING  Pertinent Labs & Imaging studies reviewed. (See chart for details)  Is a 24-year-old here for evaluation of a headache.  She had a headache that started at 11 AM this morning.  She describes it is quite severe.  She was neurologically intact.  I initially had ordered morphine and Zofran.  She refused the morphine because she is breast-feeding.  She has for something else for pain.  I was concerned about the possibility of an intracranial hemorrhage therefore nonsteroidals were not an option and she was offered Tylenol.  When they came to get her for her CT scan she then refused the CT scan.  I went and spoke with her about the situation and is at that point that the rest of the story became clear.  After delivery of her recent child an IUD was placed.  She was having breakthrough bleeding so she was placed on low-dose birth control pills.  She started having headaches after that.  She contacted her provider today and they were not in the office.  She contacted urgent care and they referred her to the emergency department for IUD removal.  I explained to the patient and her  that IUDs are not routinely removed in the emergency department as it is not an emergency procedure.  They were both very frustrated stating that they were just doing what they were told and they spent $400 to come in here thinking that she would get her IUD removed.  She states that they certainly could not afford $400.  After speaking with them I agreed to remove her IUD based on their circumstances.  IUD is removed per the procedure note above.  I explained to the patient that even though she is taking birth control pills she should not consider that  adequate birth control at this time.  She is to follow-up with her primary care provider.  She should return here for any worsening symptoms.  She is discharged into the care of her family at this time.    FINAL IMPRESSION  1.  Headache  2.  IUD removal  3.         Electronically signed by: Jose Ansari, 10/18/2019 2:58 PM

## 2019-10-18 NOTE — ED NOTES
Notified Dr. Ansari that pt does not want to take morphine because she is breast feeding and asking for alternative.    Pt wanting to have IUD removed. notified ERP. Pt to obtain referral information on discharge.

## 2019-10-19 NOTE — ED NOTES
Pt aox4, resp equal unlabored, moving all extremities, steady gait on ambulation. Given education on IUD removal. Pt notified that even thought she is on low dose birth control she should not consider this effective means of BC at this time. Pt to follow up with [facility] outpatient. Pt notified that if she cont to have headaches and acute worsening symptoms to return to ED.

## 2019-10-19 NOTE — DISCHARGE INSTRUCTIONS
I have removed your IUD today.  Even though you are taking a low-dose birth control pill you should not consider that an effective means of contraception at this time.  If you have worsening of your headache or not well for any reason you should return to the emergency department for reevaluation.

## 2019-10-19 NOTE — ED NOTES
Dr. Ansari at bedside to take pt IUD out w female RN supervision.    Pt c/o that her IUD is causing her headaches.

## 2020-06-19 NOTE — CARE PLAN
Problem: Altered physiologic condition related to postoperative  delivery  Goal: Patient physiologically stable as evidenced by normal lochia, palpable uterine involution and vital signs within normal limits  Outcome: PROGRESSING AS EXPECTED  VSS. Fundus firm. Lochia light.    Problem: Alteration in comfort related to surgical incision and/or after birth pains  Goal: Patient verbalizes acceptable pain level  Outcome: PROGRESSING AS EXPECTED  Pt pain managed with PRN pain meds per MAR.       daily

## 2020-07-06 ENCOUNTER — OFFICE VISIT (OUTPATIENT)
Dept: URGENT CARE | Facility: CLINIC | Age: 25
End: 2020-07-06
Payer: COMMERCIAL

## 2020-07-06 VITALS
WEIGHT: 198 LBS | TEMPERATURE: 97.1 F | HEIGHT: 63 IN | HEART RATE: 98 BPM | SYSTOLIC BLOOD PRESSURE: 116 MMHG | RESPIRATION RATE: 12 BRPM | OXYGEN SATURATION: 97 % | DIASTOLIC BLOOD PRESSURE: 78 MMHG | BODY MASS INDEX: 35.08 KG/M2

## 2020-07-06 DIAGNOSIS — L02.32 BOIL OF BUTTOCK: ICD-10-CM

## 2020-07-06 PROCEDURE — 10061 I&D ABSCESS COMP/MULTIPLE: CPT | Performed by: FAMILY MEDICINE

## 2020-07-06 RX ORDER — AMOXICILLIN AND CLAVULANATE POTASSIUM 875; 125 MG/1; MG/1
1 TABLET, FILM COATED ORAL 2 TIMES DAILY
Qty: 14 TAB | Refills: 0 | Status: SHIPPED | OUTPATIENT
Start: 2020-07-06 | End: 2020-07-13

## 2020-07-06 ASSESSMENT — FIBROSIS 4 INDEX: FIB4 SCORE: 0.36

## 2020-07-06 NOTE — PROGRESS NOTES
"Subjective:      Komal Hall is a 24 y.o. female who presents with Cyst (x 4 days.  Pt. states she has a pilinial cyst on her buttocks. )      - This is a pleasant and non toxic appearing 24 y.o. female with c/o infection on bottom x 4 days. Says had previous cyst here drained by surgeon about 1 yr ago. No NVFC            ALLERGIES:  Patient has no known allergies.     PMH:  Past Medical History:   Diagnosis Date   • Gestational hypertension, third trimester 1/28/2019   • Postpartum depression 4/5/2019   • Preeclampsia         PSH:  Past Surgical History:   Procedure Laterality Date   • PRIMARY C SECTION  2/28/2019    Procedure: PRIMARY C SECTION;  Surgeon: Zulema Jones M.D.;  Location: LABOR AND DELIVERY;  Service: Obstetrics       MEDS:    Current Outpatient Medications:   •  amoxicillin-clavulanate (AUGMENTIN) 875-125 MG Tab, Take 1 Tab by mouth 2 times a day for 7 days., Disp: 14 Tab, Rfl: 0  •  norethindrone (MICRONOR) 0.35 MG tablet, Take 1 Tab by mouth every day., Disp: 84 Tab, Rfl: 4  •  Prenatal MV-Min-Fe Fum-FA-DHA (PRENATAL 1 PO), Take  by mouth., Disp: , Rfl:     ** I have documented what I find to be significant in regards to past medical, social, family and surgical history  in my HPI or under PMH/PSH/FH review section, otherwise it is contributory **           HPI    Review of Systems   Skin:        Butt cyst   All other systems reviewed and are negative.         Objective:     /78   Pulse 98   Temp 36.2 °C (97.1 °F) (Temporal)   Resp 12   Ht 1.6 m (5' 3\")   Wt 89.8 kg (198 lb)   LMP 06/27/2020   SpO2 97%   BMI 35.07 kg/m²      Physical Exam  Vitals signs and nursing note reviewed.   Constitutional:       General: She is not in acute distress.     Appearance: She is well-developed. She is not diaphoretic.   HENT:      Head: Normocephalic.   Cardiovascular:      Heart sounds: Normal heart sounds. No murmur.   Pulmonary:      Effort: Pulmonary effort is normal. No " "respiratory distress.   Skin:     Coloration: Skin is not pale.      Findings: No rash.          Neurological:      Mental Status: She is alert.      Motor: No abnormal muscle tone.   Psychiatric:         Mood and Affect: Mood normal.         Behavior: Behavior normal.         Judgment: Judgment normal.                 Assessment/Plan:           1. Boil of buttock  REFERRAL TO GENERAL SURGERY    amoxicillin-clavulanate (AUGMENTIN) 875-125 MG Tab       Procedure: Incision and Drainage  -Risks, benefits, and alternatives discussed. Risks including infection, bleeding, nerve damage, and poor cosmetic outcome  -Sterile technique throughout  -Local anesthesia with 2% lidocaine with epinephrine  -Incision with #11 blade into fluctuant area with purulent material expressed  -Cavity probed and any loculations bluntly taken down with hemostat  -Irrigated copiously with NS  -Packed with 1/4\" gauze  -Minimal bleeding with good hemostasis achieved  -The patient tolerated the procedure well  - I&D care discussed, 2 day recheck advised                "

## 2020-07-08 ENCOUNTER — OFFICE VISIT (OUTPATIENT)
Dept: URGENT CARE | Facility: CLINIC | Age: 25
End: 2020-07-08

## 2020-07-08 VITALS
WEIGHT: 198 LBS | HEART RATE: 87 BPM | DIASTOLIC BLOOD PRESSURE: 78 MMHG | TEMPERATURE: 97.4 F | BODY MASS INDEX: 35.08 KG/M2 | HEIGHT: 63 IN | RESPIRATION RATE: 16 BRPM | OXYGEN SATURATION: 97 % | SYSTOLIC BLOOD PRESSURE: 110 MMHG

## 2020-07-08 DIAGNOSIS — L05.91 PILONIDAL CYST: ICD-10-CM

## 2020-07-08 PROCEDURE — 99024 POSTOP FOLLOW-UP VISIT: CPT | Performed by: PHYSICIAN ASSISTANT

## 2020-07-08 ASSESSMENT — ENCOUNTER SYMPTOMS
FEVER: 0
SHORTNESS OF BREATH: 0
VOMITING: 0
TINGLING: 0
NAUSEA: 0
CHILLS: 0
SENSORY CHANGE: 0
FOCAL WEAKNESS: 0
MYALGIAS: 0
COUGH: 0

## 2020-07-08 ASSESSMENT — FIBROSIS 4 INDEX: FIB4 SCORE: 0.36

## 2020-07-08 NOTE — PROGRESS NOTES
"Subjective:      Komal Hall is a 24 y.o. female who presents with Abscess (cyst on the back, getting a little better, the packing came off and it was bleending but ok now x monday )            Wound Check   Treated in ED: 2 days  ago. Previous treatment included I&D of abscess (I&D pilonidal cyst). There has been clear and bloody discharge from the wound. The redness has improved. The swelling has improved. The pain has improved. She has no difficulty moving the affected extremity or digit.     Packing fell out about 2 days ago.    Past Medical History:   Diagnosis Date   • Gestational hypertension, third trimester 1/28/2019   • Postpartum depression 4/5/2019   • Preeclampsia        Past Surgical History:   Procedure Laterality Date   • PRIMARY C SECTION  2/28/2019    Procedure: PRIMARY C SECTION;  Surgeon: Zulema Jones M.D.;  Location: LABOR AND DELIVERY;  Service: Obstetrics       Family History   Problem Relation Age of Onset   • No Known Problems Mother    • No Known Problems Father    • No Known Problems Maternal Grandmother    • No Known Problems Maternal Grandfather    • No Known Problems Paternal Grandmother    • No Known Problems Paternal Grandfather        No Known Allergies    Medications, Allergies, and current problem list reviewed today in Epic      Review of Systems   Constitutional: Negative for chills, fever and malaise/fatigue.   Respiratory: Negative for cough and shortness of breath.    Gastrointestinal: Negative for nausea and vomiting.   Musculoskeletal: Negative for myalgias.   Skin:        Abscess to left buttock region   Neurological: Negative for tingling, sensory change and focal weakness.     All other systems reviewed and are negative.        Objective:     /78   Pulse 87   Temp 36.3 °C (97.4 °F) (Temporal)   Resp 16   Ht 1.6 m (5' 3\")   Wt 89.8 kg (198 lb)   LMP 06/27/2020   SpO2 97%   BMI 35.07 kg/m²      Physical Exam  Constitutional:    " General: She is not in acute distress.  HENT:      Head: Normocephalic and atraumatic.   Eyes:      Conjunctiva/sclera: Conjunctivae normal.   Cardiovascular:      Rate and Rhythm: Normal rate.   Pulmonary:      Effort: Pulmonary effort is normal. No respiratory distress.   Musculoskeletal: Normal range of motion.   Skin:     General: Skin is warm.          Neurological:      General: No focal deficit present.      Mental Status: She is alert and oriented to person, place, and time.   Psychiatric:         Mood and Affect: Mood normal.         Behavior: Behavior normal.         Thought Content: Thought content normal.         Judgment: Judgment normal.                 Assessment/Plan:       1. Pilonidal cyst  Continue antibiotics  Wound dressed.  Follow-up with surgeon. Wound actively draining and pretty much closed. I do not feel repeat I&D is warranted at this time.  RTC if any worsening symptoms.    Differential diagnoses, Supportive care, and indications for immediate follow-up discussed with patient.   Pathogenesis of diagnosis discussed including typical length and natural progression.   Instructed to return to clinic or nearest emergency department for any change in condition, further concerns, or worsening of symptoms.    The patient demonstrated a good understanding and agreed with the treatment plan.    Carri Jarquin P.A.-C.

## 2020-07-16 ENCOUNTER — APPOINTMENT (OUTPATIENT)
Dept: URGENT CARE | Facility: CLINIC | Age: 25
End: 2020-07-16
Payer: COMMERCIAL

## 2020-08-19 DIAGNOSIS — Z01.812 PRE-PROCEDURAL LABORATORY EXAMINATION: ICD-10-CM

## 2020-08-19 LAB
COVID ORDER STATUS COVID19: NORMAL
HCG SERPL QL: NEGATIVE
SARS-COV-2 RNA RESP QL NAA+PROBE: NOTDETECTED
SPECIMEN SOURCE: NORMAL

## 2020-08-19 PROCEDURE — 36415 COLL VENOUS BLD VENIPUNCTURE: CPT

## 2020-08-19 PROCEDURE — U0003 INFECTIOUS AGENT DETECTION BY NUCLEIC ACID (DNA OR RNA); SEVERE ACUTE RESPIRATORY SYNDROME CORONAVIRUS 2 (SARS-COV-2) (CORONAVIRUS DISEASE [COVID-19]), AMPLIFIED PROBE TECHNIQUE, MAKING USE OF HIGH THROUGHPUT TECHNOLOGIES AS DESCRIBED BY CMS-2020-01-R: HCPCS

## 2020-08-19 PROCEDURE — 84703 CHORIONIC GONADOTROPIN ASSAY: CPT

## 2020-08-19 ASSESSMENT — FIBROSIS 4 INDEX: FIB4 SCORE: 0.38

## 2020-08-22 ENCOUNTER — ANESTHESIA (OUTPATIENT)
Dept: SURGERY | Facility: MEDICAL CENTER | Age: 25
End: 2020-08-22
Payer: COMMERCIAL

## 2020-08-22 ENCOUNTER — ANESTHESIA EVENT (OUTPATIENT)
Dept: SURGERY | Facility: MEDICAL CENTER | Age: 25
End: 2020-08-22
Payer: COMMERCIAL

## 2020-08-22 ENCOUNTER — HOSPITAL ENCOUNTER (OUTPATIENT)
Facility: MEDICAL CENTER | Age: 25
End: 2020-08-22
Attending: SURGERY | Admitting: SURGERY
Payer: COMMERCIAL

## 2020-08-22 VITALS
HEART RATE: 85 BPM | SYSTOLIC BLOOD PRESSURE: 104 MMHG | RESPIRATION RATE: 16 BRPM | DIASTOLIC BLOOD PRESSURE: 62 MMHG | OXYGEN SATURATION: 95 % | WEIGHT: 194.67 LBS | TEMPERATURE: 98 F | BODY MASS INDEX: 34.49 KG/M2 | HEIGHT: 63 IN

## 2020-08-22 DIAGNOSIS — L05.91 PILONIDAL CYST: ICD-10-CM

## 2020-08-22 LAB
GRAM STN SPEC: NORMAL
HCG UR QL: NEGATIVE
SIGNIFICANT IND 70042: NORMAL
SITE SITE: NORMAL
SOURCE SOURCE: NORMAL

## 2020-08-22 PROCEDURE — 160027 HCHG SURGERY MINUTES - 1ST 30 MINS LEVEL 2: Performed by: SURGERY

## 2020-08-22 PROCEDURE — 87070 CULTURE OTHR SPECIMN AEROBIC: CPT

## 2020-08-22 PROCEDURE — 87075 CULTR BACTERIA EXCEPT BLOOD: CPT

## 2020-08-22 PROCEDURE — 700111 HCHG RX REV CODE 636 W/ 250 OVERRIDE (IP): Performed by: ANESTHESIOLOGY

## 2020-08-22 PROCEDURE — 160046 HCHG PACU - 1ST 60 MINS PHASE II: Performed by: SURGERY

## 2020-08-22 PROCEDURE — 700102 HCHG RX REV CODE 250 W/ 637 OVERRIDE(OP): Performed by: SURGERY

## 2020-08-22 PROCEDURE — A9270 NON-COVERED ITEM OR SERVICE: HCPCS | Performed by: ANESTHESIOLOGY

## 2020-08-22 PROCEDURE — 160035 HCHG PACU - 1ST 60 MINS PHASE I: Performed by: SURGERY

## 2020-08-22 PROCEDURE — 700102 HCHG RX REV CODE 250 W/ 637 OVERRIDE(OP): Performed by: ANESTHESIOLOGY

## 2020-08-22 PROCEDURE — A6266 IMPREG GAUZE NO H20/SAL/YARD: HCPCS | Performed by: SURGERY

## 2020-08-22 PROCEDURE — 81025 URINE PREGNANCY TEST: CPT

## 2020-08-22 PROCEDURE — 160009 HCHG ANES TIME/MIN: Performed by: SURGERY

## 2020-08-22 PROCEDURE — 160002 HCHG RECOVERY MINUTES (STAT): Performed by: SURGERY

## 2020-08-22 PROCEDURE — 501838 HCHG SUTURE GENERAL: Performed by: SURGERY

## 2020-08-22 PROCEDURE — 160038 HCHG SURGERY MINUTES - EA ADDL 1 MIN LEVEL 2: Performed by: SURGERY

## 2020-08-22 PROCEDURE — 160048 HCHG OR STATISTICAL LEVEL 1-5: Performed by: SURGERY

## 2020-08-22 PROCEDURE — 700101 HCHG RX REV CODE 250: Performed by: SURGERY

## 2020-08-22 PROCEDURE — 160025 RECOVERY II MINUTES (STATS): Performed by: SURGERY

## 2020-08-22 PROCEDURE — 700105 HCHG RX REV CODE 258: Performed by: ANESTHESIOLOGY

## 2020-08-22 PROCEDURE — 700105 HCHG RX REV CODE 258: Performed by: SURGERY

## 2020-08-22 PROCEDURE — 501445 HCHG STAPLER, SKIN DISP: Performed by: SURGERY

## 2020-08-22 PROCEDURE — 87076 CULTURE ANAEROBE IDENT EACH: CPT

## 2020-08-22 PROCEDURE — 87205 SMEAR GRAM STAIN: CPT

## 2020-08-22 PROCEDURE — 700101 HCHG RX REV CODE 250: Performed by: ANESTHESIOLOGY

## 2020-08-22 PROCEDURE — A9270 NON-COVERED ITEM OR SERVICE: HCPCS | Performed by: SURGERY

## 2020-08-22 RX ORDER — HYDROMORPHONE HYDROCHLORIDE 1 MG/ML
0.1 INJECTION, SOLUTION INTRAMUSCULAR; INTRAVENOUS; SUBCUTANEOUS
Status: DISCONTINUED | OUTPATIENT
Start: 2020-08-22 | End: 2020-08-22 | Stop reason: HOSPADM

## 2020-08-22 RX ORDER — OXYCODONE HCL 5 MG/5 ML
10 SOLUTION, ORAL ORAL
Status: COMPLETED | OUTPATIENT
Start: 2020-08-22 | End: 2020-08-22

## 2020-08-22 RX ORDER — SODIUM CHLORIDE, SODIUM LACTATE, POTASSIUM CHLORIDE, CALCIUM CHLORIDE 600; 310; 30; 20 MG/100ML; MG/100ML; MG/100ML; MG/100ML
INJECTION, SOLUTION INTRAVENOUS
Status: DISCONTINUED | OUTPATIENT
Start: 2020-08-22 | End: 2020-08-22 | Stop reason: SURG

## 2020-08-22 RX ORDER — HYDRALAZINE HYDROCHLORIDE 20 MG/ML
5 INJECTION INTRAMUSCULAR; INTRAVENOUS
Status: DISCONTINUED | OUTPATIENT
Start: 2020-08-22 | End: 2020-08-22 | Stop reason: HOSPADM

## 2020-08-22 RX ORDER — SODIUM CHLORIDE, SODIUM LACTATE, POTASSIUM CHLORIDE, CALCIUM CHLORIDE 600; 310; 30; 20 MG/100ML; MG/100ML; MG/100ML; MG/100ML
INJECTION, SOLUTION INTRAVENOUS CONTINUOUS
Status: DISCONTINUED | OUTPATIENT
Start: 2020-08-22 | End: 2020-08-22 | Stop reason: HOSPADM

## 2020-08-22 RX ORDER — ROCURONIUM BROMIDE 10 MG/ML
INJECTION, SOLUTION INTRAVENOUS PRN
Status: DISCONTINUED | OUTPATIENT
Start: 2020-08-22 | End: 2020-08-22 | Stop reason: SURG

## 2020-08-22 RX ORDER — HYDROMORPHONE HYDROCHLORIDE 1 MG/ML
0.4 INJECTION, SOLUTION INTRAMUSCULAR; INTRAVENOUS; SUBCUTANEOUS
Status: DISCONTINUED | OUTPATIENT
Start: 2020-08-22 | End: 2020-08-22 | Stop reason: HOSPADM

## 2020-08-22 RX ORDER — SODIUM CHLORIDE, SODIUM LACTATE, POTASSIUM CHLORIDE, CALCIUM CHLORIDE 600; 310; 30; 20 MG/100ML; MG/100ML; MG/100ML; MG/100ML
INJECTION, SOLUTION INTRAVENOUS CONTINUOUS
Status: DISCONTINUED | OUTPATIENT
Start: 2020-08-22 | End: 2020-08-22

## 2020-08-22 RX ORDER — HALOPERIDOL 5 MG/ML
1 INJECTION INTRAMUSCULAR
Status: DISCONTINUED | OUTPATIENT
Start: 2020-08-22 | End: 2020-08-22 | Stop reason: HOSPADM

## 2020-08-22 RX ORDER — ONDANSETRON 2 MG/ML
INJECTION INTRAMUSCULAR; INTRAVENOUS PRN
Status: DISCONTINUED | OUTPATIENT
Start: 2020-08-22 | End: 2020-08-22 | Stop reason: SURG

## 2020-08-22 RX ORDER — HYDROCODONE BITARTRATE AND ACETAMINOPHEN 5; 325 MG/1; MG/1
1 TABLET ORAL EVERY 6 HOURS PRN
Qty: 20 TAB | Refills: 0 | Status: SHIPPED | OUTPATIENT
Start: 2020-08-22 | End: 2020-08-26

## 2020-08-22 RX ORDER — BUPIVACAINE HYDROCHLORIDE AND EPINEPHRINE 5; 5 MG/ML; UG/ML
INJECTION, SOLUTION EPIDURAL; INTRACAUDAL; PERINEURAL
Status: DISCONTINUED | OUTPATIENT
Start: 2020-08-22 | End: 2020-08-22 | Stop reason: HOSPADM

## 2020-08-22 RX ORDER — HYDROCODONE BITARTRATE AND ACETAMINOPHEN 5; 325 MG/1; MG/1
1 TABLET ORAL EVERY 6 HOURS PRN
Qty: 12 TAB | Refills: 0 | Status: SHIPPED | OUTPATIENT
Start: 2020-08-22 | End: 2020-08-25

## 2020-08-22 RX ORDER — AMOXICILLIN AND CLAVULANATE POTASSIUM 875; 125 MG/1; MG/1
1 TABLET, FILM COATED ORAL 2 TIMES DAILY
Qty: 20 TAB | Refills: 0 | Status: SHIPPED | OUTPATIENT
Start: 2020-08-22 | End: 2020-09-01

## 2020-08-22 RX ORDER — DIPHENHYDRAMINE HYDROCHLORIDE 50 MG/ML
12.5 INJECTION INTRAMUSCULAR; INTRAVENOUS
Status: DISCONTINUED | OUTPATIENT
Start: 2020-08-22 | End: 2020-08-22 | Stop reason: HOSPADM

## 2020-08-22 RX ORDER — HYDROMORPHONE HYDROCHLORIDE 1 MG/ML
0.2 INJECTION, SOLUTION INTRAMUSCULAR; INTRAVENOUS; SUBCUTANEOUS
Status: DISCONTINUED | OUTPATIENT
Start: 2020-08-22 | End: 2020-08-22 | Stop reason: HOSPADM

## 2020-08-22 RX ORDER — MIDAZOLAM HYDROCHLORIDE 1 MG/ML
1 INJECTION INTRAMUSCULAR; INTRAVENOUS
Status: DISCONTINUED | OUTPATIENT
Start: 2020-08-22 | End: 2020-08-22 | Stop reason: HOSPADM

## 2020-08-22 RX ORDER — MEPERIDINE HYDROCHLORIDE 25 MG/ML
12.5 INJECTION INTRAMUSCULAR; INTRAVENOUS; SUBCUTANEOUS
Status: DISCONTINUED | OUTPATIENT
Start: 2020-08-22 | End: 2020-08-22 | Stop reason: HOSPADM

## 2020-08-22 RX ORDER — CEFAZOLIN SODIUM 1 G/3ML
INJECTION, POWDER, FOR SOLUTION INTRAMUSCULAR; INTRAVENOUS PRN
Status: DISCONTINUED | OUTPATIENT
Start: 2020-08-22 | End: 2020-08-22 | Stop reason: SURG

## 2020-08-22 RX ORDER — METOPROLOL TARTRATE 1 MG/ML
1 INJECTION, SOLUTION INTRAVENOUS
Status: DISCONTINUED | OUTPATIENT
Start: 2020-08-22 | End: 2020-08-22 | Stop reason: HOSPADM

## 2020-08-22 RX ORDER — LIDOCAINE HYDROCHLORIDE 20 MG/ML
INJECTION, SOLUTION EPIDURAL; INFILTRATION; INTRACAUDAL; PERINEURAL PRN
Status: DISCONTINUED | OUTPATIENT
Start: 2020-08-22 | End: 2020-08-22 | Stop reason: SURG

## 2020-08-22 RX ORDER — OXYCODONE HCL 5 MG/5 ML
5 SOLUTION, ORAL ORAL
Status: COMPLETED | OUTPATIENT
Start: 2020-08-22 | End: 2020-08-22

## 2020-08-22 RX ORDER — DEXAMETHASONE SODIUM PHOSPHATE 4 MG/ML
INJECTION, SOLUTION INTRA-ARTICULAR; INTRALESIONAL; INTRAMUSCULAR; INTRAVENOUS; SOFT TISSUE PRN
Status: DISCONTINUED | OUTPATIENT
Start: 2020-08-22 | End: 2020-08-22 | Stop reason: SURG

## 2020-08-22 RX ORDER — ONDANSETRON 2 MG/ML
4 INJECTION INTRAMUSCULAR; INTRAVENOUS
Status: DISCONTINUED | OUTPATIENT
Start: 2020-08-22 | End: 2020-08-22 | Stop reason: HOSPADM

## 2020-08-22 RX ORDER — LABETALOL HYDROCHLORIDE 5 MG/ML
5 INJECTION, SOLUTION INTRAVENOUS
Status: DISCONTINUED | OUTPATIENT
Start: 2020-08-22 | End: 2020-08-22 | Stop reason: HOSPADM

## 2020-08-22 RX ORDER — SUCCINYLCHOLINE/SOD CL,ISO/PF 200MG/10ML
SYRINGE (ML) INTRAVENOUS PRN
Status: DISCONTINUED | OUTPATIENT
Start: 2020-08-22 | End: 2020-08-22 | Stop reason: SURG

## 2020-08-22 RX ADMIN — FENTANYL CITRATE 150 MCG: 50 INJECTION INTRAMUSCULAR; INTRAVENOUS at 12:24

## 2020-08-22 RX ADMIN — ROCURONIUM BROMIDE 50 MG: 10 INJECTION, SOLUTION INTRAVENOUS at 12:24

## 2020-08-22 RX ADMIN — POVIDONE-IODINE 15 ML: 10 SOLUTION TOPICAL at 11:15

## 2020-08-22 RX ADMIN — Medication 200 MG: at 12:24

## 2020-08-22 RX ADMIN — SODIUM CHLORIDE, POTASSIUM CHLORIDE, SODIUM LACTATE AND CALCIUM CHLORIDE: 600; 310; 30; 20 INJECTION, SOLUTION INTRAVENOUS at 11:26

## 2020-08-22 RX ADMIN — LIDOCAINE HYDROCHLORIDE 100 MG: 20 INJECTION, SOLUTION EPIDURAL; INFILTRATION; INTRACAUDAL at 12:24

## 2020-08-22 RX ADMIN — SUGAMMADEX 200 MG: 100 INJECTION, SOLUTION INTRAVENOUS at 12:50

## 2020-08-22 RX ADMIN — FENTANYL CITRATE 25 MCG: 50 INJECTION INTRAMUSCULAR; INTRAVENOUS at 13:25

## 2020-08-22 RX ADMIN — CEFAZOLIN 2 G: 330 INJECTION, POWDER, FOR SOLUTION INTRAMUSCULAR; INTRAVENOUS at 12:24

## 2020-08-22 RX ADMIN — DEXAMETHASONE SODIUM PHOSPHATE 8 MG: 4 INJECTION, SOLUTION INTRA-ARTICULAR; INTRALESIONAL; INTRAMUSCULAR; INTRAVENOUS; SOFT TISSUE at 12:24

## 2020-08-22 RX ADMIN — FENTANYL CITRATE 25 MCG: 50 INJECTION INTRAMUSCULAR; INTRAVENOUS at 13:21

## 2020-08-22 RX ADMIN — ONDANSETRON 4 MG: 2 INJECTION INTRAMUSCULAR; INTRAVENOUS at 12:50

## 2020-08-22 RX ADMIN — PROPOFOL 200 MG: 10 INJECTION, EMULSION INTRAVENOUS at 12:24

## 2020-08-22 RX ADMIN — LIDOCAINE HYDROCHLORIDE 0.5 ML: 10 INJECTION, SOLUTION EPIDURAL; INFILTRATION; INTRACAUDAL at 11:16

## 2020-08-22 RX ADMIN — SODIUM CHLORIDE, POTASSIUM CHLORIDE, SODIUM LACTATE AND CALCIUM CHLORIDE: 600; 310; 30; 20 INJECTION, SOLUTION INTRAVENOUS at 12:20

## 2020-08-22 RX ADMIN — OXYCODONE HYDROCHLORIDE 10 MG: 5 SOLUTION ORAL at 13:17

## 2020-08-22 ASSESSMENT — FIBROSIS 4 INDEX: FIB4 SCORE: 0.38

## 2020-08-22 ASSESSMENT — PAIN SCALES - GENERAL: PAIN_LEVEL: 2

## 2020-08-22 NOTE — OP REPORT
DATE OF SERVICE:  08/22/2020    SURGEON:  Carmella Schmitt MD    ANESTHESIOLOGIST:  Jose Armando Ramsey DO    TYPE OF ANESTHESIA:  General anesthesia.    PREOPERATIVE DIAGNOSIS:  Infected pilonidal cyst.    POSTOPERATIVE DIAGNOSIS:  Infected pilonidal cyst.    PROCEDURE:  Incision, drainage, and debridement of the infected pilonidal   cyst.    INDICATION FOR PROCEDURE:  This is a pleasant lady who was referred to see me   for chronic pilonidal cyst.  Discussion was made with patient.  She would like   to undergo excision of the pilonidal cyst.  On the day of the surgery, she   presented with the pilonidal cyst being infected.  Discussion was made with   the patient and her .  They would like to proceed.  They understood   that I would not be able to close the wound due to the cyst being actively   infected.    PROCEDURE:  Informed consent was obtained.  The patient was taken to the   operating room and was placed in the supine position.  She was given Ancef   intravenously.  General anesthesia was induced.  The patient was placed on the   prone position with care taken to alleviate the pressure points.  Next, the   areas over and surrounding the pilonidal cyst was sterilely prepped and draped   in the normal fashion.  A time-out procedure was done.  An incision was made   on the left of the midline using a scalpel.  Purulent drainage was seen,   which was swabbed and sent for culture and sensitivity.  The devitalized,   infected subcutaneous tissue was sharply debrided using the Metzenbaum   scissor.  The wound was copiously irrigated.  No further obvious infection was   seen.  No hair pit was seen.  Hemostasis was achieved with the use of the electrocautery.  The wound   was packed with Nu Gauze and covered with a 4x4.    ESTIMATED BLOOD LOSS:  5 mL.    Sponge and instrument count was correct x2.    The patient was then placed in supine position, extubated, taken to recovery   area in stable condition.    I  contacted her  postoperatively and informed him of the intraoperative   findings and what was done.  I also instructed him on local wound care at   home.       ____________________________________     MD THOMAS Melendrez / SMITA    DD:  08/22/2020 13:17:20  DT:  08/22/2020 13:40:54    D#:  [id number]  Job#:  [id number]    cc: Jose Armando Ramsey DO

## 2020-08-22 NOTE — ANESTHESIA POSTPROCEDURE EVALUATION
Patient: Komal Hall    Procedure Summary     Date: 08/22/20 Room / Location: [place] [address] / SURGERY [facility]    Anesthesia Start: 1220 Anesthesia Stop: 1303    Procedure: EXCISION, PILONIDAL CYST (Buttocks) Diagnosis: (PILONIDAL CYST WITH RECURRENT INFECTION)    Surgeon: Carmella Schmitt M.D. Responsible Provider: Jose Armando Ramsey D.O.    Anesthesia Type: general ASA Status: 2          Final Anesthesia Type: general  Last vitals  BP   Blood Pressure: 104/62    Temp   36.7 °C (98 °F)    Pulse   Pulse: 85   Resp   16    SpO2   95 %      Anesthesia Post Evaluation    Patient location during evaluation: PACU  Patient participation: complete - patient participated  Level of consciousness: awake and alert  Pain score: 2    Airway patency: patent  Anesthetic complications: no  Cardiovascular status: hemodynamically stable  Respiratory status: acceptable  Hydration status: euvolemic    PONV: none           Nurse Pain Score: 2 (NPRS)

## 2020-08-22 NOTE — OR SURGEON
Immediate Post OP Note    PreOp Diagnosis: Infected pilonidal cust.    PostOp Diagnosis: Same.    Procedure(s):  INCISION, DRAINAGE, AND DEBRIDEMENT OF PILONIDAL CYST - Wound Class: Dirty or Infected    Surgeon(s):  Carmella Schmitt M.D.    Anesthesiologist/Type of Anesthesia:  Anesthesiologist: Jose Armando Ramsey D.O./General    Surgical Staff:  Circulator: Zayra Hoffmann R.N.  Relief Circulator: Paty Degroot R.N.  Scrub Person: Misael Mullins    Specimens removed if any:  ID Type Source Tests Collected by Time Destination   1 : pilonidal cyst Other Other AEROBIC/ANAEROBIC CULTURE (SURGERY) Carmella Schmitt M.D. 8/22/2020 12:49 PM        Estimated Blood Loss: 5ML.    Findings: Purulent material, sent for C&S.    Complications: None.    Dictated, #[id number].        8/22/2020 1:11 PM Carmella Schmitt M.D.

## 2020-08-22 NOTE — ANESTHESIA PROCEDURE NOTES
Airway    Date/Time: 8/22/2020 12:24 PM  Performed by: Jose Armando Ramsey D.O.  Authorized by: Jose Armando Ramsey D.O.     Location:  OR  Urgency:  Elective  Indications for Airway Management:  Anesthesia      Spontaneous Ventilation: absent    Sedation Level:  Deep  Preoxygenated: Yes    Patient Position:  Sniffing  Mask Difficulty Assessment:  0 - not attempted  Final Airway Type:  Endotracheal airway  Final Endotracheal Airway:  ETT  Cuffed: Yes    Technique Used for Successful ETT Placement:  Direct laryngoscopy    Insertion Site:  Oral  Blade Type:  Pan  Laryngoscope Blade/Videolaryngoscope Blade Size:  3  ETT Size (mm):  7.0  Measured from:  Teeth  ETT to Teeth (cm):  22  Placement Verified by: auscultation and capnometry    Cormack-Lehane Classification:  Grade I - full view of glottis  Number of Attempts at Approach:  1

## 2020-08-22 NOTE — ANESTHESIA TIME REPORT
Anesthesia Start and Stop Event Times     Date Time Event    8/22/2020 1213 Ready for Procedure     1220 Anesthesia Start     1303 Anesthesia Stop        Responsible Staff  08/22/20    Name Role Begin End    Jose Armando Ramsey D.O. Anesth 1220 1303        Preop Diagnosis (Free Text):  Pre-op Diagnosis     PILONIDAL CYST WITH RECURRENT INFECTION        Preop Diagnosis (Codes):    Post op Diagnosis  Pilonidal cyst      Premium Reason  B. 1st Call    Comments:

## 2020-08-22 NOTE — DISCHARGE INSTRUCTIONS
ACTIVITY: Rest and take it easy for the first 24 hours.  A responsible adult is recommended to remain with you during that time.  It is normal to feel sleepy.  We encourage you to not do anything that requires balance, judgment or coordination.    MILD FLU-LIKE SYMPTOMS ARE NORMAL. YOU MAY EXPERIENCE GENERALIZED MUSCLE ACHES, THROAT IRRITATION, HEADACHE AND/OR SOME NAUSEA.    FOR 24 HOURS DO NOT:  Drive, operate machinery or run household appliances.  Drink beer or alcoholic beverages.   Make important decisions or sign legal documents.    SPECIAL INSTRUCTIONS:   Take Augmentin as prescribed.  May take regular Tylenol as needed for mild pain.  For moderate pain, take Norco as prescribed.  2)  Remove packing, wash the area with soap and water and repack wound twice a day using Nugauze and cover with 4x4 gauzes.    3) Follow up with Dr. Schmitt on Tuesday September 1.  Call [phone] for appointment and for any problem.    DIET: To avoid nausea, slowly advance diet as tolerated, avoiding spicy or greasy foods for the first day.  Add more substantial food to your diet according to your physician's instructions.  Babies can be fed formula or breast milk as soon as they are hungry.  INCREASE FLUIDS AND FIBER TO AVOID CONSTIPATION.    SURGICAL DRESSING/BATHING: See above    FOLLOW-UP APPOINTMENT:  A follow-up appointment should be arranged with your doctor on September 1st ; call to schedule.    You should CALL YOUR PHYSICIAN if you develop:  Fever greater than 101 degrees F.  Pain not relieved by medication, or persistent nausea or vomiting.  Excessive bleeding (blood soaking through dressing) or unexpected drainage from the wound.  Extreme redness or swelling around the incision site, drainage of pus or foul smelling drainage.  Inability to urinate or empty your bladder within 8 hours.  Problems with breathing or chest pain.    You should call [phone] if you develop problems with breathing or chest pain.  If you are unable  to contact your doctor or surgical center, you should go to the nearest emergency room or urgent care center.  Physician's telephone #: [phone]    If any questions arise, call your doctor.  If your doctor is not available, please feel free to call the Surgical Center at [phone].  The Center is open Monday through Friday from 7AM to 7PM.  You can also call the HEALTH HOTLINE open 24 hours/day, 7 days/week and speak to a nurse at [phone], or toll free at [phone].    A registered nurse may call you a few days after your surgery to see how you are doing after your procedure.    MEDICATIONS: Resume taking daily medication.  Take prescribed pain medication with food.  If no medication is prescribed, you may take non-aspirin pain medication if needed.  PAIN MEDICATION CAN BE VERY CONSTIPATING.  Take a stool softener or laxative such as senokot, pericolace, or milk of magnesia if needed.    Prescription given for [place].     If your physician has prescribed pain medication that includes Acetaminophen (Tylenol), do not take additional Acetaminophen (Tylenol) while taking the prescribed medication.    Depression / Suicide Risk    As you are discharged from this [facility] facility, it is important to learn how to keep safe from harming yourself.    Recognize the warning signs:  · Abrupt changes in personality, positive or negative- including increase in energy   · Giving away possessions  · Change in eating patterns- significant weight changes-  positive or negative  · Change in sleeping patterns- unable to sleep or sleeping all the time   · Unwillingness or inability to communicate  · Depression  · Unusual sadness, discouragement and loneliness  · Talk of wanting to die  · Neglect of personal appearance   · Rebelliousness- reckless behavior  · Withdrawal from people/activities they love  · Confusion- inability to concentrate     If you or a loved one observes any of these behaviors or has concerns  about self-harm, here's what you can do:  · Talk about it- your feelings and reasons for harming yourself  · Remove any means that you might use to hurt yourself (examples: pills, rope, extension cords, firearm)  · Get professional help from the community (Mental Health, Substance Abuse, psychological counseling)  · Do not be alone:Call your Safe Contact- someone whom you trust who will be there for you.  · Call your local CRISIS HOTLINE 949-8442 or 577-745-4542  · Call your local Children's Mobile Crisis Response Team [place] (053) 159-9330 or www.RIWI  · Call the toll free National Suicide Prevention Hotlines   · National Suicide Prevention Lifeline 138-238-MDOO (2876)  · National Hope Line Network 800-SUICIDE (588-3081)

## 2020-08-22 NOTE — ANESTHESIA PREPROCEDURE EVALUATION
Relevant Problems   NEURO   (+) History of pregnancy induced hypertension      OB   (+) History of  delivery      Other   (+) Pilonidal cyst s/p drainage 18       Physical Exam    Airway   Mallampati: II  TM distance: >3 FB  Neck ROM: full       Cardiovascular - normal exam  Rhythm: regular  Rate: normal  (-) murmur     Dental - normal exam           Pulmonary - normal exam  Breath sounds clear to auscultation     Abdominal   (+) obese     Neurological - normal exam                 Anesthesia Plan    ASA 2       Plan - general       Airway plan will be ETT        Induction: intravenous    Postoperative Plan: Postoperative administration of opioids is intended.    Pertinent diagnostic labs and testing reviewed    Informed Consent:    Anesthetic plan and risks discussed with patient.    Use of blood products discussed with: patient whom consented to blood products.

## 2020-08-26 LAB
BACTERIA WND AEROBE CULT: ABNORMAL
BACTERIA WND AEROBE CULT: ABNORMAL
GRAM STN SPEC: ABNORMAL
SIGNIFICANT IND 70042: ABNORMAL
SITE SITE: ABNORMAL
SOURCE SOURCE: ABNORMAL

## 2020-08-27 LAB
BACTERIA SPEC ANAEROBE CULT: ABNORMAL
SIGNIFICANT IND 70042: ABNORMAL
SITE SITE: ABNORMAL
SOURCE SOURCE: ABNORMAL

## 2020-10-24 ENCOUNTER — HOSPITAL ENCOUNTER (OUTPATIENT)
Facility: MEDICAL CENTER | Age: 25
End: 2020-10-24
Attending: EMERGENCY MEDICINE
Payer: COMMERCIAL

## 2020-10-24 ENCOUNTER — OFFICE VISIT (OUTPATIENT)
Dept: URGENT CARE | Facility: CLINIC | Age: 25
End: 2020-10-24
Payer: COMMERCIAL

## 2020-10-24 VITALS
HEART RATE: 97 BPM | WEIGHT: 194 LBS | HEIGHT: 63 IN | DIASTOLIC BLOOD PRESSURE: 72 MMHG | OXYGEN SATURATION: 98 % | TEMPERATURE: 97.5 F | RESPIRATION RATE: 16 BRPM | BODY MASS INDEX: 34.38 KG/M2 | SYSTOLIC BLOOD PRESSURE: 128 MMHG

## 2020-10-24 DIAGNOSIS — R10.2 PELVIC PAIN: ICD-10-CM

## 2020-10-24 DIAGNOSIS — M54.50 ACUTE LEFT-SIDED LOW BACK PAIN WITHOUT SCIATICA: ICD-10-CM

## 2020-10-24 LAB
APPEARANCE UR: NORMAL
BILIRUB UR STRIP-MCNC: NEGATIVE MG/DL
COLOR UR AUTO: YELLOW
GLUCOSE UR STRIP.AUTO-MCNC: NEGATIVE MG/DL
INT CON NEG: NEGATIVE
INT CON POS: POSITIVE
KETONES UR STRIP.AUTO-MCNC: NEGATIVE MG/DL
LEUKOCYTE ESTERASE UR QL STRIP.AUTO: NEGATIVE
NITRITE UR QL STRIP.AUTO: NEGATIVE
PH UR STRIP.AUTO: 6 [PH] (ref 5–8)
POC URINE PREGNANCY TEST: NEGATIVE
PROT UR QL STRIP: NEGATIVE MG/DL
RBC UR QL AUTO: NORMAL
SP GR UR STRIP.AUTO: 1.03
UROBILINOGEN UR STRIP-MCNC: 0.2 MG/DL

## 2020-10-24 PROCEDURE — 99203 OFFICE O/P NEW LOW 30 MIN: CPT | Performed by: EMERGENCY MEDICINE

## 2020-10-24 PROCEDURE — 87591 N.GONORRHOEAE DNA AMP PROB: CPT

## 2020-10-24 PROCEDURE — 81025 URINE PREGNANCY TEST: CPT | Performed by: EMERGENCY MEDICINE

## 2020-10-24 PROCEDURE — 87491 CHLMYD TRACH DNA AMP PROBE: CPT

## 2020-10-24 PROCEDURE — 81002 URINALYSIS NONAUTO W/O SCOPE: CPT | Performed by: EMERGENCY MEDICINE

## 2020-10-24 ASSESSMENT — ENCOUNTER SYMPTOMS
SENSORY CHANGE: 0
ABDOMINAL PAIN: 1
NAUSEA: 0
DIARRHEA: 0
ANOREXIA: 0
HEMATOCHEZIA: 0
FOCAL WEAKNESS: 0
CHILLS: 0
CONSTIPATION: 1
FEVER: 0
VOMITING: 0

## 2020-10-24 ASSESSMENT — FIBROSIS 4 INDEX: FIB4 SCORE: 0.38

## 2020-10-24 NOTE — PROGRESS NOTES
Subjective:      Komal Hall is a 25 y.o. female who presents with Back Pain (x3 days, tailbone and lowr back pain) and Abdominal Pain (lower abdomen )            Abdominal Pain  This is a new problem. Episode onset: 3 days. The onset quality is sudden. The problem occurs daily. The problem has been waxing and waning. Pain location: groin. The quality of the pain is sharp. Pain radiation: sacral region. Associated symptoms include constipation. Pertinent negatives include no anorexia, diarrhea, dysuria, fever, frequency, hematochezia, hematuria, melena, nausea or vomiting. The pain is aggravated by certain positions. The pain is relieved by being still. There is no history of abdominal surgery.   No trauma.  Notes onset of groin to sacral pain when walking down stairs.  Notes recurrent episodes over the last few days.    Review of Systems   Constitutional: Negative for chills and fever.   Gastrointestinal: Positive for abdominal pain and constipation. Negative for anorexia, diarrhea, hematochezia, melena, nausea and vomiting.   Genitourinary: Negative for dysuria, frequency, hematuria and urgency.        LMP one month ago.  Denies pregnancy.  No vaginal discharge or bleeding. Denies sexually transmitted disease exposure or risk.     Skin: Negative for rash.   Neurological: Negative for sensory change and focal weakness.     Past Medical History:   Diagnosis Date   • Gestational hypertension, third trimester 1/28/2019   • Postpartum depression 4/5/2019   • Preeclampsia      Past Surgical History:   Procedure Laterality Date   • PILONIDAL CYST EXCISION  8/22/2020    Procedure: EXCISION, PILONIDAL CYST;  Surgeon: Carmella Schmitt M.D.;  Location: SURGERY [facility];  Service: General   • PRIMARY C SECTION  2/28/2019    Procedure: PRIMARY C SECTION;  Surgeon: Zulema Jones M.D.;  Location: LABOR AND DELIVERY;  Service: Obstetrics      Allergy:  Patient has no known allergies.   No current  "outpatient medications on file.   family history includes No Known Problems in her father, maternal grandfather, maternal grandmother, mother, paternal grandfather, and paternal grandmother.   Social History     Tobacco Use   • Smoking status: Never Smoker   • Smokeless tobacco: Never Used   Substance Use Topics   • Alcohol use: No   • Drug use: No         Objective:     /72 (Patient Position: Sitting)   Pulse 97   Temp 36.4 °C (97.5 °F) (Temporal)   Resp 16   Ht 1.6 m (5' 3\")   Wt 88 kg (194 lb)   LMP 09/04/2020 (Exact Date)   SpO2 98%   BMI 34.37 kg/m²      Physical Exam  Exam conducted with a chaperone present.   Constitutional:       General: She is not in acute distress.     Appearance: Normal appearance. She is well-developed. She is obese. She is not ill-appearing.   Cardiovascular:      Rate and Rhythm: Normal rate and regular rhythm.      Pulses:           Femoral pulses are 2+ on the right side and 2+ on the left side.     Heart sounds: Normal heart sounds.   Pulmonary:      Effort: Pulmonary effort is normal.      Breath sounds: Normal breath sounds.   Abdominal:      General: There is no distension.      Palpations: Abdomen is soft. There is no mass.      Tenderness: There is no abdominal tenderness. There is no right CVA tenderness or left CVA tenderness.   Genitourinary:     Exam position: Supine.      Pubic Area: No rash.       Labia:         Right: No rash, tenderness or lesion.         Left: No rash, tenderness or lesion.       Vagina: Normal.      Cervix: Normal.      Uterus: Normal.       Adnexa: Right adnexa normal and left adnexa normal.   Musculoskeletal:      Lumbar back: She exhibits tenderness. She exhibits normal range of motion, no deformity and no spasm.        Back:       Right lower leg: No edema.      Left lower leg: No edema.   Lymphadenopathy:      Lower Body: No right inguinal adenopathy. No left inguinal adenopathy.   Skin:     General: Skin is warm and dry.      " Findings: No rash or wound.   Neurological:      Mental Status: She is alert.      Motor: Motor function is intact.      Gait: Gait is intact.   Psychiatric:         Behavior: Behavior is cooperative.                 Assessment/Plan:        1. Pelvic pain  Trace blood- POCT Urinalysis  negative- POCT Pregnancy  - US-PELVIC TRANSABDOMINAL ONLY; Future  - CHLAMYDIA/GC PCR URINE OR SWAB; Future  ED if any worsening pain  2. Acute left-sided low back pain without sciatica  OTC analgesia prn

## 2020-10-25 DIAGNOSIS — R10.2 PELVIC PAIN: ICD-10-CM

## 2021-04-27 ENCOUNTER — IMMUNIZATION (OUTPATIENT)
Dept: FAMILY PLANNING/WOMEN'S HEALTH CLINIC | Facility: IMMUNIZATION CENTER | Age: 26
End: 2021-04-27
Payer: COMMERCIAL

## 2021-04-27 DIAGNOSIS — Z23 ENCOUNTER FOR VACCINATION: Primary | ICD-10-CM

## 2021-04-27 PROCEDURE — 0001A PFIZER SARS-COV-2 VACCINE: CPT

## 2021-04-27 PROCEDURE — 91300 PFIZER SARS-COV-2 VACCINE: CPT

## 2021-05-20 ENCOUNTER — IMMUNIZATION (OUTPATIENT)
Dept: FAMILY PLANNING/WOMEN'S HEALTH CLINIC | Facility: IMMUNIZATION CENTER | Age: 26
End: 2021-05-20
Payer: COMMERCIAL

## 2021-05-20 DIAGNOSIS — Z23 ENCOUNTER FOR VACCINATION: Primary | ICD-10-CM

## 2021-05-20 PROCEDURE — 0002A PFIZER SARS-COV-2 VACCINE: CPT

## 2021-05-20 PROCEDURE — 91300 PFIZER SARS-COV-2 VACCINE: CPT

## 2022-05-24 ENCOUNTER — HOSPITAL ENCOUNTER (OUTPATIENT)
Dept: LAB | Facility: MEDICAL CENTER | Age: 27
End: 2022-05-24
Attending: SPECIALIST
Payer: COMMERCIAL

## 2022-05-24 LAB
FSH SERPL-ACNC: 5.3 MIU/ML
LH SERPL-ACNC: 10.4 IU/L
PROLACTIN SERPL-MCNC: 13.1 NG/ML (ref 2.8–26)
TSH SERPL DL<=0.005 MIU/L-ACNC: 1.44 UIU/ML (ref 0.38–5.33)

## 2022-05-24 PROCEDURE — 36415 COLL VENOUS BLD VENIPUNCTURE: CPT

## 2022-05-24 PROCEDURE — 83001 ASSAY OF GONADOTROPIN (FSH): CPT

## 2022-05-24 PROCEDURE — 84443 ASSAY THYROID STIM HORMONE: CPT

## 2022-05-24 PROCEDURE — 84146 ASSAY OF PROLACTIN: CPT

## 2022-05-24 PROCEDURE — 83002 ASSAY OF GONADOTROPIN (LH): CPT

## 2022-05-24 PROCEDURE — 84402 ASSAY OF FREE TESTOSTERONE: CPT

## 2022-06-05 LAB — TESTOST FREE SERPL-MCNC: 4.4 PG/ML (ref 0.8–7.4)

## 2022-12-16 ENCOUNTER — PHARMACY VISIT (OUTPATIENT)
Dept: PHARMACY | Facility: MEDICAL CENTER | Age: 27
End: 2022-12-16
Payer: COMMERCIAL

## 2022-12-16 PROCEDURE — RXMED WILLOW AMBULATORY MEDICATION CHARGE: Performed by: INTERNAL MEDICINE

## 2022-12-16 RX ORDER — INFLUENZA A VIRUS A/BRISBANE/02/2018 IVR-190 (H1N1) ANTIGEN (FORMALDEHYDE INACTIVATED), INFLUENZA A VIRUS A/KANSAS/14/2017 X-327 (H3N2) ANTIGEN (FORMALDEHYDE INACTIVATED), INFLUENZA B VIRUS B/PHUKET/3073/2013 ANTIGEN (FORMALDEHYDE INACTIVATED), AND INFLUENZA B VIRUS B/MARYLAND/15/2016 BX-69A ANTIGEN (FORMALDEHYDE INACTIVATED) 15; 15; 15; 15 UG/.5ML; UG/.5ML; UG/.5ML; UG/.5ML
INJECTION, SUSPENSION INTRAMUSCULAR
Qty: 0.5 ML | Refills: 0 | Status: SHIPPED | OUTPATIENT
Start: 2022-12-16 | End: 2023-11-28

## 2023-02-02 ENCOUNTER — HOSPITAL ENCOUNTER (EMERGENCY)
Facility: MEDICAL CENTER | Age: 28
End: 2023-02-02
Attending: EMERGENCY MEDICINE
Payer: COMMERCIAL

## 2023-02-02 ENCOUNTER — APPOINTMENT (OUTPATIENT)
Dept: RADIOLOGY | Facility: MEDICAL CENTER | Age: 28
End: 2023-02-02
Attending: EMERGENCY MEDICINE
Payer: COMMERCIAL

## 2023-02-02 VITALS
WEIGHT: 169.75 LBS | BODY MASS INDEX: 30.07 KG/M2 | TEMPERATURE: 97.8 F | OXYGEN SATURATION: 98 % | SYSTOLIC BLOOD PRESSURE: 135 MMHG | DIASTOLIC BLOOD PRESSURE: 74 MMHG | HEART RATE: 94 BPM | RESPIRATION RATE: 20 BRPM

## 2023-02-02 DIAGNOSIS — O20.0 THREATENED MISCARRIAGE IN EARLY PREGNANCY: ICD-10-CM

## 2023-02-02 LAB
ALBUMIN SERPL BCP-MCNC: 4.6 G/DL (ref 3.2–4.9)
ALBUMIN/GLOB SERPL: 1.4 G/DL
ALP SERPL-CCNC: 88 U/L (ref 30–99)
ALT SERPL-CCNC: 16 U/L (ref 2–50)
ANION GAP SERPL CALC-SCNC: 12 MMOL/L (ref 7–16)
APPEARANCE UR: CLEAR
AST SERPL-CCNC: 17 U/L (ref 12–45)
B-HCG SERPL-ACNC: ABNORMAL MIU/ML (ref 0–5)
BACTERIA #/AREA URNS HPF: ABNORMAL /HPF
BASOPHILS # BLD AUTO: 0.5 % (ref 0–1.8)
BASOPHILS # BLD: 0.05 K/UL (ref 0–0.12)
BILIRUB SERPL-MCNC: 0.2 MG/DL (ref 0.1–1.5)
BILIRUB UR QL STRIP.AUTO: NEGATIVE
BUN SERPL-MCNC: 8 MG/DL (ref 8–22)
CALCIUM ALBUM COR SERPL-MCNC: 9.1 MG/DL (ref 8.5–10.5)
CALCIUM SERPL-MCNC: 9.6 MG/DL (ref 8.5–10.5)
CHLORIDE SERPL-SCNC: 106 MMOL/L (ref 96–112)
CO2 SERPL-SCNC: 22 MMOL/L (ref 20–33)
COLOR UR: YELLOW
CREAT SERPL-MCNC: 0.42 MG/DL (ref 0.5–1.4)
EOSINOPHIL # BLD AUTO: 0.22 K/UL (ref 0–0.51)
EOSINOPHIL NFR BLD: 2.1 % (ref 0–6.9)
EPI CELLS #/AREA URNS HPF: NEGATIVE /HPF
ERYTHROCYTE [DISTWIDTH] IN BLOOD BY AUTOMATED COUNT: 41.8 FL (ref 35.9–50)
GFR SERPLBLD CREATININE-BSD FMLA CKD-EPI: 138 ML/MIN/1.73 M 2
GLOBULIN SER CALC-MCNC: 3.4 G/DL (ref 1.9–3.5)
GLUCOSE SERPL-MCNC: 85 MG/DL (ref 65–99)
GLUCOSE UR STRIP.AUTO-MCNC: NEGATIVE MG/DL
HCT VFR BLD AUTO: 42.5 % (ref 37–47)
HGB BLD-MCNC: 14.1 G/DL (ref 12–16)
HYALINE CASTS #/AREA URNS LPF: ABNORMAL /LPF
IMM GRANULOCYTES # BLD AUTO: 0.02 K/UL (ref 0–0.11)
IMM GRANULOCYTES NFR BLD AUTO: 0.2 % (ref 0–0.9)
KETONES UR STRIP.AUTO-MCNC: NEGATIVE MG/DL
LEUKOCYTE ESTERASE UR QL STRIP.AUTO: NEGATIVE
LIPASE SERPL-CCNC: 27 U/L (ref 11–82)
LYMPHOCYTES # BLD AUTO: 2.73 K/UL (ref 1–4.8)
LYMPHOCYTES NFR BLD: 26.7 % (ref 22–41)
MCH RBC QN AUTO: 29.7 PG (ref 27–33)
MCHC RBC AUTO-ENTMCNC: 33.2 G/DL (ref 33.6–35)
MCV RBC AUTO: 89.5 FL (ref 81.4–97.8)
MICRO URNS: ABNORMAL
MONOCYTES # BLD AUTO: 0.62 K/UL (ref 0–0.85)
MONOCYTES NFR BLD AUTO: 6.1 % (ref 0–13.4)
NEUTROPHILS # BLD AUTO: 6.6 K/UL (ref 2–7.15)
NEUTROPHILS NFR BLD: 64.4 % (ref 44–72)
NITRITE UR QL STRIP.AUTO: NEGATIVE
NRBC # BLD AUTO: 0 K/UL
NRBC BLD-RTO: 0 /100 WBC
NUMBER OF RH DOSES IND 8505RD: NORMAL
PH UR STRIP.AUTO: 7 [PH] (ref 5–8)
PLATELET # BLD AUTO: 287 K/UL (ref 164–446)
PMV BLD AUTO: 11.4 FL (ref 9–12.9)
POTASSIUM SERPL-SCNC: 3.8 MMOL/L (ref 3.6–5.5)
PROT SERPL-MCNC: 8 G/DL (ref 6–8.2)
PROT UR QL STRIP: NEGATIVE MG/DL
RBC # BLD AUTO: 4.75 M/UL (ref 4.2–5.4)
RBC # URNS HPF: ABNORMAL /HPF
RBC UR QL AUTO: ABNORMAL
RH BLD: NORMAL
SODIUM SERPL-SCNC: 140 MMOL/L (ref 135–145)
SP GR UR STRIP.AUTO: 1
UROBILINOGEN UR STRIP.AUTO-MCNC: 0.2 MG/DL
WBC # BLD AUTO: 10.2 K/UL (ref 4.8–10.8)
WBC #/AREA URNS HPF: ABNORMAL /HPF

## 2023-02-02 PROCEDURE — 86901 BLOOD TYPING SEROLOGIC RH(D): CPT

## 2023-02-02 PROCEDURE — 83690 ASSAY OF LIPASE: CPT

## 2023-02-02 PROCEDURE — 85025 COMPLETE CBC W/AUTO DIFF WBC: CPT

## 2023-02-02 PROCEDURE — 81001 URINALYSIS AUTO W/SCOPE: CPT

## 2023-02-02 PROCEDURE — 84702 CHORIONIC GONADOTROPIN TEST: CPT

## 2023-02-02 PROCEDURE — 99284 EMERGENCY DEPT VISIT MOD MDM: CPT

## 2023-02-02 PROCEDURE — 76801 OB US < 14 WKS SINGLE FETUS: CPT

## 2023-02-02 PROCEDURE — 80053 COMPREHEN METABOLIC PANEL: CPT

## 2023-02-02 PROCEDURE — 36415 COLL VENOUS BLD VENIPUNCTURE: CPT

## 2023-02-03 NOTE — ED PROVIDER NOTES
"ED Provider Note    CHIEF COMPLAINT  Chief Complaint   Patient presents with    Pregnancy     Approx 7 weeks pregnant. Reports that she passed a \"dime sized blood clot.\"       HPI/ROS  LIMITATION TO HISTORY   Select: : None      Komal Hall is a 27 y.o. female who presents with vaginal bleeding.  Patient thinks she is approximately 7 weeks pregnant.  She is a G1 2 P1.  Yesterday she noticed some light pink tinge when she wipes.  Then she noticed 1 dime sized blood clot.  Since then she has not had any further bleeding or pink discharge.  She denies fevers.  No dysuria.  She has a little bit of pain in her left groin area that is worse when she ambulates a lot.  No abdominal pain.  No dizziness.  Vomiting or diarrhea.  She has not seen an OB/GYN yet.    PAST MEDICAL HISTORY   has a past medical history of Gestational hypertension, third trimester (1/28/2019), Postpartum depression (4/5/2019), and Preeclampsia.    SURGICAL HISTORY   has a past surgical history that includes primary c section (2/28/2019) and pilonidal cyst excision (8/22/2020).    FAMILY HISTORY  Family History   Problem Relation Age of Onset    No Known Problems Mother     No Known Problems Father     No Known Problems Maternal Grandmother     No Known Problems Maternal Grandfather     No Known Problems Paternal Grandmother     No Known Problems Paternal Grandfather        SOCIAL HISTORY  Social History     Tobacco Use    Smoking status: Never    Smokeless tobacco: Never   Vaping Use    Vaping Use: Never used   Substance and Sexual Activity    Alcohol use: No    Drug use: No    Sexual activity: Yes     Partners: Male     Birth control/protection: Pill     Comment: was taking pill for bc       CURRENT MEDICATIONS  Home Medications       Reviewed by Urszula Thomas R.N. (Registered Nurse) on 02/02/23 at 1812  Med List Status: Partial     Medication Last Dose Status   COVID-19mRNA Bival Vacc Pfizer (PFIZER COVID-19 BIVALENT) 30 " MCG/0.3ML Suspension injection  Active   influenza vaccine quad (FLUZONE QUADRIVALENT) 0.5 ML Suspension Prefilled Syringe injection  Active                    ALLERGIES  No Known Allergies    PHYSICAL EXAM  VITAL SIGNS: BP (!) 141/84   Pulse 86   Temp 36.6 °C (97.9 °F) (Temporal)   Resp 16   Wt 77 kg (169 lb 12.1 oz)   SpO2 99%   BMI 30.07 kg/m²      Constitutional: Alert in no apparent distress.  HENT: Normocephalic, Bilateral external ears normal. Nose normal.   Eyes: Pupils are equal and reactive. Conjunctiva normal, non-icteric.   Thorax & Lungs: Easy unlabored respirations, clear to auscultation throughout  Cardiovascular: Regular rate and rhythm  Abdomen:  No gross signs of peritonitis, no pain with movement, no suprapubic pain, no masses, no distention  Pelvic: deferred.  Skin: Visualized skin is  Dry, No erythema, No rash.   Extremities:   No edema, No asymmetry  Neurologic: Alert, Grossly non-focal.   Psychiatric: Affect and Mood normal         DIAGNOSTIC STUDIES / PROCEDURES      LABS  Results for orders placed or performed during the hospital encounter of 02/02/23   CBC WITH DIFFERENTIAL   Result Value Ref Range    WBC 10.2 4.8 - 10.8 K/uL    RBC 4.75 4.20 - 5.40 M/uL    Hemoglobin 14.1 12.0 - 16.0 g/dL    Hematocrit 42.5 37.0 - 47.0 %    MCV 89.5 81.4 - 97.8 fL    MCH 29.7 27.0 - 33.0 pg    MCHC 33.2 (L) 33.6 - 35.0 g/dL    RDW 41.8 35.9 - 50.0 fL    Platelet Count 287 164 - 446 K/uL    MPV 11.4 9.0 - 12.9 fL    Neutrophils-Polys 64.40 44.00 - 72.00 %    Lymphocytes 26.70 22.00 - 41.00 %    Monocytes 6.10 0.00 - 13.40 %    Eosinophils 2.10 0.00 - 6.90 %    Basophils 0.50 0.00 - 1.80 %    Immature Granulocytes 0.20 0.00 - 0.90 %    Nucleated RBC 0.00 /100 WBC    Neutrophils (Absolute) 6.60 2.00 - 7.15 K/uL    Lymphs (Absolute) 2.73 1.00 - 4.80 K/uL    Monos (Absolute) 0.62 0.00 - 0.85 K/uL    Eos (Absolute) 0.22 0.00 - 0.51 K/uL    Baso (Absolute) 0.05 0.00 - 0.12 K/uL    Immature Granulocytes  (abs) 0.02 0.00 - 0.11 K/uL    NRBC (Absolute) 0.00 K/uL   COMP METABOLIC PANEL   Result Value Ref Range    Sodium 140 135 - 145 mmol/L    Potassium 3.8 3.6 - 5.5 mmol/L    Chloride 106 96 - 112 mmol/L    Co2 22 20 - 33 mmol/L    Anion Gap 12.0 7.0 - 16.0    Glucose 85 65 - 99 mg/dL    Bun 8 8 - 22 mg/dL    Creatinine 0.42 (L) 0.50 - 1.40 mg/dL    Calcium 9.6 8.5 - 10.5 mg/dL    AST(SGOT) 17 12 - 45 U/L    ALT(SGPT) 16 2 - 50 U/L    Alkaline Phosphatase 88 30 - 99 U/L    Total Bilirubin 0.2 0.1 - 1.5 mg/dL    Albumin 4.6 3.2 - 4.9 g/dL    Total Protein 8.0 6.0 - 8.2 g/dL    Globulin 3.4 1.9 - 3.5 g/dL    A-G Ratio 1.4 g/dL   LIPASE   Result Value Ref Range    Lipase 27 11 - 82 U/L   HCG QUANTITATIVE   Result Value Ref Range    Bhcg 78823.0 (H) 0.0 - 5.0 mIU/mL   URINALYSIS,CULTURE IF INDICATED    Specimen: Urine   Result Value Ref Range    Color Yellow     Character Clear     Specific Gravity 1.005 <1.035    Ph 7.0 5.0 - 8.0    Glucose Negative Negative mg/dL    Ketones Negative Negative mg/dL    Protein Negative Negative mg/dL    Bilirubin Negative Negative    Urobilinogen, Urine 0.2 Negative    Nitrite Negative Negative    Leukocyte Esterase Negative Negative    Occult Blood Trace (A) Negative    Micro Urine Req Microscopic    RH TYPE FOR RHOGAM FROM E.D.   Result Value Ref Range    Emergency Department Rh Typing POS     Number Of Rh Doses Indicated ZERO    CORRECTED CALCIUM   Result Value Ref Range    Correct Calcium 9.1 8.5 - 10.5 mg/dL   ESTIMATED GFR   Result Value Ref Range    GFR (CKD-EPI) 138 >60 mL/min/1.73 m 2   URINE MICROSCOPIC (W/UA)   Result Value Ref Range    WBC 2-5 /hpf    RBC 0-2 /hpf    Bacteria Few (A) None /hpf    Epithelial Cells Negative /hpf    Hyaline Cast 0-2 /lpf        RADIOLOGY    Radiologist interpretation:   US-OB 1ST TRIMESTER WITH TRANSVAGINAL (COMBO)   Final Result      1.  Irregular shaped intrauterine gestational sac with subchorionic hemorrhage seen. There is a small fetal pole  seen which measures 6 weeks 1 day gestation by ultrasound. No fetal cardiac activity identified which suggests intrauterine fetal demise.    Occasionally fetal heart rate can be difficult to detect this early in pregnancy and consideration should be for follow-up ultrasound.      2.  2.3 cm right ovarian corpus luteum cyst.            COURSE & MEDICAL DECISION MAKING    ED Observation Status? No; Patient does not meet criteria for ED Observation.     INITIAL ASSESSMENT, COURSE AND PLAN  Care Narrative: Patient presents to the emergency department with vaginal bleeding in pregnancy.  No active bleeding.  Benign abdominal exam.  Plan is to evaluate for possible ectopic versus miscarriage versus bleeding in pregnancy.  No dysuria.  No focal right lower quadrant tenderness.  No fevers.    Laboratory studies show normal white count.  No anemia.  No significant electrolyte abnormalities.  Urine shows trace blood but no evidence of infection.  Beta hCG and Rh are still pending.    Ultrasound has returned and shows an irregular shaped intrauterine stational sac with subchorionic hemorrhage.  There is concern about a fetal demise as there is no cardiac activity.    I have advised patient to return in 2 days for a repeat beta hCG.  I have advised her that she may be having a miscarriage.  She is quite upset which is understandable.  I have advised her that when she may start bleeding and this is expected.  Bleeding precautions and miscarriage precautions were given.        DISPOSITION AND DISCUSSIONS  She is discharged in stable condition    FINAL DIAGNOSIS  1. Threatened miscarriage in early pregnancy           Electronically signed by: Ashley Bridges M.D., 2/2/2023 7:30 PM

## 2023-02-03 NOTE — ED NOTES
"Pt states she passed a single \"dime sized clot of blood\" earlier today. Pt denies any pain. Pt states she's 7 weeks pregnant.  "

## 2023-02-03 NOTE — DISCHARGE INSTRUCTIONS
You need a recheck in 2 days to further evaluate your pregnancy.  It looks like you may be having a miscarriage.  If you start bleeding more this would indicate you are having a miscarriage.  The recheck in 2 days involves checking your hormones to confirm a miscarriage.

## 2023-02-03 NOTE — ED NOTES
Pt provided discharge instructions and follow-up information. Pt verbalized understanding and all questions answered. Pt ambulated from ED with steady gait carrying all belongings.

## 2023-02-03 NOTE — ED TRIAGE NOTES
"Chief Complaint   Patient presents with    Pregnancy     Approx 7 weeks pregnant. Reports that she passed a \"dime sized blood clot.\"     , has not seen OBGYN yet for this pregnancy.   BP (!) 141/84   Pulse 86   Temp 36.6 °C (97.9 °F) (Temporal)   Resp 16   Wt 77 kg (169 lb 12.1 oz)   SpO2 99%   Pt informed of wait times. Educated on triage process.  Asked to return to triage RN for any new or worsening of symptoms. Thanked for patience.      "

## 2023-02-16 LAB
BLD GP AB SCN SERPL QL: NEGATIVE
HBV SURFACE AG SERPL QL IA: NON REACTIVE
HCV AB S/CO SERPL IA: NON REACTIVE
HGB BLD-MCNC: 13.8 G/DL
HIV 1 0 2 IC ZHVIC: NON REACTIVE
RH BLD: POSITIVE
RPR SER QL: NON REACTIVE
RUBV IGG SERPL IA-ACNC: NORMAL

## 2023-06-12 ENCOUNTER — HOSPITAL ENCOUNTER (OUTPATIENT)
Dept: LAB | Facility: MEDICAL CENTER | Age: 28
End: 2023-06-12
Attending: SPECIALIST
Payer: COMMERCIAL

## 2023-06-12 LAB
ALBUMIN SERPL BCP-MCNC: 3.7 G/DL (ref 3.2–4.9)
ALBUMIN/GLOB SERPL: 1.2 G/DL
ALP SERPL-CCNC: 94 U/L (ref 30–99)
ALT SERPL-CCNC: 21 U/L (ref 2–50)
AMYLASE SERPL-CCNC: 80 U/L (ref 20–103)
ANION GAP SERPL CALC-SCNC: 13 MMOL/L (ref 7–16)
AST SERPL-CCNC: 15 U/L (ref 12–45)
BILIRUB SERPL-MCNC: <0.2 MG/DL (ref 0.1–1.5)
BUN SERPL-MCNC: 7 MG/DL (ref 8–22)
CALCIUM ALBUM COR SERPL-MCNC: 9.2 MG/DL (ref 8.5–10.5)
CALCIUM SERPL-MCNC: 9 MG/DL (ref 8.5–10.5)
CHLORIDE SERPL-SCNC: 103 MMOL/L (ref 96–112)
CO2 SERPL-SCNC: 22 MMOL/L (ref 20–33)
CREAT SERPL-MCNC: 0.39 MG/DL (ref 0.5–1.4)
GFR SERPLBLD CREATININE-BSD FMLA CKD-EPI: 140 ML/MIN/1.73 M 2
GLOBULIN SER CALC-MCNC: 3.1 G/DL (ref 1.9–3.5)
GLUCOSE SERPL-MCNC: 77 MG/DL (ref 65–99)
LIPASE SERPL-CCNC: 20 U/L (ref 11–82)
POTASSIUM SERPL-SCNC: 3.7 MMOL/L (ref 3.6–5.5)
PROT SERPL-MCNC: 6.8 G/DL (ref 6–8.2)
SODIUM SERPL-SCNC: 138 MMOL/L (ref 135–145)

## 2023-06-12 PROCEDURE — 82150 ASSAY OF AMYLASE: CPT

## 2023-06-12 PROCEDURE — 36415 COLL VENOUS BLD VENIPUNCTURE: CPT

## 2023-06-12 PROCEDURE — 80053 COMPREHEN METABOLIC PANEL: CPT

## 2023-06-12 PROCEDURE — 83690 ASSAY OF LIPASE: CPT

## 2023-06-27 ENCOUNTER — HOSPITAL ENCOUNTER (OUTPATIENT)
Dept: LAB | Facility: MEDICAL CENTER | Age: 28
End: 2023-06-27
Attending: SPECIALIST
Payer: COMMERCIAL

## 2023-06-27 LAB
GLUCOSE 1H P 50 G GLC PO SERPL-MCNC: 135 MG/DL
GLUCOSE 1H P 50 G GLC PO SERPL-MCNC: 135 MG/DL (ref 70–139)
HCT VFR BLD AUTO: 39.2 % (ref 37–47)
HGB BLD-MCNC: 12.5 G/DL (ref 12–16)

## 2023-06-27 PROCEDURE — 85014 HEMATOCRIT: CPT

## 2023-06-27 PROCEDURE — 85018 HEMOGLOBIN: CPT

## 2023-06-27 PROCEDURE — 36415 COLL VENOUS BLD VENIPUNCTURE: CPT

## 2023-06-27 PROCEDURE — 82950 GLUCOSE TEST: CPT

## 2023-08-29 ENCOUNTER — INITIAL PRENATAL (OUTPATIENT)
Dept: OBGYN | Facility: CLINIC | Age: 28
End: 2023-08-29
Payer: COMMERCIAL

## 2023-08-29 VITALS — BODY MASS INDEX: 41.63 KG/M2 | SYSTOLIC BLOOD PRESSURE: 118 MMHG | WEIGHT: 235 LBS | DIASTOLIC BLOOD PRESSURE: 84 MMHG

## 2023-08-29 DIAGNOSIS — Z87.59 HISTORY OF PREGNANCY INDUCED HYPERTENSION: ICD-10-CM

## 2023-08-29 DIAGNOSIS — Z34.83 ENCOUNTER FOR SUPERVISION OF OTHER NORMAL PREGNANCY, THIRD TRIMESTER: ICD-10-CM

## 2023-08-29 DIAGNOSIS — Z98.891 HISTORY OF CESAREAN DELIVERY: ICD-10-CM

## 2023-08-29 PROCEDURE — 3074F SYST BP LT 130 MM HG: CPT | Performed by: STUDENT IN AN ORGANIZED HEALTH CARE EDUCATION/TRAINING PROGRAM

## 2023-08-29 PROCEDURE — 0502F SUBSEQUENT PRENATAL CARE: CPT | Performed by: STUDENT IN AN ORGANIZED HEALTH CARE EDUCATION/TRAINING PROGRAM

## 2023-08-29 PROCEDURE — 3079F DIAST BP 80-89 MM HG: CPT | Performed by: STUDENT IN AN ORGANIZED HEALTH CARE EDUCATION/TRAINING PROGRAM

## 2023-08-29 ASSESSMENT — FIBROSIS 4 INDEX: FIB4 SCORE: 0.31

## 2023-08-29 NOTE — PROGRESS NOTES
Transfer of Care Pregnancy Visit    CC: First OB Visit with [facility]  [facility]    HPI: Patient is a 28 y.o.  at 35w 6d by 6 week US who presents for her first OB visit with our practice.  She was previously seen by Dr. Mason.  She is feeling excellent fetal movement.  She denies vaginal bleeding, denies leakage of fluid, denies contractions.   She denies nausea/vomiting, headaches, or urinary symptoms.      Her last pregnancy was complicated by pre-e with severe features and delivery at 33 weeks.  Delivery via  section. She is currently on daily 81mg aspirin.  Desires repeat c/s.    DATING:   Estimated Date of Delivery: 2023 by 6 wk US    GYN HX:   Last Pap:   Hx Moderate or Severe Dysplasia : no  Hx STD : no    OBSTETRIC HISTORY:  OB History    Para Term  AB Living   2 1   1   1   SAB IAB Ectopic Molar Multiple Live Births           0 1      # Outcome Date GA Lbr Justin/2nd Weight Sex Delivery Anes PTL Lv   2 Current            1  19 34w5d  4 lb 11.3 oz M CS-LTranv EPI Y GA      Complications: Failure to Progress in First Stage       MEDICAL HISTORY:  Past Medical History:   Diagnosis Date    Gestational hypertension, third trimester 2019    Postpartum depression 2019    Preeclampsia        MEDICATIONS:  Current Outpatient Medications on File Prior to Visit   Medication Sig Dispense Refill    COVID-19mRNA Bival Vacc Pfizer (PFIZER COVID-19 BIVALENT) 30 MCG/0.3ML Suspension injection Inject  into the shoulder, thigh, or buttocks. 0.3 mL 0    influenza vaccine quad (FLUZONE QUADRIVALENT) 0.5 ML Suspension Prefilled Syringe injection Inject  into the shoulder, thigh, or buttocks. 0.5 mL 0     No current facility-administered medications on file prior to visit.       FAMILY HISTORY:  Family History   Problem Relation Age of Onset    No Known Problems Mother     No Known Problems Father     No Known Problems Maternal Grandmother     No Known Problems  Maternal Grandfather     No Known Problems Paternal Grandmother     No Known Problems Paternal Grandfather        SURGICAL HISTORY:  Past Surgical History:   Procedure Laterality Date    PILONIDAL CYST EXCISION  8/22/2020    Procedure: EXCISION, PILONIDAL CYST;  Surgeon: Carmella Schmitt M.D.;  Location: SURGERY [facility];  Service: General    PRIMARY C SECTION  2/28/2019    Procedure: PRIMARY C SECTION;  Surgeon: Zulema Jones M.D.;  Location: LABOR AND DELIVERY;  Service: Obstetrics       ALLERGIES / REACTIONS:  No Known Allergies             SOCIAL HISTORY:   reports that she has never smoked. She has never used smokeless tobacco. She reports that she does not drink alcohol and does not use drugs.    ROS:   Gen: no fevers or chills, no significant weight loss or gain, excessive fatigue  Respiratory:  no cough or dyspnea  Cardiac:  no chest pain, no palpitations, no syncope  Breast: no breast discharge, pain, lump or skin changes  GI:  no heartburn, no abdominal pain, no nausea or vomiting  Urinary: no dysuria, urgency, frequency, incontinence   Psych: no depression or anxiety  Neuro: no migraines with aura, fainting spells, numbness or tingling  Extremities: no joint pain, persistently swollen ankles, recurrent leg cramps         PHYSICAL EXAMINATION:  Vital Signs:   Vitals:    08/29/23 0954   BP: 118/84   Weight: 235 lb     Body mass index is 41.63 kg/m².  Constitutional: The patient is well developed and well nourished.  Psychiatric: Patient is oriented to time place and person.   Skin: No rash observed.  Neck: Neck appears symmetric. There are no masses or adenopathy present.  Respiratory: normal effort  Abdomen: Soft, non-tender.  Pelvic:    Deferred  Extremeties: Legs are symmetric and without tenderness. There is no edema present.  Obstetrics:   bpm   Fundal Height 36cm    ACOG SCREENING  Infection Prevention               ASSESSMENT AND PLAN:  Patient Active Problem List    Diagnosis  Date Noted    History of  delivery 2019    History of pregnancy induced hypertension 2019    Postpartum depression 2019    Pilonidal cyst s/p drainage 9/28/18 10/02/2018       28 y.o.  at 35 weeks 6 days  - PNL and STI screening reviewed in records in [place]  - Dating reviewed: Dated by 6 wk US  - Low Risk NIPT; MSAFP negative  - Has received Tdap vaccine this pregnancy  - Continue daily 81mg aspirin  - Discussed office policies, prenatal care timeline, weight gain, diet and activity.  - Taking PNV.  - Increase water intake and encouraged healthy nutrition. Encouraged moderate exercise may continue into final trimester.   - GBS next visit  - Confirm vertex next visit  - Request sent to schedule repeat c/s for 2023    Return in 1 weeks for next prenatal visit     Misael Bragg DO, FACOG

## 2023-09-06 ENCOUNTER — APPOINTMENT (OUTPATIENT)
Dept: ADMISSIONS | Facility: MEDICAL CENTER | Age: 28
End: 2023-09-06
Attending: OBSTETRICS & GYNECOLOGY
Payer: COMMERCIAL

## 2023-09-07 ENCOUNTER — ROUTINE PRENATAL (OUTPATIENT)
Dept: OBGYN | Facility: CLINIC | Age: 28
End: 2023-09-07
Payer: COMMERCIAL

## 2023-09-07 ENCOUNTER — HOSPITAL ENCOUNTER (OUTPATIENT)
Facility: MEDICAL CENTER | Age: 28
End: 2023-09-07
Attending: STUDENT IN AN ORGANIZED HEALTH CARE EDUCATION/TRAINING PROGRAM
Payer: COMMERCIAL

## 2023-09-07 ENCOUNTER — HOSPITAL ENCOUNTER (OUTPATIENT)
Dept: LAB | Facility: MEDICAL CENTER | Age: 28
End: 2023-09-07
Attending: STUDENT IN AN ORGANIZED HEALTH CARE EDUCATION/TRAINING PROGRAM
Payer: COMMERCIAL

## 2023-09-07 VITALS — WEIGHT: 242 LBS | BODY MASS INDEX: 42.87 KG/M2 | DIASTOLIC BLOOD PRESSURE: 90 MMHG | SYSTOLIC BLOOD PRESSURE: 135 MMHG

## 2023-09-07 DIAGNOSIS — Z34.83 PRENATAL CARE, SUBSEQUENT PREGNANCY IN THIRD TRIMESTER: ICD-10-CM

## 2023-09-07 LAB
ALBUMIN SERPL BCP-MCNC: 3.5 G/DL (ref 3.2–4.9)
ALBUMIN/GLOB SERPL: 1 G/DL
ALP SERPL-CCNC: 187 U/L (ref 30–99)
ALT SERPL-CCNC: 17 U/L (ref 2–50)
ANION GAP SERPL CALC-SCNC: 11 MMOL/L (ref 7–16)
AST SERPL-CCNC: 21 U/L (ref 12–45)
BILIRUB SERPL-MCNC: <0.2 MG/DL (ref 0.1–1.5)
BUN SERPL-MCNC: 11 MG/DL (ref 8–22)
CALCIUM ALBUM COR SERPL-MCNC: 9.6 MG/DL (ref 8.5–10.5)
CALCIUM SERPL-MCNC: 9.2 MG/DL (ref 8.5–10.5)
CHLORIDE SERPL-SCNC: 106 MMOL/L (ref 96–112)
CO2 SERPL-SCNC: 20 MMOL/L (ref 20–33)
CREAT SERPL-MCNC: 0.64 MG/DL (ref 0.5–1.4)
FASTING STATUS PATIENT QL REPORTED: NORMAL
GFR SERPLBLD CREATININE-BSD FMLA CKD-EPI: 124 ML/MIN/1.73 M 2
GLOBULIN SER CALC-MCNC: 3.4 G/DL (ref 1.9–3.5)
GLUCOSE SERPL-MCNC: 60 MG/DL (ref 65–99)
POTASSIUM SERPL-SCNC: 4.2 MMOL/L (ref 3.6–5.5)
PROT SERPL-MCNC: 6.9 G/DL (ref 6–8.2)
SODIUM SERPL-SCNC: 137 MMOL/L (ref 135–145)
URATE SERPL-MCNC: 6.3 MG/DL (ref 1.9–8.2)

## 2023-09-07 PROCEDURE — 0502F SUBSEQUENT PRENATAL CARE: CPT | Performed by: STUDENT IN AN ORGANIZED HEALTH CARE EDUCATION/TRAINING PROGRAM

## 2023-09-07 PROCEDURE — 84156 ASSAY OF PROTEIN URINE: CPT

## 2023-09-07 PROCEDURE — 84550 ASSAY OF BLOOD/URIC ACID: CPT

## 2023-09-07 PROCEDURE — 87081 CULTURE SCREEN ONLY: CPT

## 2023-09-07 PROCEDURE — 82570 ASSAY OF URINE CREATININE: CPT

## 2023-09-07 PROCEDURE — 80053 COMPREHEN METABOLIC PANEL: CPT

## 2023-09-07 PROCEDURE — 3075F SYST BP GE 130 - 139MM HG: CPT | Performed by: STUDENT IN AN ORGANIZED HEALTH CARE EDUCATION/TRAINING PROGRAM

## 2023-09-07 PROCEDURE — 87150 DNA/RNA AMPLIFIED PROBE: CPT

## 2023-09-07 PROCEDURE — 36415 COLL VENOUS BLD VENIPUNCTURE: CPT

## 2023-09-07 PROCEDURE — 3080F DIAST BP >= 90 MM HG: CPT | Performed by: STUDENT IN AN ORGANIZED HEALTH CARE EDUCATION/TRAINING PROGRAM

## 2023-09-07 ASSESSMENT — FIBROSIS 4 INDEX: FIB4 SCORE: 0.31

## 2023-09-07 NOTE — PROGRESS NOTES
S: 28 y.o.  at 37w1d presents for routine obstetric follow-up.   Good fetal movement.  No contractions, vaginal bleeding, or leakage of fluid.    Has noted over the past two days she has had intermittent HA's. Hx of pre-eclampsia with  with her last pregnancy at 33 weeks. Denies any visual changes including double vision, blurred vision, scotoma, or photophobia. Denies any RUQ pain. No CP or difficulty breathing. Has been taking BP at home and noted yesterday it was 140/90.   Questions answered.    O: BP (!) 135/90   Wt 242 lb   LMP 12/15/2022   BMI 42.87 kg/m²   Patients' weight gain, fluid intake and exercise level discussed.  Vitals, fundal height , fetal position, and FHR reviewed on flowsheet    Lab:No results found for this or any previous visit (from the past 336 hour(s)).  TDaP:  Has received Tdap per records scanned into media  GBS: collected today in clinic  Rh: positive  BTL: declines    A/P:  28 y.o.  at 37w1d presents for routine obstetric follow-up.  Size equals dates and/or scan    - Continue daily 81 mg ASA  - STRICT ED precautions discussed with the patient and her . If her HA is getting worse or if she is seeing continued elevated BP numbers at home she will go to L&D.   - Continue prenatal vitamins- pills not gummies.  - Fetal kick counts.  - Exercise at least 30 minutes daily. Drink at least 3-4L of water daily  - PP contraception discussed  - Labor precautions educated.    Follow-up in 1 weeks.    Mallory Francisco P.A.-C.

## 2023-09-08 ENCOUNTER — TELEPHONE (OUTPATIENT)
Dept: OBGYN | Facility: CLINIC | Age: 28
End: 2023-09-08
Payer: COMMERCIAL

## 2023-09-08 LAB
CREAT UR-MCNC: 141.35 MG/DL
GP B STREP DNA SPEC QL NAA+PROBE: NEGATIVE
PROT UR-MCNC: 21 MG/DL (ref 0–15)
PROT/CREAT UR: 149 MG/G (ref 10–107)

## 2023-09-08 NOTE — TELEPHONE ENCOUNTER
Called pt this morning asked how she is feeling  Asked if she repeated her bp last night and pt stated she did.  She did not document them so doesn't have exact numbers but stated it was within normal range  Advised of labs looking ok per Abdiel  No further questions at this time

## 2023-09-09 ENCOUNTER — HOSPITAL ENCOUNTER (EMERGENCY)
Facility: MEDICAL CENTER | Age: 28
End: 2023-09-09
Attending: STUDENT IN AN ORGANIZED HEALTH CARE EDUCATION/TRAINING PROGRAM | Admitting: STUDENT IN AN ORGANIZED HEALTH CARE EDUCATION/TRAINING PROGRAM
Payer: COMMERCIAL

## 2023-09-09 VITALS
HEART RATE: 78 BPM | WEIGHT: 243 LBS | RESPIRATION RATE: 16 BRPM | TEMPERATURE: 98.2 F | HEIGHT: 64 IN | SYSTOLIC BLOOD PRESSURE: 136 MMHG | BODY MASS INDEX: 41.48 KG/M2 | DIASTOLIC BLOOD PRESSURE: 93 MMHG

## 2023-09-09 LAB
ERYTHROCYTE [DISTWIDTH] IN BLOOD BY AUTOMATED COUNT: 46.6 FL (ref 35.9–50)
HCT VFR BLD AUTO: 39.3 % (ref 37–47)
HGB BLD-MCNC: 13 G/DL (ref 12–16)
MCH RBC QN AUTO: 28.1 PG (ref 27–33)
MCHC RBC AUTO-ENTMCNC: 33.1 G/DL (ref 32.2–35.5)
MCV RBC AUTO: 85.1 FL (ref 81.4–97.8)
PLATELET # BLD AUTO: 169 K/UL (ref 164–446)
PMV BLD AUTO: 12.6 FL (ref 9–12.9)
RBC # BLD AUTO: 4.62 M/UL (ref 4.2–5.4)
WBC # BLD AUTO: 9.2 K/UL (ref 4.8–10.8)

## 2023-09-09 PROCEDURE — 36415 COLL VENOUS BLD VENIPUNCTURE: CPT

## 2023-09-09 PROCEDURE — 99999 PR NO CHARGE: CPT | Performed by: STUDENT IN AN ORGANIZED HEALTH CARE EDUCATION/TRAINING PROGRAM

## 2023-09-09 PROCEDURE — 59025 FETAL NON-STRESS TEST: CPT | Mod: 26 | Performed by: STUDENT IN AN ORGANIZED HEALTH CARE EDUCATION/TRAINING PROGRAM

## 2023-09-09 PROCEDURE — 85027 COMPLETE CBC AUTOMATED: CPT

## 2023-09-09 PROCEDURE — 99283 EMERGENCY DEPT VISIT LOW MDM: CPT

## 2023-09-09 PROCEDURE — 59025 FETAL NON-STRESS TEST: CPT

## 2023-09-09 ASSESSMENT — PAIN SCALES - GENERAL: PAINLEVEL: 0 - NO PAIN

## 2023-09-09 ASSESSMENT — PAIN DESCRIPTION - PAIN TYPE: TYPE: ACUTE PAIN

## 2023-09-09 ASSESSMENT — FIBROSIS 4 INDEX: FIB4 SCORE: 0.48

## 2023-09-09 NOTE — ED TRIAGE NOTES
PATIENT ID:  NAME:  Komal Hall  MRN:               [id number]  YOB: 1995     28 y.o. female  at 37w3d with a history of  preeclampsia with severe features and CS delivery.     Subjective: Pt presents due to elevated blood pressures at home. She is otherwise feeling well, denies HA, vision changes, RUQ or epigastric pain, LE edema. She was instructed to take her blood pressures at home at her last visit, when she had a diastolic of 90. She has a history of preeclampsia with severe features with her last delivery, but felt terrible then, and today feels well.   Pregnancy complicated by gestational hypertension    negative  For CTXS.   negative Feels pain   negative for LOF  negative for vaginal bleeding.   positive for fetal movement    ROS: Patient denies any fever chills, nausea, vomiting, headache, chest pain, shortness of breath, or dysuria or unusual swelling of hands or feet.     Objective:    Vitals:    23 1451 23 1506 23 1521 23 1543   BP: (!) 140/89 134/85 134/88 (!) 144/92   Pulse: 82 81 82 76   Resp:    16   Temp:    36.8 °C (98.2 °F)   TempSrc:    Temporal   Weight:       Height:         Temp (24hrs), Av.8 °C (98.2 °F), Min:36.8 °C (98.2 °F), Max:36.8 °C (98.2 °F)    General: No acute distress, resting comfortably in bed.  HEENT: normocephalic, nontraumatic, PERRLA, EOMI  Cardiovascular: Heart RRR with no murmurs, rubs or gallops. Distal Pulses 2+  Respiratory: symmetric chest expansion, lungs CTAB, with no wheezes, rales, rhonci  Abdomen: gravid, nontender  Musculoskeletal: strength 5/5 in four extremities  Neuro: non focal with no numbness, tingling or changes in sensation    Cervix:  Closed/high  [place]: Rare contractions  FHRM: Baseline 140, + Accels, no decels, mod variability, reactive NST    PEC labs wnl, P:C .149    Assessment: 28 y.o. female  at 37w3d with gestational hypertension. Blood pressures 130-150/80-90s.  "Discussed this would warrant delivery. Patient has a lot of fear around an \"unscheduled CS\" as her last delivery was emergent, and strongly does not want delivery today. Additionally, and she last ate two hours ago. Patient is amenable to scheduled CS delivery tomorrow, so scheduled for opening at noon.    Daxa Bradley M.D.   "

## 2023-09-09 NOTE — PROGRESS NOTES
1426- Pt presented in with elevated blood pressures. Pt complains of noticing BP elevate at home from 120/80's to high 150/102. Pt denies any c/o of headaches, altered vision changes or any additional symptoms. Pt denies feeling any ctx and +FM. Pt blood pressures set to cycle continuously q15 minutes to monitor pt BP. Upon SVE, pt is closed thick and high. Pt confirms she was pre-E with current and last pregnancy and had a c/s at 33 weeks previously due to high BP's.    0354- Call made Reilly justin of pt arrival and status. MD in a delivery and will contact RN once finished.    1538- Dr. Bradley at bedside to discuss sx with pt. MD and pt agrees for pt to continue to monitor BP's at home day and night and to move pt scheduled c/s to tomorrow at noon. Before pt discharge MD orders for pt to have a CBC completed.

## 2023-09-09 NOTE — DISCHARGE INSTRUCTIONS
Labor Instructions (37 - 39 weeks):  Call your physician or return to hospital if:  You have regular contractions that get progressively closer, longer and stronger.  Your water breaks (remember time and color).  You have bleeding like a period.  Your baby does not move enough to complete the daily kick counts (10 movements in 2 hours)  Your baby moves much less often than on the days before or you have not felt your baby move all day.  Preeclampsia and Eclampsia  Preeclampsia is a serious condition that may develop during pregnancy. This condition involves high blood pressure during pregnancy and causes symptoms such as headaches, vision changes, and increased swelling in the legs, hands, and face. Preeclampsia occurs after 20 weeks of pregnancy. Eclampsia is a seizure that happens from worsening preeclampsia. Diagnosing and managing preeclampsia early is important. If not treated early, it can cause serious problems for mother and baby.  There is no cure for this condition. However, during pregnancy, delivering the baby may be the best treatment for preeclampsia or eclampsia. For most women, symptoms of preeclampsia and eclampsia go away after giving birth. In rare cases, a woman may develop preeclampsia or eclampsia after giving birth. This usually occurs within 48 hours after childbirth but may occur up to 6 weeks after giving birth.  What are the causes?  The cause of this condition is not known.  What increases the risk?  The following factors make you more likely to develop preeclampsia:  Being pregnant for the first time or being pregnant with multiples.  Having had preeclampsia or a condition called hemolysis, elevated liver enzymes, and low platelet count (HELLP)syndrome during a past pregnancy.  Having a family history of preeclampsia.  Being older than age 35.  Being obese.  Becoming pregnant through fertility treatments.  Conditions that reduce blood flow or oxygen to your placenta and baby may also  increase your risk. These include:  High blood pressure before, during, or immediately following pregnancy.  Kidney disease.  Diabetes.  Blood clotting disorders.  Autoimmune diseases, such as lupus.  Sleep apnea.  What are the signs or symptoms?  Common symptoms of this condition include:  A severe, throbbing headache that does not go away.  Vision problems, such as blurred or double vision and light sensitivity.  Pain in the stomach, especially the right upper region.  Pain in the shoulder.  Other symptoms that may develop as the condition gets worse include:  Sudden weight gain because of fluid buildup in the body. This causes swelling of the face, hands, legs, and feet.  Severe nausea and vomiting.  Urinating less than usual.  Shortness of breath.  Seizures.  How is this diagnosed?  Your health care provider will ask you about symptoms and check for signs of preeclampsia during your prenatal visits. You will also have routine tests, including:  Checking your blood pressure.  Urine tests to check for protein.  Blood tests to assess your organ function.  Monitoring your baby's heart rate.  Ultrasounds to check fetal growth.  How is this treated?  You and your health care provider will determine the treatment that is best for you. Treatment may include:  Frequent prenatal visits to check for preeclampsia.  Medicine to lower your blood pressure.  Medicine to prevent seizures.  Low-dose aspirin during your pregnancy.  Staying in the hospital, in severe cases. You will be given medicines to control your blood pressure and the amount of fluids in your body.  Delivering your baby.  Work with your health care provider to manage any chronic health conditions, such as diabetes or kidney problems. Also, work with your health care provider to manage weight gain during pregnancy.  Follow these instructions at home:  Eating and drinking  Drink enough fluid to keep your urine pale yellow.  Avoid caffeine. Caffeine may increase  blood pressure and heart rate and lead to dehydration.  Reduce the amount of salt that you eat.  Lifestyle  Do not use any products that contain nicotine or tobacco. These products include cigarettes, chewing tobacco, and vaping devices, such as e-cigarettes. If you need help quitting, ask your health care provider.  Do not use alcohol or drugs.  Avoid stress as much as possible.  Rest and get plenty of sleep.  General instructions    Take over-the-counter and prescription medicines only as told by your health care provider.  When lying down, lie on your left side. This keeps pressure off your major blood vessels.  When sitting or lying down, raise (elevate) your feet. Try putting pillows underneath your lower legs.  Exercise regularly. Ask your health care provider what kinds of exercise are best for you.  Check your blood pressure as often as recommended by your health care provider.  Keep all prenatal and follow-up visits. This is important.  Contact a health care provider if:  You have symptoms that may need treatment or closer monitoring. These include:  Headaches.  Stomach pain or nausea and vomiting.  Shoulder pain.  Vision problems, such as spots in front of your eyes or blurry vision.  Sudden weight gain or increased swelling in your face, hands, legs, and feet.  Increased anxiety or feeling of impending doom.  Signs or symptoms of labor.  Get help right away if:  You have any of the following symptoms:  A seizure.  Shortness of breath or trouble breathing.  Trouble speaking or slurred speech.  Fainting.  Chest pain.  These symptoms may represent a serious problem that is an emergency. Do not wait to see if the symptoms will go away. Get medical help right away. Call your local emergency services (911 in the [place] Do not drive yourself to the hospital.  Summary  Preeclampsia is a serious condition that may develop during pregnancy.  Diagnosing and treating preeclampsia early is very important.  Keep all  prenatal and follow-up visits. This is important.  Get help right away if you have a seizure, shortness of breath or trouble breathing, trouble speaking or slurred speech, chest pain, or fainting.  This information is not intended to replace advice given to you by your health care provider. Make sure you discuss any questions you have with your health care provider.  Document Revised: 09/09/2021 Document Reviewed: 09/09/2021  Elsevier Patient Education © 2023 [employer]

## 2023-09-10 ENCOUNTER — ANESTHESIA (OUTPATIENT)
Dept: OBGYN | Facility: MEDICAL CENTER | Age: 28
End: 2023-09-10
Payer: COMMERCIAL

## 2023-09-10 ENCOUNTER — ANESTHESIA EVENT (OUTPATIENT)
Dept: OBGYN | Facility: MEDICAL CENTER | Age: 28
End: 2023-09-10
Payer: COMMERCIAL

## 2023-09-10 ENCOUNTER — HOSPITAL ENCOUNTER (INPATIENT)
Facility: MEDICAL CENTER | Age: 28
LOS: 2 days | End: 2023-09-12
Attending: OBSTETRICS & GYNECOLOGY | Admitting: OBSTETRICS & GYNECOLOGY
Payer: COMMERCIAL

## 2023-09-10 DIAGNOSIS — Z78.9 BREASTFEEDING (INFANT): Primary | ICD-10-CM

## 2023-09-10 LAB
ALBUMIN SERPL BCP-MCNC: 3.5 G/DL (ref 3.2–4.9)
ALBUMIN/GLOB SERPL: 1.1 G/DL
ALP SERPL-CCNC: 187 U/L (ref 30–99)
ALT SERPL-CCNC: 13 U/L (ref 2–50)
ANION GAP SERPL CALC-SCNC: 12 MMOL/L (ref 7–16)
AST SERPL-CCNC: 19 U/L (ref 12–45)
BASOPHILS # BLD AUTO: 0.3 % (ref 0–1.8)
BASOPHILS # BLD: 0.03 K/UL (ref 0–0.12)
BILIRUB SERPL-MCNC: 0.2 MG/DL (ref 0.1–1.5)
BUN SERPL-MCNC: 10 MG/DL (ref 8–22)
CALCIUM ALBUM COR SERPL-MCNC: 9.4 MG/DL (ref 8.5–10.5)
CALCIUM SERPL-MCNC: 9 MG/DL (ref 8.5–10.5)
CHLORIDE SERPL-SCNC: 105 MMOL/L (ref 96–112)
CO2 SERPL-SCNC: 19 MMOL/L (ref 20–33)
CREAT SERPL-MCNC: 0.46 MG/DL (ref 0.5–1.4)
CREAT UR-MCNC: 85.04 MG/DL
EOSINOPHIL # BLD AUTO: 0.08 K/UL (ref 0–0.51)
EOSINOPHIL NFR BLD: 0.9 % (ref 0–6.9)
ERYTHROCYTE [DISTWIDTH] IN BLOOD BY AUTOMATED COUNT: 46.4 FL (ref 35.9–50)
GFR SERPLBLD CREATININE-BSD FMLA CKD-EPI: 134 ML/MIN/1.73 M 2
GLOBULIN SER CALC-MCNC: 3.3 G/DL (ref 1.9–3.5)
GLUCOSE SERPL-MCNC: 67 MG/DL (ref 65–99)
HCT VFR BLD AUTO: 39.5 % (ref 37–47)
HGB BLD-MCNC: 13.2 G/DL (ref 12–16)
HOLDING TUBE BB 8507: NORMAL
IMM GRANULOCYTES # BLD AUTO: 0.04 K/UL (ref 0–0.11)
IMM GRANULOCYTES NFR BLD AUTO: 0.5 % (ref 0–0.9)
LYMPHOCYTES # BLD AUTO: 1.39 K/UL (ref 1–4.8)
LYMPHOCYTES NFR BLD: 15.8 % (ref 22–41)
MCH RBC QN AUTO: 28.1 PG (ref 27–33)
MCHC RBC AUTO-ENTMCNC: 33.4 G/DL (ref 32.2–35.5)
MCV RBC AUTO: 84 FL (ref 81.4–97.8)
MONOCYTES # BLD AUTO: 0.58 K/UL (ref 0–0.85)
MONOCYTES NFR BLD AUTO: 6.6 % (ref 0–13.4)
NEUTROPHILS # BLD AUTO: 6.65 K/UL (ref 1.82–7.42)
NEUTROPHILS NFR BLD: 75.9 % (ref 44–72)
NRBC # BLD AUTO: 0 K/UL
NRBC BLD-RTO: 0 /100 WBC (ref 0–0.2)
PLATELET # BLD AUTO: 174 K/UL (ref 164–446)
PMV BLD AUTO: 13.5 FL (ref 9–12.9)
POTASSIUM SERPL-SCNC: 4.1 MMOL/L (ref 3.6–5.5)
PROT SERPL-MCNC: 6.8 G/DL (ref 6–8.2)
PROT UR-MCNC: 19 MG/DL (ref 0–15)
PROT/CREAT UR: 223 MG/G (ref 10–107)
RBC # BLD AUTO: 4.7 M/UL (ref 4.2–5.4)
SODIUM SERPL-SCNC: 136 MMOL/L (ref 135–145)
T PALLIDUM AB SER QL IA: NORMAL
WBC # BLD AUTO: 8.8 K/UL (ref 4.8–10.8)

## 2023-09-10 PROCEDURE — 85025 COMPLETE CBC W/AUTO DIFF WBC: CPT

## 2023-09-10 PROCEDURE — 700111 HCHG RX REV CODE 636 W/ 250 OVERRIDE (IP): Mod: JZ

## 2023-09-10 PROCEDURE — 59515 CESAREAN DELIVERY: CPT | Performed by: STUDENT IN AN ORGANIZED HEALTH CARE EDUCATION/TRAINING PROGRAM

## 2023-09-10 PROCEDURE — 160035 HCHG PACU - 1ST 60 MINS PHASE I: Performed by: OBSTETRICS & GYNECOLOGY

## 2023-09-10 PROCEDURE — 700105 HCHG RX REV CODE 258

## 2023-09-10 PROCEDURE — 160048 HCHG OR STATISTICAL LEVEL 1-5: Performed by: OBSTETRICS & GYNECOLOGY

## 2023-09-10 PROCEDURE — A9270 NON-COVERED ITEM OR SERVICE: HCPCS | Performed by: INTERNAL MEDICINE

## 2023-09-10 PROCEDURE — 700111 HCHG RX REV CODE 636 W/ 250 OVERRIDE (IP): Mod: JZ | Performed by: INTERNAL MEDICINE

## 2023-09-10 PROCEDURE — 82570 ASSAY OF URINE CREATININE: CPT

## 2023-09-10 PROCEDURE — 80053 COMPREHEN METABOLIC PANEL: CPT

## 2023-09-10 PROCEDURE — 36415 COLL VENOUS BLD VENIPUNCTURE: CPT

## 2023-09-10 PROCEDURE — 700101 HCHG RX REV CODE 250: Performed by: INTERNAL MEDICINE

## 2023-09-10 PROCEDURE — 700105 HCHG RX REV CODE 258: Performed by: INTERNAL MEDICINE

## 2023-09-10 PROCEDURE — 700102 HCHG RX REV CODE 250 W/ 637 OVERRIDE(OP): Performed by: INTERNAL MEDICINE

## 2023-09-10 PROCEDURE — 160029 HCHG SURGERY MINUTES - 1ST 30 MINS LEVEL 4: Performed by: OBSTETRICS & GYNECOLOGY

## 2023-09-10 PROCEDURE — 84156 ASSAY OF PROTEIN URINE: CPT

## 2023-09-10 PROCEDURE — 700111 HCHG RX REV CODE 636 W/ 250 OVERRIDE (IP): Performed by: INTERNAL MEDICINE

## 2023-09-10 PROCEDURE — 700111 HCHG RX REV CODE 636 W/ 250 OVERRIDE (IP): Performed by: OBSTETRICS & GYNECOLOGY

## 2023-09-10 PROCEDURE — C1755 CATHETER, INTRASPINAL: HCPCS | Performed by: OBSTETRICS & GYNECOLOGY

## 2023-09-10 PROCEDURE — 700105 HCHG RX REV CODE 258: Performed by: OBSTETRICS & GYNECOLOGY

## 2023-09-10 PROCEDURE — 770002 HCHG ROOM/CARE - OB PRIVATE (112)

## 2023-09-10 PROCEDURE — 160009 HCHG ANES TIME/MIN: Performed by: OBSTETRICS & GYNECOLOGY

## 2023-09-10 PROCEDURE — 160041 HCHG SURGERY MINUTES - EA ADDL 1 MIN LEVEL 4: Performed by: OBSTETRICS & GYNECOLOGY

## 2023-09-10 PROCEDURE — 160002 HCHG RECOVERY MINUTES (STAT): Performed by: OBSTETRICS & GYNECOLOGY

## 2023-09-10 PROCEDURE — 86780 TREPONEMA PALLIDUM: CPT

## 2023-09-10 RX ORDER — DEXAMETHASONE SODIUM PHOSPHATE 4 MG/ML
INJECTION, SOLUTION INTRA-ARTICULAR; INTRALESIONAL; INTRAMUSCULAR; INTRAVENOUS; SOFT TISSUE PRN
Status: DISCONTINUED | OUTPATIENT
Start: 2023-09-10 | End: 2023-09-10 | Stop reason: SURG

## 2023-09-10 RX ORDER — SODIUM CHLORIDE, SODIUM LACTATE, POTASSIUM CHLORIDE, CALCIUM CHLORIDE 600; 310; 30; 20 MG/100ML; MG/100ML; MG/100ML; MG/100ML
INJECTION, SOLUTION INTRAVENOUS ONCE
Status: CANCELLED | OUTPATIENT
Start: 2023-09-10 | End: 2023-09-10

## 2023-09-10 RX ORDER — KETOROLAC TROMETHAMINE 30 MG/ML
INJECTION, SOLUTION INTRAMUSCULAR; INTRAVENOUS PRN
Status: DISCONTINUED | OUTPATIENT
Start: 2023-09-10 | End: 2023-09-10 | Stop reason: SURG

## 2023-09-10 RX ORDER — ONDANSETRON 2 MG/ML
4 INJECTION INTRAMUSCULAR; INTRAVENOUS
Status: COMPLETED | OUTPATIENT
Start: 2023-09-10 | End: 2023-09-10

## 2023-09-10 RX ORDER — OXYCODONE HYDROCHLORIDE 10 MG/1
10 TABLET ORAL EVERY 4 HOURS PRN
Status: ACTIVE | OUTPATIENT
Start: 2023-09-10 | End: 2023-09-11

## 2023-09-10 RX ORDER — HYDROMORPHONE HYDROCHLORIDE 1 MG/ML
0.2 INJECTION, SOLUTION INTRAMUSCULAR; INTRAVENOUS; SUBCUTANEOUS
Status: ACTIVE | OUTPATIENT
Start: 2023-09-10 | End: 2023-09-11

## 2023-09-10 RX ORDER — SODIUM CHLORIDE, SODIUM GLUCONATE, SODIUM ACETATE, POTASSIUM CHLORIDE AND MAGNESIUM CHLORIDE 526; 502; 368; 37; 30 MG/100ML; MG/100ML; MG/100ML; MG/100ML; MG/100ML
1500 INJECTION, SOLUTION INTRAVENOUS ONCE
Status: COMPLETED | OUTPATIENT
Start: 2023-09-10 | End: 2023-09-10

## 2023-09-10 RX ORDER — OXYCODONE HCL 5 MG/5 ML
10 SOLUTION, ORAL ORAL
Status: DISCONTINUED | OUTPATIENT
Start: 2023-09-10 | End: 2023-09-10

## 2023-09-10 RX ORDER — ONDANSETRON 2 MG/ML
INJECTION INTRAMUSCULAR; INTRAVENOUS PRN
Status: DISCONTINUED | OUTPATIENT
Start: 2023-09-10 | End: 2023-09-10 | Stop reason: SURG

## 2023-09-10 RX ORDER — DIPHENHYDRAMINE HYDROCHLORIDE 50 MG/ML
25 INJECTION INTRAMUSCULAR; INTRAVENOUS EVERY 6 HOURS PRN
Status: DISCONTINUED | OUTPATIENT
Start: 2023-09-11 | End: 2023-09-12 | Stop reason: HOSPADM

## 2023-09-10 RX ORDER — CEFAZOLIN SODIUM 1 G/3ML
2 INJECTION, POWDER, FOR SOLUTION INTRAMUSCULAR; INTRAVENOUS ONCE
Status: DISCONTINUED | OUTPATIENT
Start: 2023-09-10 | End: 2023-09-10 | Stop reason: HOSPADM

## 2023-09-10 RX ORDER — DIPHENHYDRAMINE HYDROCHLORIDE 50 MG/ML
12.5 INJECTION INTRAMUSCULAR; INTRAVENOUS EVERY 6 HOURS PRN
Status: ACTIVE | OUTPATIENT
Start: 2023-09-10 | End: 2023-09-11

## 2023-09-10 RX ORDER — DOCUSATE SODIUM 100 MG/1
100 CAPSULE, LIQUID FILLED ORAL 2 TIMES DAILY PRN
Status: DISCONTINUED | OUTPATIENT
Start: 2023-09-10 | End: 2023-09-12 | Stop reason: HOSPADM

## 2023-09-10 RX ORDER — OXYCODONE HCL 5 MG/5 ML
5 SOLUTION, ORAL ORAL
Status: DISCONTINUED | OUTPATIENT
Start: 2023-09-10 | End: 2023-09-10

## 2023-09-10 RX ORDER — HALOPERIDOL 5 MG/ML
1 INJECTION INTRAMUSCULAR
Status: DISCONTINUED | OUTPATIENT
Start: 2023-09-10 | End: 2023-09-10

## 2023-09-10 RX ORDER — CITRIC ACID/SODIUM CITRATE 334-500MG
30 SOLUTION, ORAL ORAL ONCE
Status: COMPLETED | OUTPATIENT
Start: 2023-09-10 | End: 2023-09-10

## 2023-09-10 RX ORDER — SODIUM CHLORIDE, SODIUM LACTATE, POTASSIUM CHLORIDE, CALCIUM CHLORIDE 600; 310; 30; 20 MG/100ML; MG/100ML; MG/100ML; MG/100ML
INJECTION, SOLUTION INTRAVENOUS CONTINUOUS
Status: DISCONTINUED | OUTPATIENT
Start: 2023-09-10 | End: 2023-09-12 | Stop reason: HOSPADM

## 2023-09-10 RX ORDER — SIMETHICONE 125 MG
125 TABLET,CHEWABLE ORAL 4 TIMES DAILY PRN
Status: DISCONTINUED | OUTPATIENT
Start: 2023-09-10 | End: 2023-09-12 | Stop reason: HOSPADM

## 2023-09-10 RX ORDER — OXYCODONE HYDROCHLORIDE 5 MG/1
5 TABLET ORAL EVERY 4 HOURS PRN
Status: ACTIVE | OUTPATIENT
Start: 2023-09-10 | End: 2023-09-11

## 2023-09-10 RX ORDER — LABETALOL HYDROCHLORIDE 5 MG/ML
5 INJECTION, SOLUTION INTRAVENOUS
Status: DISCONTINUED | OUTPATIENT
Start: 2023-09-10 | End: 2023-09-10

## 2023-09-10 RX ORDER — SODIUM CHLORIDE, SODIUM LACTATE, POTASSIUM CHLORIDE, CALCIUM CHLORIDE 600; 310; 30; 20 MG/100ML; MG/100ML; MG/100ML; MG/100ML
INJECTION, SOLUTION INTRAVENOUS PRN
Status: DISCONTINUED | OUTPATIENT
Start: 2023-09-10 | End: 2023-09-12 | Stop reason: HOSPADM

## 2023-09-10 RX ORDER — MEPERIDINE HYDROCHLORIDE 25 MG/ML
12.5 INJECTION INTRAMUSCULAR; INTRAVENOUS; SUBCUTANEOUS
Status: DISCONTINUED | OUTPATIENT
Start: 2023-09-10 | End: 2023-09-10

## 2023-09-10 RX ORDER — DIPHENHYDRAMINE HCL 25 MG
25 TABLET ORAL EVERY 6 HOURS PRN
Status: DISCONTINUED | OUTPATIENT
Start: 2023-09-11 | End: 2023-09-12 | Stop reason: HOSPADM

## 2023-09-10 RX ORDER — IBUPROFEN 800 MG/1
800 TABLET ORAL EVERY 8 HOURS PRN
Status: DISCONTINUED | OUTPATIENT
Start: 2023-09-14 | End: 2023-09-12 | Stop reason: HOSPADM

## 2023-09-10 RX ORDER — SCOLOPAMINE TRANSDERMAL SYSTEM 1 MG/1
PATCH, EXTENDED RELEASE TRANSDERMAL PRN
Status: DISCONTINUED | OUTPATIENT
Start: 2023-09-10 | End: 2023-09-10 | Stop reason: SURG

## 2023-09-10 RX ORDER — HYDROMORPHONE HYDROCHLORIDE 1 MG/ML
0.1 INJECTION, SOLUTION INTRAMUSCULAR; INTRAVENOUS; SUBCUTANEOUS
Status: DISCONTINUED | OUTPATIENT
Start: 2023-09-10 | End: 2023-09-10

## 2023-09-10 RX ORDER — HYDRALAZINE HYDROCHLORIDE 20 MG/ML
5 INJECTION INTRAMUSCULAR; INTRAVENOUS
Status: DISCONTINUED | OUTPATIENT
Start: 2023-09-10 | End: 2023-09-10

## 2023-09-10 RX ORDER — DIPHENHYDRAMINE HYDROCHLORIDE 50 MG/ML
25 INJECTION INTRAMUSCULAR; INTRAVENOUS EVERY 6 HOURS PRN
Status: ACTIVE | OUTPATIENT
Start: 2023-09-10 | End: 2023-09-11

## 2023-09-10 RX ORDER — EPHEDRINE SULFATE 50 MG/ML
10 INJECTION, SOLUTION INTRAVENOUS
Status: ACTIVE | OUTPATIENT
Start: 2023-09-10 | End: 2023-09-11

## 2023-09-10 RX ORDER — CEFAZOLIN SODIUM 1 G/3ML
INJECTION, POWDER, FOR SOLUTION INTRAMUSCULAR; INTRAVENOUS PRN
Status: DISCONTINUED | OUTPATIENT
Start: 2023-09-10 | End: 2023-09-10 | Stop reason: SURG

## 2023-09-10 RX ORDER — ONDANSETRON 2 MG/ML
4 INJECTION INTRAMUSCULAR; INTRAVENOUS EVERY 6 HOURS PRN
Status: DISCONTINUED | OUTPATIENT
Start: 2023-09-11 | End: 2023-09-12 | Stop reason: HOSPADM

## 2023-09-10 RX ORDER — ACETAMINOPHEN 500 MG
1000 TABLET ORAL EVERY 6 HOURS
Status: DISCONTINUED | OUTPATIENT
Start: 2023-09-11 | End: 2023-09-12 | Stop reason: HOSPADM

## 2023-09-10 RX ORDER — SODIUM CHLORIDE, SODIUM GLUCONATE, SODIUM ACETATE, POTASSIUM CHLORIDE AND MAGNESIUM CHLORIDE 526; 502; 368; 37; 30 MG/100ML; MG/100ML; MG/100ML; MG/100ML; MG/100ML
INJECTION, SOLUTION INTRAVENOUS
Status: DISCONTINUED | OUTPATIENT
Start: 2023-09-10 | End: 2023-09-10 | Stop reason: SURG

## 2023-09-10 RX ORDER — BUPIVACAINE HYDROCHLORIDE 7.5 MG/ML
INJECTION, SOLUTION INTRASPINAL
Status: COMPLETED | OUTPATIENT
Start: 2023-09-10 | End: 2023-09-10

## 2023-09-10 RX ORDER — ONDANSETRON 4 MG/1
4 TABLET, ORALLY DISINTEGRATING ORAL EVERY 6 HOURS PRN
Status: DISCONTINUED | OUTPATIENT
Start: 2023-09-11 | End: 2023-09-12 | Stop reason: HOSPADM

## 2023-09-10 RX ORDER — OXYTOCIN 10 [USP'U]/ML
10 INJECTION, SOLUTION INTRAMUSCULAR; INTRAVENOUS
Status: CANCELLED | OUTPATIENT
Start: 2023-09-10

## 2023-09-10 RX ORDER — ASPIRIN 81 MG/1
81 TABLET, CHEWABLE ORAL DAILY
Status: ON HOLD | COMMUNITY
End: 2023-09-12

## 2023-09-10 RX ORDER — NIFEDIPINE 10 MG/1
10 CAPSULE ORAL
Status: DISCONTINUED | OUTPATIENT
Start: 2023-09-10 | End: 2023-09-12 | Stop reason: HOSPADM

## 2023-09-10 RX ORDER — HYDROMORPHONE HYDROCHLORIDE 1 MG/ML
0.4 INJECTION, SOLUTION INTRAMUSCULAR; INTRAVENOUS; SUBCUTANEOUS
Status: DISCONTINUED | OUTPATIENT
Start: 2023-09-10 | End: 2023-09-10

## 2023-09-10 RX ORDER — OXYTOCIN 10 [USP'U]/ML
INJECTION, SOLUTION INTRAMUSCULAR; INTRAVENOUS PRN
Status: DISCONTINUED | OUTPATIENT
Start: 2023-09-10 | End: 2023-09-10 | Stop reason: SURG

## 2023-09-10 RX ORDER — DIPHENHYDRAMINE HYDROCHLORIDE 50 MG/ML
12.5 INJECTION INTRAMUSCULAR; INTRAVENOUS
Status: DISCONTINUED | OUTPATIENT
Start: 2023-09-10 | End: 2023-09-10

## 2023-09-10 RX ORDER — ONDANSETRON 2 MG/ML
4 INJECTION INTRAMUSCULAR; INTRAVENOUS EVERY 6 HOURS PRN
Status: ACTIVE | OUTPATIENT
Start: 2023-09-10 | End: 2023-09-11

## 2023-09-10 RX ORDER — IBUPROFEN 800 MG/1
800 TABLET ORAL EVERY 8 HOURS
Status: DISCONTINUED | OUTPATIENT
Start: 2023-09-11 | End: 2023-09-12 | Stop reason: HOSPADM

## 2023-09-10 RX ORDER — KETOROLAC TROMETHAMINE 30 MG/ML
30 INJECTION, SOLUTION INTRAMUSCULAR; INTRAVENOUS EVERY 6 HOURS
Status: COMPLETED | OUTPATIENT
Start: 2023-09-10 | End: 2023-09-11

## 2023-09-10 RX ORDER — ACETAMINOPHEN 500 MG
1000 TABLET ORAL EVERY 6 HOURS PRN
Status: DISCONTINUED | OUTPATIENT
Start: 2023-09-14 | End: 2023-09-12 | Stop reason: HOSPADM

## 2023-09-10 RX ORDER — METOCLOPRAMIDE HYDROCHLORIDE 5 MG/ML
10 INJECTION INTRAMUSCULAR; INTRAVENOUS ONCE
Status: COMPLETED | OUTPATIENT
Start: 2023-09-10 | End: 2023-09-10

## 2023-09-10 RX ORDER — HYDROMORPHONE HYDROCHLORIDE 1 MG/ML
0.4 INJECTION, SOLUTION INTRAMUSCULAR; INTRAVENOUS; SUBCUTANEOUS
Status: ACTIVE | OUTPATIENT
Start: 2023-09-10 | End: 2023-09-11

## 2023-09-10 RX ORDER — OXYCODONE HYDROCHLORIDE 10 MG/1
10 TABLET ORAL EVERY 4 HOURS PRN
Status: DISCONTINUED | OUTPATIENT
Start: 2023-09-11 | End: 2023-09-12 | Stop reason: HOSPADM

## 2023-09-10 RX ORDER — OXYCODONE HYDROCHLORIDE 5 MG/1
5 TABLET ORAL EVERY 4 HOURS PRN
Status: DISCONTINUED | OUTPATIENT
Start: 2023-09-11 | End: 2023-09-12 | Stop reason: HOSPADM

## 2023-09-10 RX ORDER — MORPHINE SULFATE 0.5 MG/ML
INJECTION, SOLUTION EPIDURAL; INTRATHECAL; INTRAVENOUS
Status: COMPLETED | OUTPATIENT
Start: 2023-09-10 | End: 2023-09-10

## 2023-09-10 RX ORDER — HYDROMORPHONE HYDROCHLORIDE 1 MG/ML
0.2 INJECTION, SOLUTION INTRAMUSCULAR; INTRAVENOUS; SUBCUTANEOUS
Status: DISCONTINUED | OUTPATIENT
Start: 2023-09-10 | End: 2023-09-10

## 2023-09-10 RX ORDER — ACETAMINOPHEN 500 MG
1000 TABLET ORAL EVERY 6 HOURS
Status: DISPENSED | OUTPATIENT
Start: 2023-09-10 | End: 2023-09-11

## 2023-09-10 RX ADMIN — SCOPOLAMINE 1 PATCH: 1.5 PATCH, EXTENDED RELEASE TRANSDERMAL at 12:37

## 2023-09-10 RX ADMIN — ONDANSETRON 4 MG: 2 INJECTION INTRAMUSCULAR; INTRAVENOUS at 11:57

## 2023-09-10 RX ADMIN — FENTANYL CITRATE 15 MCG: 50 INJECTION, SOLUTION INTRAMUSCULAR; INTRAVENOUS at 12:04

## 2023-09-10 RX ADMIN — KETOROLAC TROMETHAMINE 30 MG: 30 INJECTION, SOLUTION INTRAMUSCULAR; INTRAVENOUS at 19:30

## 2023-09-10 RX ADMIN — ACETAMINOPHEN 1000 MG: 500 TABLET ORAL at 18:23

## 2023-09-10 RX ADMIN — ONDANSETRON 4 MG: 2 INJECTION INTRAMUSCULAR; INTRAVENOUS at 16:58

## 2023-09-10 RX ADMIN — SODIUM CHLORIDE, SODIUM GLUCONATE, SODIUM ACETATE, POTASSIUM CHLORIDE AND MAGNESIUM CHLORIDE 1500 ML: 526; 502; 368; 37; 30 INJECTION, SOLUTION INTRAVENOUS at 10:57

## 2023-09-10 RX ADMIN — SODIUM CHLORIDE, SODIUM GLUCONATE, SODIUM ACETATE, POTASSIUM CHLORIDE AND MAGNESIUM CHLORIDE: 526; 502; 368; 37; 30 INJECTION, SOLUTION INTRAVENOUS at 11:57

## 2023-09-10 RX ADMIN — FAMOTIDINE 20 MG: 10 INJECTION, SOLUTION INTRAVENOUS at 11:36

## 2023-09-10 RX ADMIN — SODIUM CHLORIDE, POTASSIUM CHLORIDE, SODIUM LACTATE AND CALCIUM CHLORIDE: 600; 310; 30; 20 INJECTION, SOLUTION INTRAVENOUS at 19:00

## 2023-09-10 RX ADMIN — KETOROLAC TROMETHAMINE 30 MG: 30 INJECTION, SOLUTION INTRAMUSCULAR; INTRAVENOUS at 12:50

## 2023-09-10 RX ADMIN — CEFAZOLIN 2 G: 1 INJECTION, POWDER, FOR SOLUTION INTRAMUSCULAR; INTRAVENOUS at 12:07

## 2023-09-10 RX ADMIN — BUPIVACAINE HYDROCHLORIDE IN DEXTROSE 1.6 ML: 7.5 INJECTION, SOLUTION SUBARACHNOID at 12:04

## 2023-09-10 RX ADMIN — OXYTOCIN 500 ML: 10 INJECTION, SOLUTION INTRAMUSCULAR; INTRAVENOUS at 12:50

## 2023-09-10 RX ADMIN — MORPHINE SULFATE 150 MCG: 0.5 INJECTION, SOLUTION EPIDURAL; INTRATHECAL; INTRAVENOUS at 12:04

## 2023-09-10 RX ADMIN — SODIUM CITRATE AND CITRIC ACID MONOHYDRATE 30 ML: 500; 334 SOLUTION ORAL at 11:36

## 2023-09-10 RX ADMIN — DEXAMETHASONE SODIUM PHOSPHATE 8 MG: 4 INJECTION INTRA-ARTICULAR; INTRALESIONAL; INTRAMUSCULAR; INTRAVENOUS; SOFT TISSUE at 12:07

## 2023-09-10 RX ADMIN — OXYTOCIN 20 UNITS: 10 INJECTION, SOLUTION INTRAMUSCULAR; INTRAVENOUS at 12:30

## 2023-09-10 RX ADMIN — SODIUM CHLORIDE 0.4 MCG/KG/MIN: 900 INJECTION INTRAVENOUS at 12:07

## 2023-09-10 RX ADMIN — METOCLOPRAMIDE 10 MG: 5 INJECTION, SOLUTION INTRAMUSCULAR; INTRAVENOUS at 11:36

## 2023-09-10 RX ADMIN — OXYTOCIN 125 ML/HR: 10 INJECTION, SOLUTION INTRAMUSCULAR; INTRAVENOUS at 14:49

## 2023-09-10 ASSESSMENT — PAIN SCALES - GENERAL: PAIN_LEVEL: 0

## 2023-09-10 ASSESSMENT — PATIENT HEALTH QUESTIONNAIRE - PHQ9
1. LITTLE INTEREST OR PLEASURE IN DOING THINGS: NOT AT ALL
2. FEELING DOWN, DEPRESSED, IRRITABLE, OR HOPELESS: NOT AT ALL
SUM OF ALL RESPONSES TO PHQ9 QUESTIONS 1 AND 2: 0

## 2023-09-10 ASSESSMENT — PAIN DESCRIPTION - PAIN TYPE: TYPE: ACUTE PAIN

## 2023-09-10 ASSESSMENT — FIBROSIS 4 INDEX: FIB4 SCORE: 0.81

## 2023-09-10 NOTE — PROGRESS NOTES
0958: Pt. Arrival to triage room 4. Met patient. Instructed to wipe body with CHG wipes before changing into hospital gown.    1043: Call to provider; Verbal order to place c/s admission order set. Reported history of pre-e and current /89. Order to add P/C ratio and CMP to admission labs.    1156: Arrival to OR 2 from Triage room 4.    1229: Delivery of baby girl Anabella Damico, Apgars 8/8, 6lbs. 0.3oz.    1303: Arrival to PACU [place] 2 from OR 2.    1417: Departure from PACU 2.    1424: Arrival to postpartum room 301.    1444: ID bands on parents and infant verified with postpartum RN [place]. Report given to [place].

## 2023-09-10 NOTE — ANESTHESIA TIME REPORT
Anesthesia Start and Stop Event Times     Date Time Event    9/10/2023 1104 Ready for Procedure     1157 Anesthesia Start     1307 Anesthesia Stop        Responsible Staff  09/10/23    Name Role Begin End    Henry Boss M.D. Anesth 1157 1307        Overtime Reason:  no overtime (within assigned shift)    Comments:

## 2023-09-10 NOTE — ANESTHESIA POSTPROCEDURE EVALUATION
Patient: Komal Hall    Procedure Summary     Date: 09/10/23 Room / Location: [facility] OR 02 / SURGERY LABOR [facility] DELIVERY    Anesthesia Start: 1157 Anesthesia Stop: 1307    Procedure: REPEAT  SECTION (Abdomen) Diagnosis:       Status post repeat low transverse  section      (37.4 WEEKS REPEAT  GHTN)    Surgeons: Brooke Gillette M.D. Responsible Provider: Henry Boss M.D.    Anesthesia Type: spinal ASA Status: 3          Final Anesthesia Type: spinal  Last vitals  BP   Blood Pressure: 128/79    Temp   36.1 °C (96.9 °F)    Pulse   80   Resp   16    SpO2   95 %      Anesthesia Post Evaluation    Patient location during evaluation: floor  Patient participation: complete - patient participated  Level of consciousness: awake and alert  Pain score: 0    Airway patency: patent  Anesthetic complications: no  Cardiovascular status: hemodynamically stable  Respiratory status: acceptable  Hydration status: euvolemic  Comments:       PONV: none    patient able to participate, but full recovery from regional anesthesia has not occurred and is not expected within the stipulated timeframe for the completion of the evaluation      There were no known notable events for this encounter.     Nurse Pain Score: 0 (NPRS)

## 2023-09-10 NOTE — ANESTHESIA PREPROCEDURE EVALUATION
Case: [id number] Date/Time: 09/10/23 1200    Procedure: REPEAT  SECTION (Abdomen)    Anesthesia type: Spinal    Pre-op diagnosis: 37.4 WEEKS REPEAT  [facility]    Location: D OR  / SURGERY LABOR AND DELIVERY    Surgeons: Brooke Gillette M.D.      Patient presents for , Hx of Pre-E     Anesthesia: No problems with Anesthesia.  Resp: No asthma or COPD history.  Cards: No known cardiac abnormalities. + Pre-E  Heme: No known bleeding disorders, platelets reviewed.    Risks of procedure discussed including: infection, bleeding, nerve damage, and post-dural puncture headache.       Relevant Problems   Other   (positive) History of  delivery   (positive) History of pregnancy induced hypertension       Physical Exam    Airway   Mallampati: II  TM distance: >3 FB  Neck ROM: full       Cardiovascular - normal exam  Rhythm: regular  Rate: normal  (-) murmur     Dental - normal exam           Pulmonary - normal exam  Breath sounds clear to auscultation     Abdominal    Neurological - normal exam                 Anesthesia Plan    ASA 3   ASA physical status 3 criteria: morbid obesity - BMI greater than or equal to 40    Plan - spinal   Neuraxial block will be primary anesthetic                Postoperative Plan: Postoperative administration of opioids is intended.    Pertinent diagnostic labs and testing reviewed    Informed Consent:    Anesthetic plan and risks discussed with patient.

## 2023-09-10 NOTE — OP REPORT
PREOPERATIVE DIAGNOSIS:   IUP @ 37+4/7 weeks  Gestational htn  History of previous  delivery, desires repeat    POSTOPERATIVE DIAGNOSIS:  Same    PROCEDURE: Repeat low Transverse  Section via Pfannensteil    SURGEON: Brooke Gillette MD    ASSISTANT: Reilly Pérez MD    ANESTHESIA: spinal    COMPLICATIONS: none    EBL:  500 cc    FLUIDS: 1000 cc LR    URINE OUTPUT: 800 cc    INDICATIONS: 28-year-old  2 para 0-1-0-1 at 37-4/7 weeks with history of previous  delivery, now with gestational hypertension.  Patient desires repeat .    FINDINGS: Viable female infant in cephalic presentation with clear fluid, apgars 8 and 8, weight 2730 grams. Normal uterus, tubes, ovaries.     PROCEDURE IN DETAIL: The patient was taken to the operating room where  spinal anesthesia was found to be adequate.  She was then prepped and draped in the normal sterile fashion in the dorsal supine position with a leftward hip tilt.  A Pfannenstiel skin incision was then made with the scalpel and carried through to the underlying layer of fascia, which was incised in the midline and the incision was extended laterally with the Wilson scissors.  The superior aspect of the fascial incision was then grasped with Kocher clamps, elevated, and the underlying rectus muscles dissected off bluntly and sharply.  Attention was then turned to the inferior aspect of this incision which, in a similar fashion, was grasped, tented up with Kocher clamps, and the rectus muscle was dissected off bluntly and sharply.  The rectus muscles were then  in the midline, and the peritoneum was identified, tented up, and entered sharply with the Metzenbaum scissors.  The peritoneal incision was then extended superiorly and inferiorly with good visualization of the bladder.  The bladder blade was then inserted and the vesicouterine peritoneum identified, grasped with pickups, and entered sharply with the Metzenbaum scissors.  This  incision was then extended laterally and the bladder flap was created digitally.  The bladder blade was then reinserted and the lower uterine segment incised in a transverse fashion with the scalpel.  The uterine incision was then extended in a cephalo-caudad fashion with fingers.  The bladder blade was removed and the infant's head was delivered atraumatically.  The remainder of the infant was delivered without difficulty.  The infant was stimulated and the nose and mouth were bulb suctioned on the field.  The cord was clamped and cut after approximately 30 seconds.  The infant was then handed off to the awaiting attendant.  The placenta was then removed manually, the uterus exteriorized, and cleared of all clot and debris.  The uterine incision was repaired with 1-0 chromic in a running, locked fashion.  Excellent hemostasis was noted.  The uterus was returned to the abdomen and the gutters were cleared of all clot and debris.  The fascia was approximated with 0 Vicryl in a running fashion.  The subcuticular fat was irrigated.  Dave's fascia was closed with interrupted sutures of 2-0 Vicryl.  The skin was closed with 4-0 Monocryl in a subcuticular fashion.  The patient tolerated the procedure well.  Sponge lap and needle counts were correct x3.  The patient was taken to the recovery room in stable condition.

## 2023-09-10 NOTE — H&P
OB H&P:    CC: Scheduled C/S at 12:00    HPI:  Ms. Komal Hall is a 28 y.o.  @ 37w4d by 9w US presents for scheduled C/S due to elevated blood pressure and history of  preeclampsia with severe features and CS delivery. Pt. also has been diagnosed with gestational hypertension during this pregnancy.    Contractions: Yes   Loss of fluid: No   Vaginal bleeding: No   Fetal movement: present    PNC with [facility]    PNL:  Blood Type O+, Rubella immune, HIV neg, TrepAb neg, HBsAg NR, GC/CT in process  1 HR GTT: 135  GBS negative    ROS:  Const: denies fevers, general concerns  CV/resp: reports no concerns  GI: denies abd pain, GI concerns  : see HPI  Neuro: denies HA/vision changes    OB History    Para Term  AB Living   2 1   1   1   SAB IAB Ectopic Molar Multiple Live Births           0 1      # Outcome Date GA Lbr Justin/2nd Weight Sex Delivery Anes PTL Lv   2 Current            1  19 34w5d  2.135 kg (4 lb 11.3 oz) M CS-LTranv EPI Y GA      Complications: Failure to Progress in First Stage       GYN: denies STIs, no cervical procedures    Past Medical History:   Diagnosis Date    Gestational hypertension, third trimester 2019    Postpartum depression 2019    Preeclampsia        Past Surgical History:   Procedure Laterality Date    PILONIDAL CYST EXCISION  2020    Procedure: EXCISION, PILONIDAL CYST;  Surgeon: Carmella Schmitt M.D.;  Location: SURGERY [facility];  Service: General    PRIMARY C SECTION  2019    Procedure: PRIMARY C SECTION;  Surgeon: Zulema Jones M.D.;  Location: LABOR AND DELIVERY;  Service: Obstetrics       No current facility-administered medications on file prior to encounter.     Current Outpatient Medications on File Prior to Encounter   Medication Sig Dispense Refill    Prenatal MV-Min-Fe Fum-FA-DHA (PRENATAL 1 PO) Take 1 Capsule by mouth every day.      aspirin (ASA) 81 MG Chew Tab chewable tablet  "Chew 81 mg every day.      COVID-19mRNA Bival Vacc Pfizer (PFIZER COVID-19 BIVALENT) 30 MCG/0.3ML Suspension injection Inject  into the shoulder, thigh, or buttocks. 0.3 mL 0    influenza vaccine quad (FLUZONE QUADRIVALENT) 0.5 ML Suspension Prefilled Syringe injection Inject  into the shoulder, thigh, or buttocks. 0.5 mL 0       Family History   Problem Relation Age of Onset    No Known Problems Mother     No Known Problems Father     No Known Problems Maternal Grandmother     No Known Problems Maternal Grandfather     No Known Problems Paternal Grandmother     No Known Problems Paternal Grandfather        Social History     Tobacco Use    Smoking status: Never    Smokeless tobacco: Never   Vaping Use    Vaping Use: Never used   Substance Use Topics    Alcohol use: No    Drug use: No         PE:  Vitals:    09/10/23 1015   BP: 133/89   Pulse: 74   Resp: 16   Temp: 36.6 °C (97.9 °F)   TempSrc: Temporal   SpO2: 98%   Weight: 110 kg (243 lb)   Height: 1.549 m (5' 1\")       GEN: AAO, NAD  HEENT: normocephalic, atraumatic, EOMI  CV: rrr, no m/r/g  Resp: CTAB, no w/r/r  Abd: soft, gravid, NT, EFW 2438g (42%)  Ext: NT, no peripheral edema  Derm: no visible lesions or rashes  Neuro: grossly intact    SVE: Not performed  FHT: Baseline 140s/minimal variability/+accels/- decels  Maikol: Contractions q 8 mins    A/P: 28 y.o.  @ 37w4d by 9w US presents scheduled C/S for history of  preeclampsia with severe features and CS delivery    #SIUP @  37w4d by 9w US  -adequate PNL with RW     - Admit to L&D, will be taken to OR for repeat C section   - Risks of  section discussed with patient, including, but not limited to, bleeding, infection, damage to other organs such as bowel, bladder, ureters, and nerves, need for reoperation, injury to fetus, need for blood transfusion if indicated. Patient understands all risks and all questions answered. Consents to be signed.  - Patient will be NPO   - Pt was started on IV " Cefazolin 2 g and LR @ 125ml/h  - Anesthesia notified     #Fetal status   - FHT: cat 1    #GBS status  -GBS: negative     #Rubella immune, HIV neg, TrepAb neg, HBsAg NR, GC/CT neg/neg    #healthcare maintenance   -TdaP 2023    Reilly Pérez MD, MPH  PGY-1, [facility]       I saw and examined the patient and discussed the management with the resident. I reviewed the resident's note and agree with the documented findings and plan of care.    Additional attending comments:  28-year-old -1-0-1 at 37-4/7 weeks with gestational hypertension and history of previous  x1, desires repeat.    Discussed with the patient indications for  delivery. The patient voiced understanding of indications for  section at this time.    Discussed with the patient the risks of  delivery. The risks include bleeding, infection, transfusion, emergency hysterectomy to control bleeding, damage to surrounding organs (bowel, bladder, ureters, nerves, vessels), need for repair or future surgery, fetal injury, unexpected pathology, anesthesia risks, and rarely death.  I also discussed with the patient the risk of wound infection, wound breakdown, and scarring. We discussed that these risks are greater in people that have diabetes or obesity.    The patient had the opportunity to ask questions regarding the procedure. All questions answered to the patient's satisfaction. Plan to proceed with  delivery.

## 2023-09-10 NOTE — ANESTHESIA PROCEDURE NOTES
Spinal Block    Date/Time: 9/10/2023 12:04 PM    Performed by: Henry Boss M.D.  Authorized by: Henry Boss M.D.    Start Time:  9/10/2023 12:04 PM  End Time:  9/10/2023 12:05 PM  Reason for Block: primary anesthetic    patient identified, IV checked, site marked, risks and benefits discussed, surgical consent, monitors and equipment checked, pre-op evaluation and timeout performed    Patient Position:  Sitting  Prep: ChloraPrep, patient draped and sterile technique    Monitoring:  Blood pressure, continuous pulse oximetry and heart rate  Approach:  Midline  Location:  L3-4  Injection Technique:  Single-shot  Skin infiltration:  Lidocaine  Strength:  1%  Dose:  3ml  Needle Type:  Quincke  Needle Gauge:  25 G  CSF flowing pre/post injection:  Yes  Sensory Level:  T4   Single pass, needle hubbed in introducer, +CSF flow, unable to aspirate. Injected medication with flow Visualized before and after injection.

## 2023-09-11 ENCOUNTER — TELEPHONE (OUTPATIENT)
Dept: OBGYN | Facility: CLINIC | Age: 28
End: 2023-09-11
Payer: COMMERCIAL

## 2023-09-11 LAB
ERYTHROCYTE [DISTWIDTH] IN BLOOD BY AUTOMATED COUNT: 47.3 FL (ref 35.9–50)
HCT VFR BLD AUTO: 35.9 % (ref 37–47)
HGB BLD-MCNC: 11.7 G/DL (ref 12–16)
MCH RBC QN AUTO: 28.1 PG (ref 27–33)
MCHC RBC AUTO-ENTMCNC: 32.6 G/DL (ref 32.2–35.5)
MCV RBC AUTO: 86.1 FL (ref 81.4–97.8)
PLATELET # BLD AUTO: 185 K/UL (ref 164–446)
PMV BLD AUTO: 13 FL (ref 9–12.9)
RBC # BLD AUTO: 4.17 M/UL (ref 4.2–5.4)
WBC # BLD AUTO: 13.5 K/UL (ref 4.8–10.8)

## 2023-09-11 PROCEDURE — 36415 COLL VENOUS BLD VENIPUNCTURE: CPT

## 2023-09-11 PROCEDURE — A9270 NON-COVERED ITEM OR SERVICE: HCPCS | Performed by: INTERNAL MEDICINE

## 2023-09-11 PROCEDURE — 700102 HCHG RX REV CODE 250 W/ 637 OVERRIDE(OP): Performed by: OBSTETRICS & GYNECOLOGY

## 2023-09-11 PROCEDURE — 700102 HCHG RX REV CODE 250 W/ 637 OVERRIDE(OP): Performed by: INTERNAL MEDICINE

## 2023-09-11 PROCEDURE — 85027 COMPLETE CBC AUTOMATED: CPT

## 2023-09-11 PROCEDURE — A9270 NON-COVERED ITEM OR SERVICE: HCPCS | Performed by: OBSTETRICS & GYNECOLOGY

## 2023-09-11 PROCEDURE — 700111 HCHG RX REV CODE 636 W/ 250 OVERRIDE (IP): Mod: JZ | Performed by: INTERNAL MEDICINE

## 2023-09-11 PROCEDURE — 700102 HCHG RX REV CODE 250 W/ 637 OVERRIDE(OP)

## 2023-09-11 PROCEDURE — 770002 HCHG ROOM/CARE - OB PRIVATE (112)

## 2023-09-11 PROCEDURE — A9270 NON-COVERED ITEM OR SERVICE: HCPCS

## 2023-09-11 RX ORDER — LABETALOL 100 MG/1
100 TABLET, FILM COATED ORAL TWICE DAILY
Status: DISCONTINUED | OUTPATIENT
Start: 2023-09-11 | End: 2023-09-12 | Stop reason: HOSPADM

## 2023-09-11 RX ADMIN — ACETAMINOPHEN 1000 MG: 500 TABLET ORAL at 15:11

## 2023-09-11 RX ADMIN — ACETAMINOPHEN 1000 MG: 500 TABLET ORAL at 08:45

## 2023-09-11 RX ADMIN — IBUPROFEN 800 MG: 800 TABLET, FILM COATED ORAL at 22:18

## 2023-09-11 RX ADMIN — KETOROLAC TROMETHAMINE 30 MG: 30 INJECTION, SOLUTION INTRAMUSCULAR; INTRAVENOUS at 15:12

## 2023-09-11 RX ADMIN — ACETAMINOPHEN 1000 MG: 500 TABLET ORAL at 21:15

## 2023-09-11 RX ADMIN — ACETAMINOPHEN 1000 MG: 500 TABLET ORAL at 02:09

## 2023-09-11 RX ADMIN — LABETALOL HYDROCHLORIDE 100 MG: 100 TABLET, FILM COATED ORAL at 07:35

## 2023-09-11 RX ADMIN — KETOROLAC TROMETHAMINE 30 MG: 30 INJECTION, SOLUTION INTRAMUSCULAR; INTRAVENOUS at 07:35

## 2023-09-11 RX ADMIN — KETOROLAC TROMETHAMINE 30 MG: 30 INJECTION, SOLUTION INTRAMUSCULAR; INTRAVENOUS at 02:09

## 2023-09-11 ASSESSMENT — PAIN DESCRIPTION - PAIN TYPE
TYPE: ACUTE PAIN
TYPE: ACUTE PAIN
TYPE: SURGICAL PAIN;ACUTE PAIN
TYPE: SURGICAL PAIN

## 2023-09-11 NOTE — CARE PLAN
The patient is Stable - Low risk of patient condition declining or worsening    Shift Goals  Clinical Goals: Normal VS, pain management  Patient Goals: healthy mom and baby  Family Goals: appropriate support from spouse    Progress made toward(s) clinical / shift goals:  Patient reports adequate pain control with current medications, discussed plan for pain management and additional medications available if needed. Will continue to monitor BP every 4 hours and other vital signs every hour as ordered.    Patient is not progressing towards the following goals:

## 2023-09-11 NOTE — CARE PLAN
The patient is Watcher - Medium risk of patient condition declining or worsening    Shift Goals  Clinical Goals: maintain BPs WDL  Patient Goals: healthy mom and baby  Family Goals: appropriate support from spouse    Progress made toward(s) clinical / shift goals:    Problem: Psychosocial - Postpartum  Goal: Patient will verbalize and demonstrate effective bonding and parenting behavior  Outcome: Progressing patient skin to skin with baby this afternoon and breastfeeding baby.     Problem: Altered Physiologic Condition  Goal: Patient physiologically stable as evidenced by normal lochia, palpable uterine involution and vitals within normal limits  Outcome: Progressing lochia wnl.       Patient is not progressing towards the following goals:

## 2023-09-11 NOTE — CARE PLAN
The patient is Stable - Low risk of patient condition declining or worsening    Shift Goals  Clinical Goals: pain control, lochia remain WNL    Progress made toward(s) clinical / shift goals:  Patient pain well controlled with rest, repositioning, and scheduled medication. Ambulating and voiding without difficulty. Fundus firm and palpable, lochia scant to light rubra.    Patient is not progressing towards the following goals:

## 2023-09-11 NOTE — DISCHARGE PLANNING
Discharge Planning Assessment Post Partum    Reason for Referral: History of post partum depression  Address: [address] [place] [address] [place] [address]  Phone: [phone]  Type of Living Situation: living with FOB and son  Mom Diagnosis: Pregnancy,   Baby Diagnosis: [place]-37.4 weeks  Primary Language: English    Name of Baby: Anabella Harden (: 9/10/23)  Father of the Baby: Luis Harden  Involved in baby’s care? Yes  Contact Information: [phone]    Prenatal Care: Yes  Mom's PCP:  Dr. Lambert  PCP for new baby: Dr. Degroot    Support System: FOB and family  Coping/Bonding between mother & baby: Yes  Source of Feeding: breast feeding  Supplies for Infant: prepared for infant; denies any needs    Mom's Insurance: [facility]  Baby Covered on Insurance:Yes  Mother Employed/School:   Other children in the home/names & ages: 4 year old son-Luis    Financial Hardship/Income: No   Mom's Mental status: alert and oriented  Services used prior to admit: considering applying for Medicaid    CPS History: No  Psychiatric History: history of post partum depression.  Discussed with mother and provided a list of counseling and support group resources specializing in post partum depression  Domestic Violence History: No  Drug/ETOH History: No    Resources Provided: post partum support and counseling resources, children and family resource list, diaper bank assistance resources, and list of WIC clinics provided to mother  Referrals Made: diaper bank referral provided     Clearance for Discharge: Infant is cleared to discharge home with parents once medically cleared

## 2023-09-11 NOTE — PROGRESS NOTES
"POD # 1 S/P REPEAT      SUBJECTIVE: Pain well controlled.  Minimal vaginal bleeding.  Breast feeding.  Tolerating clears, had some emesis last night at 6 pm.  Passing flatus.  No BM.  Ambulating without lightheadedness or dizziness. No depression or anxiety. Has not urinated yet - ibrahim removed about 1.5 hours ago.    OBJECTIVE:  /83   Pulse 73   Temp 37 °C (98.6 °F) (Temporal)   Resp 17   Ht 1.549 m (5' 1\")   Wt 110 kg (243 lb)   SpO2 97%      GENERAL: Alert, in no apparent distress  PSYCHIATRIC: Appropriate affect, intact insight and judgement.  LUNGS: CTA  CV:RRR  ABDOMEN: Soft, nontender, nondistended.  No rebound or guarding.  Fundus firm, NT, at umbilicus.  Dressing dry and intact.  EXTREMITIES: 2+ edema, calves NT    Recent Labs     /  1629 09/10/23  1030 23  0521   WBC 9.2 8.8 13.5*   RBC 4.62 4.70 4.17*   HEMOGLOBIN 13.0 13.2 11.7*   HEMATOCRIT 39.3 39.5 35.9*   MCV 85.1 84.0 86.1   MCH 28.1 28.1 28.1   RDW 46.6 46.4 47.3   PLATELETCT 169 174 185   MPV 12.6 13.5* 13.0*   NEUTSPOLYS  --  75.90*  --    LYMPHOCYTES  --  15.80*  --    MONOCYTES  --  6.60  --    EOSINOPHILS  --  0.90  --    BASOPHILS  --  0.30  --         ASSESSMENT: POD # 1 S/P REPEAT     GESTATIONAL HTN - BPs mildly elevated 140s/80-90s.  Will start labetalol 100 mg po bid      PLAN: Routine post op care.  Contraception - desires minipill  "

## 2023-09-11 NOTE — PROGRESS NOTES
0700- Received report from LENIN Ca. Assumed care of pt. Declines needs at this time. 12 hour chart, MAR and orders reviewed.     0730- Assessment complete. Fundus firm and palpable, lochia scant to light rubra. Pain management and interventions discussed with pt. Patient OK with scheduled medication at this time. FOB at bedside. Patient states breastfeeding is well at this time. Incision CDI. Ambulating and voiding without difficulty. POC discussed. All questions and concerns discussed at this time. No further concerns.

## 2023-09-11 NOTE — LACTATION NOTE
This note was copied from a baby's chart.  Initial LC visit, mother reports infant is breastfeeding well, mother is beginning to feel some signs of breast filling this morning. Reviewed milk onset, hunger cues, cluster feeding, frequency/duration of feedings, and stool transitions. Mother reports that she feels latching is easier for her on right side compared to left, encouraged to call for RN and/or LC assistance when ready for feeding if she would like assistance on left side. Plan to continue cue based breastfeeding at least 8 or more times per 24 hours. Provided outpatient lactation resources and routine referral placed to [facility]. Parents deny questions/concerns.

## 2023-09-12 ENCOUNTER — PHARMACY VISIT (OUTPATIENT)
Dept: PHARMACY | Facility: MEDICAL CENTER | Age: 28
End: 2023-09-12
Payer: COMMERCIAL

## 2023-09-12 VITALS
WEIGHT: 243 LBS | RESPIRATION RATE: 17 BRPM | DIASTOLIC BLOOD PRESSURE: 82 MMHG | HEART RATE: 90 BPM | TEMPERATURE: 99.2 F | OXYGEN SATURATION: 96 % | SYSTOLIC BLOOD PRESSURE: 129 MMHG | BODY MASS INDEX: 45.88 KG/M2 | HEIGHT: 61 IN

## 2023-09-12 PROBLEM — Z98.891 STATUS POST CESAREAN SECTION: Status: ACTIVE | Noted: 2023-09-12

## 2023-09-12 PROCEDURE — 700102 HCHG RX REV CODE 250 W/ 637 OVERRIDE(OP): Performed by: OBSTETRICS & GYNECOLOGY

## 2023-09-12 PROCEDURE — A9270 NON-COVERED ITEM OR SERVICE: HCPCS | Performed by: OBSTETRICS & GYNECOLOGY

## 2023-09-12 PROCEDURE — RXMED WILLOW AMBULATORY MEDICATION CHARGE

## 2023-09-12 PROCEDURE — A9270 NON-COVERED ITEM OR SERVICE: HCPCS

## 2023-09-12 PROCEDURE — 700102 HCHG RX REV CODE 250 W/ 637 OVERRIDE(OP)

## 2023-09-12 RX ORDER — POLYETHYLENE GLYCOL 3350 17 G/17G
1 POWDER, FOR SOLUTION ORAL DAILY
Status: DISCONTINUED | OUTPATIENT
Start: 2023-09-12 | End: 2023-09-12 | Stop reason: HOSPADM

## 2023-09-12 RX ORDER — IBUPROFEN 800 MG/1
800 TABLET ORAL EVERY 8 HOURS
Qty: 30 TABLET | Refills: 0 | Status: SHIPPED | OUTPATIENT
Start: 2023-09-12

## 2023-09-12 RX ORDER — PSEUDOEPHEDRINE HCL 30 MG
100 TABLET ORAL 2 TIMES DAILY PRN
Qty: 60 CAPSULE | Refills: 0 | Status: SHIPPED | OUTPATIENT
Start: 2023-09-12 | End: 2023-11-28

## 2023-09-12 RX ORDER — ASCORBIC ACID 500 MG
500 TABLET ORAL
Qty: 90 TABLET | Refills: 0 | Status: SHIPPED | OUTPATIENT
Start: 2023-09-12 | End: 2023-11-28

## 2023-09-12 RX ORDER — ACETAMINOPHEN 500 MG
1000 TABLET ORAL EVERY 6 HOURS
Qty: 30 TABLET | Refills: 0 | Status: SHIPPED | OUTPATIENT
Start: 2023-09-12

## 2023-09-12 RX ORDER — FERROUS SULFATE 325(65) MG
325 TABLET ORAL
Qty: 90 TABLET | Refills: 0 | Status: SHIPPED | OUTPATIENT
Start: 2023-09-12 | End: 2023-11-28

## 2023-09-12 RX ADMIN — DOCUSATE SODIUM 100 MG: 100 CAPSULE, LIQUID FILLED ORAL at 10:26

## 2023-09-12 RX ADMIN — ACETAMINOPHEN 1000 MG: 500 TABLET ORAL at 06:03

## 2023-09-12 RX ADMIN — POLYETHYLENE GLYCOL 3350 1 PACKET: 17 POWDER, FOR SOLUTION ORAL at 10:26

## 2023-09-12 RX ADMIN — LABETALOL HYDROCHLORIDE 100 MG: 100 TABLET, FILM COATED ORAL at 06:04

## 2023-09-12 RX ADMIN — IBUPROFEN 800 MG: 800 TABLET, FILM COATED ORAL at 06:04

## 2023-09-12 ASSESSMENT — EDINBURGH POSTNATAL DEPRESSION SCALE (EPDS)
I HAVE BEEN ANXIOUS OR WORRIED FOR NO GOOD REASON: YES, SOMETIMES
I HAVE LOOKED FORWARD WITH ENJOYMENT TO THINGS: AS MUCH AS I EVER DID
I HAVE BEEN SO UNHAPPY THAT I HAVE BEEN CRYING: NO, NEVER
I HAVE FELT SAD OR MISERABLE: NO, NOT AT ALL
THINGS HAVE BEEN GETTING ON TOP OF ME: NO, MOST OF THE TIME I HAVE COPED QUITE WELL
THE THOUGHT OF HARMING MYSELF HAS OCCURRED TO ME: NEVER
I HAVE BEEN ABLE TO LAUGH AND SEE THE FUNNY SIDE OF THINGS: AS MUCH AS I ALWAYS COULD
I HAVE BEEN SO UNHAPPY THAT I HAVE HAD DIFFICULTY SLEEPING: NOT AT ALL
I HAVE FELT SCARED OR PANICKY FOR NO GOOD REASON: NO, NOT AT ALL
I HAVE BLAMED MYSELF UNNECESSARILY WHEN THINGS WENT WRONG: NOT VERY OFTEN

## 2023-09-12 ASSESSMENT — PAIN DESCRIPTION - PAIN TYPE: TYPE: SURGICAL PAIN

## 2023-09-12 NOTE — DISCHARGE INSTRUCTIONS
Pelvic rest x6 weeks  Return for follow up visit in 1 week.  Call or come to ED for: heavy vaginal bleeding, fever >100.4, severe abdominal pain, severe headache, chest pain, shortness of breath,  N/V, incisional drainage, or other concerns.     PATIENT DISCHARGE EDUCATION INSTRUCTION SHEET    REASONS TO CALL YOUR OBSTETRICIAN  Persistent fever, shaking, chills (Temperature higher than 100.4) may indicate you have an infection  Heavy bleeding: soaking more than 1 pad per hour; Passing clots an egg-sized clot or bigger may mean you have an postpartum hemorrhage  Foul odor from vagina or bad smelling or discolored discharge or blood  Breast infection (Mastitis symptoms); breast pain, chills, fever, redness or red streaks, may feel flu like symptoms  Urinary pain, burning or frequency  Incision that is not healing, increased redness, swelling, tenderness or pain, or any pus from episiotomy or  site may mean you have an infection  Redness, swelling, warmth, or painful to touch in the calf area of your leg may mean you have a blood clot  Severe or intensified depression, thoughts or feelings of wanting to hurt yourself or someone else   Pain in chest, obstructed breathing or shortness of breath (trouble catching your breath) may mean you are having a postpartum complication. Call your provider immediately   Headache that does not get better, even after taking medicine, a bad headache with vision changes or pain in the upper right area of your belly may mean you have high blood pressure or post birth preeclampsia. Call your provider immediately    HAND WASHING  All family and friends should wash their hands:  Before and after holding the baby  Before feeding the baby  After using the restroom or changing the baby's diaper    WOUND CARE  Ask your physician for additional care instructions. In general:   Incision:  May shower and pat incision dry   Keep the incision clean and dry  There should not be any  opening or pus from the incision  Continue to walk at home 3 times a day   Do NOT lift anything heavier than your baby (over 10 pounds)  Encourage family to help participate in care of the  to allow rest and mom time to heal  Episiotomy/Laceration  May use jeff-spray bottle, witch hazel pads and dermaplast spray for comfort  Use jeff-spray bottle after urinating to cleanse perineal area  To prevent burning during urination spray jeff-water bottle on labial area   Pat perineal area dry until episiotomy/laceration is healed  Continue to use jeff-bottle until bleeding stops as needed  If have a 2nd degree laceration or greater, a Sitz bath can offer relief from soreness, burning, and inflammation   Sitz Bath   Sit in 6 inches of warm water and soak laceration as needed until the laceration heals    VAGINAL CARE AND BLEEDING  Nothing inside vagina for 6 weeks:   No sexual intercourse, tampons or douching  Bleeding may continue for 2-4 weeks. Amount and color may vary  Soaking 1 pad or more in an hour for several hours is considered heavy bleeding  Passing large egg sized blood clots can be concerning  If you feel like you have heavy bleeding or are having increasing amount of blood clots call your Obstetrician immediately  If you begin feeling faint upon standing, feeling sick to your stomach, have clammy skin, a really fast heartbeat, have chills, start feeling confused, dizzy, sleepy or weak, or feeling like you're going to faint call your Obstetrician immediately    HYPERTENSION   Preeclampsia or gestational hypertension are types of high blood pressure that only pregnant women can get. It is important for you to be aware of symptoms to seek early intervention and treatment. If you have any of these symptoms immediately call your Obstetrician    Vision changes or blurred vision   Severe headache or pain that is unrelieved with medication and will not go away  Persistent pain in upper abdomen or shoulder  "  Increased swelling of face, feet, or hands  Difficulty breathing or shortness of breath at rest  Urinating less than usual    URINATION AND BOWEL MOVEMENTS  Eating more fiber (bran cereal, fruits, and vegetables) and drinking plenty of fluids will help to avoid constipation  Urinary frequency and urgency after childbirth is normal  If you experience any urinary pain, burning or frequency call your provider    BIRTH CONTROL  It is possible to become pregnant at any time after delivery and while breastfeeding  Plan to discuss a method of birth control with your physician at your post delivery follow up visit    POSTPARTUM BLUES  During the first few days after birth, you may experience a sense of the \"blues\" which may include impatience, irritability or even crying. These feelings come and go quickly. However, as many as 1 in 10 women experience emotional symptoms known as postpartum depression.     POSTPARTUM DEPRESSION    May start as early as the second or third day after delivery or take several weeks or months to develop. Symptoms of \"blues\" are present, but are more intense: Crying spells; loss of appetite; feelings of hopelessness or loss of control; fear of touching the baby; over concern or no concern at all about the baby; little or no concern about your own appearance/caring for yourself; and/or inability to sleep or excessive sleeping. Contact your Obstetrician if you are experiencing any of these symptoms     PREVENTING SHAKEN BABY  If you are angry or stressed, PUT THE BABY IN THE CRIB, step away, take some deep breaths, and wait until you are calm to care for the baby. DO NOT SHAKE THE BABY. You are not alone, call a supporter for help.  [facility] 24/7 crisis call line (213-890-8012) or (1-429.365.1014)  You can also text them, text \"ANSWER\" (674038)  "

## 2023-09-12 NOTE — PROGRESS NOTES
Assessment complete. Fundus firm, lochia light. VSS. Tolerating diet. Voiding without difficulty. Incision dressing CDI. Pt states passing gas. Pain controlled with scheduled medications per mar. Will offer pain medications as they become available. FOB at bedside, bonding with pt/baby. POC discussed with pt. Encouraged to call with needs. Call light in place.

## 2023-09-12 NOTE — DISCHARGE SUMMARY
Discharge Summary:     Date of Admission: 9/10/2023  Date of Discharge: 23    Admitting diagnosis:    1. Pregnancy @ 37w4d  2. Scheduled C/S due to elevated blood pressure, history of  preeclampsia with severe features, and hx of prior CS delivery    Discharge Diagnosis:   1. Status post  for elevated blood pressure, history of  preeclampsia with severe features, and hx of prior CS delivery    Past Medical History:   Diagnosis Date    Gestational hypertension, third trimester 2019    Postpartum depression 2019    Preeclampsia      OB History    Para Term  AB Living   2 2 1 1   2   SAB IAB Ectopic Molar Multiple Live Births           0 2      # Outcome Date GA Lbr Justin/2nd Weight Sex Delivery Anes PTL Lv   2 Term 09/10/23 37w4d  2.73 kg (6 lb 0.3 oz) F CS-LTranv Spinal N GA   1  19 34w5d  2.135 kg (4 lb 11.3 oz) M CS-LTranv EPI Y GA      Complications: Failure to Progress in First Stage     Past Surgical History:   Procedure Laterality Date    REPEAT C SECTION N/A 9/10/2023    Procedure: REPEAT  SECTION;  Surgeon: Brooke Gillette M.D.;  Location: [facility]ERY [facility] DELIV[facility];  Service: Obstetrics    PILONIDAL CYST EXCISION  2020    Procedure: EXCISION, PILONIDAL CYST;  Surgeon: Carmella Schmitt M.D.;  Location: SURGERY [facility];  Service: General    PRIMARY C SECTION  2019    Procedure: PRIMARY C SECTION;  Surgeon: Zulema Jones M.D.;  Location: LABOR AND DELIVERY;  Service: Obstetrics     Patient has no known allergies.    Patient Active Problem List   Diagnosis    Pilonidal cyst s/p drainage 18    History of  delivery    History of pregnancy induced hypertension    Postpartum depression    Status post  section       Hospital Course:   Pt is a 28 y.o. now  who presented on 9/10/2023 for scheduled C/S. Spinal anesthesia was utilized with good effect on pain. Single female infant was delivered  via C/S at 12:29 on 9/10/23. Apgars 8, 8 at 1 and 5 minutes respectively.  ml.     Postpartum course notable for early ambulation, well managed pain, tolerance of diet, spontaneous voiding, and appropriate feeding of infant. She has remained afebrile and blood pressure has been well controlled. All maternal questions and concerns addressed    Physical Exam:  Temp:  [36.7 °C (98 °F)-37.6 °C (99.7 °F)] 36.7 °C (98 °F)  Pulse:  [] 74  Resp:  [18-20] 18  BP: (107-131)/(70-93) 121/93  SpO2:  [96 %-99 %] 98 %  Physical Exam  General: well appearing, no apparent distress  Abdomen: soft, nontender, nondistended  Fundus: firm at level below umbilicus  Incision: dressing clean, dry, intact  Perineum: Deferred  Extremities: symmetric, no peripheral edema, calves nontender    Current Facility-Administered Medications   Medication Dose    labetalol (Normodyne) tablet 100 mg  100 mg    lactated ringers (LR) infusion      oxytocin (Pitocin) infusion (for post delivery)  125 mL/hr    lactated ringers infusion      ibuprofen (Motrin) tablet 800 mg  800 mg    Followed by    [START ON 9/14/2023] ibuprofen (Motrin) tablet 800 mg  800 mg    acetaminophen (Tylenol) tablet 1,000 mg  1,000 mg    Followed by    [START ON 9/14/2023] acetaminophen (Tylenol) tablet 1,000 mg  1,000 mg    oxyCODONE immediate-release (Roxicodone) tablet 5 mg  5 mg    oxyCODONE immediate release (Roxicodone) tablet 10 mg  10 mg    ondansetron (Zofran) syringe/vial injection 4 mg  4 mg    Or    ondansetron (Zofran ODT) dispertab 4 mg  4 mg    diphenhydrAMINE (Benadryl) tablet/capsule 25 mg  25 mg    Or    diphenhydrAMINE (Benadryl) injection 25 mg  25 mg    docusate sodium (Colace) capsule 100 mg  100 mg    NIFEdipine IR (Procardia) capsule 10 mg  10 mg    simethicone (Mylicon) chewable tablet 125 mg  125 mg       Recent Labs     09/09/23  1629 09/10/23  1030 09/11/23  0521   WBC 9.2 8.8 13.5*   RBC 4.62 4.70 4.17*   HEMOGLOBIN 13.0 13.2 11.7*    HEMATOCRIT 39.3 39.5 35.9*   MCV 85.1 84.0 86.1   MCH 28.1 28.1 28.1   MCHC 33.1 33.4 32.6   RDW 46.6 46.4 47.3   PLATELETCT 169 174 185   MPV 12.6 13.5* 13.0*       Activity/ Discharge Instructions::   Discharge to home  Pelvic Rest x 6 weeks  No heavy lifting x4 weeks  Call or come to ED for: heavy vaginal bleeding, fever >100.4, severe abdominal pain, severe headache, chest pain, shortness of breath,  N/V, incisional drainage, or other concerns.      Follow up:  [facility]) 1 week for incision check for  delivery.     Discharge Meds:   Current Outpatient Medications   Medication Sig Dispense Refill    acetaminophen (TYLENOL) 500 MG Tab Take 2 Tablets by mouth every 6 hours. 30 Tablet 0    docusate sodium 100 MG Cap Take 100 mg by mouth 2 times a day as needed for Constipation. 60 Capsule 0    ibuprofen (MOTRIN) 800 MG Tab Take 1 Tablet by mouth every 8 hours. 30 Tablet 0    ferrous sulfate 325 (65 Fe) MG tablet Take 1 Tablet by mouth every 48 hours. Take with Vitamin C 90 Tablet 0    ascorbic acid (VITAMIN C) 500 MG tablet Take 1 Tablet by mouth every 48 hours. Take with Iron 90 Tablet 0       Reilly Pérez MD, MPH  PGY-1, [facility]

## 2023-09-12 NOTE — DISCHARGE PLANNING
Meds-to-Beds: Discharge prescription orders listed below delivered to patient's bedside. RN notified. Patient counseled. Patient elected to have co-payment billed to patient account.       Current Outpatient Medications   Medication Sig Dispense Refill    acetaminophen (TYLENOL) 500 MG Tab Take 2 Tablets by mouth every 6 hours. 30 Tablet 0    docusate sodium 100 MG Cap Take 1 capsule by mouth 2 times a day as needed for Constipation. 60 Capsule 0    ibuprofen (MOTRIN) 800 MG Tab Take 1 Tablet by mouth every 8 hours. 30 Tablet 0    ferrous sulfate 325 (65 Fe) MG tablet Take 1 Tablet by mouth every 48 hours. Take with Vitamin C 90 Tablet 0    ascorbic acid (VITAMIN C) 500 MG tablet Take 1 Tablet by mouth every 48 hours. Take with Iron 90 Tablet 0      Erin Gresham, PharmD

## 2023-09-12 NOTE — CARE PLAN
The patient is Stable - Low risk of patient condition declining or worsening    Shift Goals  Clinical Goals: VSS,light lochia,ambulate and void without difficulty  Patient Goals: pain control  Family Goals: rest,breast feed    Progress made toward(s) clinical / shift goals:  progressing    Patient is not progressing towards the following goals:

## 2023-09-18 ENCOUNTER — GYNECOLOGY VISIT (OUTPATIENT)
Dept: OBGYN | Facility: CLINIC | Age: 28
End: 2023-09-18
Payer: COMMERCIAL

## 2023-09-18 VITALS
DIASTOLIC BLOOD PRESSURE: 96 MMHG | WEIGHT: 220 LBS | HEIGHT: 63 IN | SYSTOLIC BLOOD PRESSURE: 131 MMHG | BODY MASS INDEX: 38.98 KG/M2

## 2023-09-18 DIAGNOSIS — Z48.89 ENCOUNTER FOR POST SURGICAL WOUND CHECK: Primary | ICD-10-CM

## 2023-09-18 PROBLEM — L05.91 PILONIDAL CYST: Status: RESOLVED | Noted: 2018-10-02 | Resolved: 2023-09-18

## 2023-09-18 PROBLEM — Z98.891 HISTORY OF CESAREAN DELIVERY: Status: RESOLVED | Noted: 2019-04-05 | Resolved: 2023-09-18

## 2023-09-18 PROCEDURE — 3075F SYST BP GE 130 - 139MM HG: CPT | Performed by: OBSTETRICS & GYNECOLOGY

## 2023-09-18 PROCEDURE — 3080F DIAST BP >= 90 MM HG: CPT | Performed by: OBSTETRICS & GYNECOLOGY

## 2023-09-18 PROCEDURE — 0503F POSTPARTUM CARE VISIT: CPT | Performed by: OBSTETRICS & GYNECOLOGY

## 2023-09-18 ASSESSMENT — FIBROSIS 4 INDEX: FIB4 SCORE: 0.77

## 2023-09-18 NOTE — PROGRESS NOTES
Incision Check     CC: Incision Check     HPI: 28 y.o.  s/p  delivery on 9/10/23 with Dr. Gillette presents today for routine incision check  Pt reports doing well.  Denies fevers/chills.  Denies incisional redness/pain or drainage.  Normal lochia.    Is breast feeding     Denies concerns about mood.      Vitals:    23 1301   BP: (!) 131/96      Gen: AAO, NAD  Abd: soft, NT, ND,   Incision C/D/I, healing well     A/P: 28 y.o.  s/p LTCS  - no signs of postop complications  - encouraged BFing, lactation visit prn  - no signs of PP depression     RTC for routine postpartum at 6wks PP    Yanni Sandoval D.O.      Standing/Walking/Toileting

## 2023-09-18 NOTE — PROGRESS NOTES
Patient here for a C/S check  pharmacy verified.  Patient phone #: [phone] (home)   C/S- 9/10/23   PP- 10/19/23    Pt sates sharp pain on the right side of the incision. Pt states she is still bleeding, bleeding is minimal.

## 2023-10-19 ENCOUNTER — APPOINTMENT (OUTPATIENT)
Dept: OBGYN | Facility: CLINIC | Age: 28
End: 2023-10-19
Payer: COMMERCIAL

## 2023-10-19 ENCOUNTER — POST PARTUM (OUTPATIENT)
Dept: OBGYN | Facility: CLINIC | Age: 28
End: 2023-10-19
Payer: COMMERCIAL

## 2023-10-19 VITALS — DIASTOLIC BLOOD PRESSURE: 84 MMHG | SYSTOLIC BLOOD PRESSURE: 120 MMHG | WEIGHT: 215 LBS | BODY MASS INDEX: 38.09 KG/M2

## 2023-10-19 DIAGNOSIS — Z98.891 STATUS POST CESAREAN SECTION: ICD-10-CM

## 2023-10-19 DIAGNOSIS — Z30.09 BIRTH CONTROL COUNSELING: ICD-10-CM

## 2023-10-19 PROCEDURE — 3074F SYST BP LT 130 MM HG: CPT | Performed by: STUDENT IN AN ORGANIZED HEALTH CARE EDUCATION/TRAINING PROGRAM

## 2023-10-19 PROCEDURE — 3079F DIAST BP 80-89 MM HG: CPT | Performed by: STUDENT IN AN ORGANIZED HEALTH CARE EDUCATION/TRAINING PROGRAM

## 2023-10-19 PROCEDURE — 0503F POSTPARTUM CARE VISIT: CPT | Performed by: STUDENT IN AN ORGANIZED HEALTH CARE EDUCATION/TRAINING PROGRAM

## 2023-10-19 RX ORDER — ACETAMINOPHEN AND CODEINE PHOSPHATE 120; 12 MG/5ML; MG/5ML
1 SOLUTION ORAL DAILY
Qty: 84 TABLET | Refills: 3 | Status: SHIPPED | OUTPATIENT
Start: 2023-10-19

## 2023-10-19 ASSESSMENT — EDINBURGH POSTNATAL DEPRESSION SCALE (EPDS)
TOTAL SCORE: 2
THINGS HAVE BEEN GETTING ON TOP OF ME: NO, I HAVE BEEN COPING AS WELL AS EVER
I HAVE BEEN SO UNHAPPY THAT I HAVE BEEN CRYING: NO, NEVER
I HAVE FELT SCARED OR PANICKY FOR NO GOOD REASON: NO, NOT MUCH
THE THOUGHT OF HARMING MYSELF HAS OCCURRED TO ME: NEVER
I HAVE BEEN ANXIOUS OR WORRIED FOR NO GOOD REASON: HARDLY EVER
I HAVE BLAMED MYSELF UNNECESSARILY WHEN THINGS WENT WRONG: NO, NEVER
I HAVE BEEN ABLE TO LAUGH AND SEE THE FUNNY SIDE OF THINGS: AS MUCH AS I ALWAYS COULD
I HAVE FELT SAD OR MISERABLE: NO, NOT AT ALL
I HAVE LOOKED FORWARD WITH ENJOYMENT TO THINGS: AS MUCH AS I EVER DID
I HAVE BEEN SO UNHAPPY THAT I HAVE HAD DIFFICULTY SLEEPING: NOT AT ALL

## 2023-10-19 ASSESSMENT — FIBROSIS 4 INDEX: FIB4 SCORE: 0.77

## 2023-10-19 NOTE — PROGRESS NOTES
POST PARTUM VISIT NOTE    SUBJECTIVE:  Komal Hall is a 28 y.o.  who presents for postpartum exam.   I have reviewed the prenatal and intrapartum course.     Date of delivery :  Repeat  Section 9/10/2023  Type of delivery:      ROS:  She feels well healed.   Laceration and/or Incision is well healed.   Bleeding lasted about 4-6 weeks.Has resolved.  She is getting along well with her partner.   She reports her mood is good. Denies postpartum blues.   She has not had sex.   Denies urinary or stool incontinence.    Last pap date: she believes was  at Dr. Mason (will need to request records)     Delivery laceration(s): no  Feeding: breast feeding  Postpartum course: uncomplicated  Contraceptive plans: would like to discuss OCP    I have reviewed the patient's PMH for contraceptive risk factors, and she has no contraindications to contraception as planned.     OBJECTIVE:    Vitals:    10/19/23 1447   BP: 120/84       Appears well, vital signs normal.  Breast Exam: exam deferred.  Abdomen: soft, nontender  Pelvic Exam:  Perineum: perineum intact and well healed  Vulva: No external lesions, normal hair distribution, no adenopathy  Urethra: appears normal with no lesions    EPDS score:  2    ASSESSMENT:  normal postpartum course    PLAN:  RTO for annual exam due in 4 months  Contraception planned: Discussed multiple options for contraception.  She previously had the Mirena IUD but stated that her partner felt the IUD and this was uncomfortable for him.  She has never had the Nexplanon but states one of her friends and gained a significant amount of weight so she is hesitant to have this contraception.  We also discussed the Depo injection.  She declines that today.  She would like to try the progesterone only minipill.  I discussed with her the importance of taking this daily at the same time.  Discussed with her that if she misses by even an hour there is a risk that she can get  pregnant.  She verbalizes her understanding.  If she is having difficulty removing to take her pill she will contact me and we can discuss further options for her.  All of her questions were answered at this time.  She agrees to the plan.      Mallory Francisco P.A.-C.

## 2023-11-28 ENCOUNTER — OFFICE VISIT (OUTPATIENT)
Dept: URGENT CARE | Facility: CLINIC | Age: 28
End: 2023-11-28
Payer: COMMERCIAL

## 2023-11-28 ENCOUNTER — HOSPITAL ENCOUNTER (OUTPATIENT)
Facility: MEDICAL CENTER | Age: 28
End: 2023-11-28
Attending: PHYSICIAN ASSISTANT
Payer: COMMERCIAL

## 2023-11-28 VITALS
TEMPERATURE: 97.8 F | HEIGHT: 64 IN | WEIGHT: 211.8 LBS | HEART RATE: 86 BPM | OXYGEN SATURATION: 98 % | SYSTOLIC BLOOD PRESSURE: 104 MMHG | BODY MASS INDEX: 36.16 KG/M2 | RESPIRATION RATE: 16 BRPM | DIASTOLIC BLOOD PRESSURE: 62 MMHG

## 2023-11-28 DIAGNOSIS — T81.49XA INCISIONAL ABSCESS: ICD-10-CM

## 2023-11-28 PROCEDURE — 87070 CULTURE OTHR SPECIMN AEROBIC: CPT

## 2023-11-28 PROCEDURE — 87077 CULTURE AEROBIC IDENTIFY: CPT | Mod: 91

## 2023-11-28 PROCEDURE — 3074F SYST BP LT 130 MM HG: CPT | Performed by: PHYSICIAN ASSISTANT

## 2023-11-28 PROCEDURE — 87205 SMEAR GRAM STAIN: CPT

## 2023-11-28 PROCEDURE — 99204 OFFICE O/P NEW MOD 45 MIN: CPT | Performed by: PHYSICIAN ASSISTANT

## 2023-11-28 PROCEDURE — 3078F DIAST BP <80 MM HG: CPT | Performed by: PHYSICIAN ASSISTANT

## 2023-11-28 RX ORDER — CEPHALEXIN 500 MG/1
500 CAPSULE ORAL 4 TIMES DAILY
Qty: 28 CAPSULE | Refills: 0 | Status: SHIPPED | OUTPATIENT
Start: 2023-11-28 | End: 2023-12-05

## 2023-11-28 ASSESSMENT — FIBROSIS 4 INDEX: FIB4 SCORE: 0.77

## 2023-11-28 ASSESSMENT — ENCOUNTER SYMPTOMS
NEUROLOGICAL NEGATIVE: 1
RESPIRATORY NEGATIVE: 1
GASTROINTESTINAL NEGATIVE: 1
CONSTITUTIONAL NEGATIVE: 1
CARDIOVASCULAR NEGATIVE: 1

## 2023-11-29 LAB
GRAM STN SPEC: NORMAL
SIGNIFICANT IND 70042: NORMAL
SITE SITE: NORMAL
SOURCE SOURCE: NORMAL

## 2023-11-29 NOTE — PROGRESS NOTES
Subjective     Yanci Hall is a very pleasant 28 y.o. female who presents with Wound Infection ( she had 3 months ago may be infected. )            HPI  Patient presenting with a concern of a wound infection from her  section scar.  Her  was on 9/10/2023 with no complications.  The wound healed normally without concern.  However over the last few weeks she has noticed some swelling on the left lateral region of the incision.  She developed a small pimple-like cyst which ruptured today excreting a pus and bloody discharge.  There is slight tenderness and erythema.  No systemic symptoms including fever, chills, sweats, rigors, diarrhea or vomiting.      PMH:  has a past medical history of Gestational hypertension, third trimester (2019), Postpartum depression (2019), and Preeclampsia.    She has no past medical history of Addisons disease (HCC), Adrenal disorder ([facility]), Allergy, Anemia, Anxiety, Arrhythmia, Arthritis, Asthma, Blood transfusion without reported diagnosis, Cancer ([facility]), Cataract, CHF (congestive heart failure) ([facility]), Clotting disorder ([facility]), COPD (chronic obstructive pulmonary disease) ([facility]), Cushings syndrome ([facility]), Diabetes ([facility]), Diabetic neuropathy (HCC), GERD (gastroesophageal reflux disease), Glaucoma, Goiter, Head ache, Heart attack ([facility]), Heart murmur, HIV (human immunodeficiency virus infection) ([facility]), Hyperlipidemia, IBD (inflammatory bowel disease), Kidney disease, Meningitis, Migraine, Muscle disorder, Osteoporosis, Parathyroid disorder (HCC), Pituitary disease ([facility]), Pulmonary emphysema ([facility]), Seizure ([facility]), Sickle cell disease ([facility]), Stroke ([facility]), Substance abuse ([facility]), Thyroid disease, Tuberculosis, or Urinary tract infection.  MEDS:   Current Outpatient Medications:     cephALEXin (KEFLEX) 500 MG Cap, Take 1 Capsule by mouth 4 times a day for 7 days., Disp: 28 Capsule, Rfl: 0    norethindrone (MICRONOR) 0.35 MG tablet, Take 1 Tablet by mouth  "every day., Disp: 84 Tablet, Rfl: 3    acetaminophen (TYLENOL) 500 MG Tab, Take 2 Tablets by mouth every 6 hours., Disp: 30 Tablet, Rfl: 0    ibuprofen (MOTRIN) 800 MG Tab, Take 1 Tablet by mouth every 8 hours., Disp: 30 Tablet, Rfl: 0  ALLERGIES: No Known Allergies  SURGHX:   Past Surgical History:   Procedure Laterality Date    REPEAT C SECTION N/A 9/10/2023    Procedure: REPEAT  SECTION;  Surgeon: Brooke Gillette M.D.;  Location: SURGERY LABOR AND DELIVERY;  Service: Obstetrics    PILONIDAL CYST EXCISION  2020    Procedure: EXCISION, PILONIDAL CYST;  Surgeon: Carmella Schmitt M.D.;  Location: SURGERY [facility];  Service: General    PRIMARY C SECTION  2019    Procedure: PRIMARY C SECTION;  Surgeon: Zulema Jones M.D.;  Location: LABOR AND DELIVERY;  Service: Obstetrics     SOCHX:  reports that she has never smoked. She has never used smokeless tobacco. She reports that she does not drink alcohol and does not use drugs.  FH: family history includes No Known Problems in her father, maternal grandfather, maternal grandmother, mother, paternal grandfather, and paternal grandmother.        Review of Systems   Constitutional: Negative.    Respiratory: Negative.     Cardiovascular: Negative.    Gastrointestinal: Negative.    Skin:          incision wound infection   Neurological: Negative.        Medications, Allergies, and current problem list reviewed today in Epic           Objective     /62 (BP Location: Right arm, Patient Position: Sitting, BP Cuff Size: Large adult)   Pulse 86   Temp 36.6 °C (97.8 °F) (Temporal)   Resp 16   Ht 1.626 m (5' 4\")   Wt 96.1 kg (211 lb 12.8 oz)   LMP 12/15/2022   SpO2 98%   BMI 36.36 kg/m²      Physical Exam  Vitals and nursing note reviewed.   Constitutional:       General: She is not in acute distress.     Appearance: Normal appearance. She is well-developed. She is not ill-appearing, toxic-appearing or diaphoretic.   HENT:      Head: " Normocephalic and atraumatic.      Right Ear: Hearing and external ear normal.      Left Ear: Hearing and external ear normal.      Nose: Nose normal.   Eyes:      General:         Right eye: No discharge.         Left eye: No discharge.      Conjunctiva/sclera: Conjunctivae normal.   Cardiovascular:      Rate and Rhythm: Normal rate and regular rhythm.      Heart sounds: Normal heart sounds.   Pulmonary:      Effort: Pulmonary effort is normal. No respiratory distress.      Breath sounds: Normal breath sounds. No wheezing.   Musculoskeletal:      Cervical back: Normal range of motion and neck supple.   Skin:     General: Skin is warm and dry.             Comments: There is a cyst like lesion on the left lateral portion of her  incision.  There is a fluctuant, tender, erythematous area.  There is no red streaking.  No generalized abdominal pain or distention.  Gentle expression emits a serosanguineous puslike discharge.  No obvious ingrown hair or retained suture   Neurological:      General: No focal deficit present.      Mental Status: She is alert and oriented to person, place, and time.   Psychiatric:         Mood and Affect: Mood normal.         Behavior: Behavior normal.         Thought Content: Thought content normal.         Judgment: Judgment normal.                             Assessment & Plan     This is a very pleasant 28-year-old female presenting with concerns of an infection of her  incision scar for the past 3 days.   section 9/10/2023 without complications.  Wound did heal appropriately however last few days she had noticed swelling, redness and today there was pus/bloody discharge.  No spreading erythema, generalized abdominal pain or systemic symptoms.  Vitals are normal.  Exam shows a likely infection of her incision.  There is an fluctuant, erythematous, tender abscess on the left lateral portion of the scar.  Gentle expression emits a serosanguineous puslike discharge.   No spreading erythema, generalized abdominal pain or distention.  A wound culture was obtained of discharge.  Patient be started on Keflex as she is currently breast-feeding.  May need to add Bactrim pending wound culture results.  Did discuss possibility of ingrown hair versus retained suture.  There is no signs of deep tissue or systemic involvement.  Strict ER and red flag symptoms discussed with patient    1. Incisional abscess  cephALEXin (KEFLEX) 500 MG Cap    CULTURE WOUND W/ GRAM STAIN          I personally reviewed prior external notes and test results pertinent to today's visit. Return to clinic or go to ED if symptoms worsen or persist. Red flag symptoms and indications for ED discussed at length. Patient/Parent/Guardian voices understanding.  AVS with post-visit instructions provided or given verbally.  Follow-up with your primary care provider in 3-5 days. All side effects and potential interactions of prescribed medication discussed including allergic response, GI upset, tendon injury, rash, sedation, OCP effectiveness, etc.    Please note that this dictation was created using voice recognition software. I have made every reasonable attempt to correct obvious errors, but I expect that there are errors of grammar and possibly content that I did not discover before finalizing the note.

## 2023-12-02 LAB
BACTERIA WND AEROBE CULT: NORMAL
GRAM STN SPEC: NORMAL
SIGNIFICANT IND 70042: NORMAL
SITE SITE: NORMAL
SOURCE SOURCE: NORMAL

## 2025-02-21 ENCOUNTER — OFFICE VISIT (OUTPATIENT)
Dept: URGENT CARE | Facility: PHYSICIAN GROUP | Age: 30
End: 2025-02-21
Payer: COMMERCIAL

## 2025-02-21 VITALS
TEMPERATURE: 99.7 F | BODY MASS INDEX: 29.89 KG/M2 | RESPIRATION RATE: 16 BRPM | HEIGHT: 63 IN | OXYGEN SATURATION: 95 % | HEART RATE: 96 BPM | DIASTOLIC BLOOD PRESSURE: 80 MMHG | SYSTOLIC BLOOD PRESSURE: 116 MMHG | WEIGHT: 168.7 LBS

## 2025-02-21 DIAGNOSIS — J10.1 INFLUENZA B: ICD-10-CM

## 2025-02-21 DIAGNOSIS — J02.9 PHARYNGITIS, UNSPECIFIED ETIOLOGY: ICD-10-CM

## 2025-02-21 LAB
FLUAV RNA SPEC QL NAA+PROBE: NEGATIVE
FLUBV RNA SPEC QL NAA+PROBE: POSITIVE
RSV RNA SPEC QL NAA+PROBE: NEGATIVE
S PYO DNA SPEC NAA+PROBE: NOT DETECTED
SARS-COV-2 RNA RESP QL NAA+PROBE: NEGATIVE

## 2025-02-21 PROCEDURE — 87651 STREP A DNA AMP PROBE: CPT

## 2025-02-21 PROCEDURE — 3079F DIAST BP 80-89 MM HG: CPT

## 2025-02-21 PROCEDURE — 3074F SYST BP LT 130 MM HG: CPT

## 2025-02-21 PROCEDURE — 99213 OFFICE O/P EST LOW 20 MIN: CPT

## 2025-02-21 PROCEDURE — 0241U POCT CEPHEID COV-2, FLU A/B, RSV - PCR: CPT

## 2025-02-21 ASSESSMENT — ENCOUNTER SYMPTOMS
SORE THROAT: 1
COUGH: 1
FEVER: 1
VOMITING: 1
DIARRHEA: 0
HEADACHES: 1

## 2025-02-21 ASSESSMENT — FIBROSIS 4 INDEX: FIB4 SCORE: 0.8

## 2025-02-21 NOTE — PROGRESS NOTES
Subjective:     CHIEF COMPLAINT  Chief Complaint   Patient presents with    Cough     Sore throat, fever, severe headache, x4 days       HPI  Komal Hall is a very pleasant 29 y.o. female who presents with a sore throat, headache, bilateral ear pain, and burning in her throat with swallowing that has been present for the past 4 days.  Her temperatures worsen at night.  She has had elevated temperatures up to 100.  She has also had episodes of vomiting following eating dairy and eggs.  She has not had any diarrhea.  She developed a small cough today.  Her father was sick with similar symptoms.    REVIEW OF SYSTEMS  Review of Systems   Constitutional:  Positive for fever.   HENT:  Positive for congestion and sore throat.    Respiratory:  Positive for cough (Started today).    Gastrointestinal:  Positive for vomiting. Negative for diarrhea.   Neurological:  Positive for headaches.       PAST MEDICAL HISTORY  Patient Active Problem List    Diagnosis Date Noted    Status post  section 2023    History of pregnancy induced hypertension 2019    Postpartum depression 2019       SURGICAL HISTORY   has a past surgical history that includes primary c section (2019); pilonidal cyst excision (2020); and repeat c section (N/A, 9/10/2023).    ALLERGIES  No Known Allergies    CURRENT MEDICATIONS  Home Medications       Reviewed by ELIOT GarnerCTruman (Physician Assistant) on 25 at 1153  Med List Status: <None>     Medication Last Dose Status   acetaminophen (TYLENOL) 500 MG Tab PRN Active   ibuprofen (MOTRIN) 800 MG Tab PRN Active   norethindrone (MICRONOR) 0.35 MG tablet  Active                    SOCIAL HISTORY  Social History     Tobacco Use    Smoking status: Never    Smokeless tobacco: Never   Vaping Use    Vaping status: Never Used   Substance and Sexual Activity    Alcohol use: No    Drug use: No    Sexual activity: Yes     Partners: Male     Birth  "control/protection: Pill       FAMILY HISTORY  Family History   Problem Relation Age of Onset    No Known Problems Mother     No Known Problems Father     No Known Problems Maternal Grandmother     No Known Problems Maternal Grandfather     No Known Problems Paternal Grandmother     No Known Problems Paternal Grandfather           Objective:     VITAL SIGNS: /80 (BP Location: Right arm, Patient Position: Sitting, BP Cuff Size: Adult)   Pulse 96   Temp 37.6 °C (99.7 °F) (Temporal)   Resp 16   Ht 1.6 m (5' 3\")   Wt 76.5 kg (168 lb 11.2 oz)   SpO2 95%   BMI 29.88 kg/m²     PHYSICAL EXAM  Physical Exam  Vitals reviewed.   Constitutional:       General: She is not in acute distress.     Appearance: Normal appearance. She is ill-appearing. She is not toxic-appearing.   HENT:      Head: Normocephalic and atraumatic.      Right Ear: Tympanic membrane, ear canal and external ear normal.      Left Ear: Tympanic membrane, ear canal and external ear normal.      Mouth/Throat:      Lips: No lesions.      Mouth: Mucous membranes are moist.      Pharynx: Pharyngeal swelling and posterior oropharyngeal erythema present. No oropharyngeal exudate.      Tonsils: No tonsillar exudate. 2+ on the right. 2+ on the left.   Eyes:      Conjunctiva/sclera: Conjunctivae normal.      Pupils: Pupils are equal, round, and reactive to light.   Cardiovascular:      Rate and Rhythm: Normal rate and regular rhythm.      Heart sounds: Normal heart sounds.   Pulmonary:      Effort: Pulmonary effort is normal. No respiratory distress.      Breath sounds: Normal breath sounds. No stridor. No wheezing, rhonchi or rales.   Lymphadenopathy:      Cervical: Cervical adenopathy present.   Skin:     General: Skin is warm and dry.      Coloration: Skin is not pale.      Findings: No rash.   Neurological:      General: No focal deficit present.      Mental Status: She is alert and oriented to person, place, and time.   Psychiatric:         Mood and " Affect: Mood normal.         Assessment/Plan:     1. Pharyngitis, unspecified etiology  - POCT CEPHEID GROUP A STREP - PCR    2. Influenza B  - POCT CoV-2, Flu A/B, RSV by PCR  -Warm salt water gargles as needed for sore throat  -Tylenol/ibuprofen OTC as needed for pain  -Return to clinic if symptoms worsen or fail to resolve      MDM/Comments:  Patient has stable vital signs and is non-toxic appearing.  Strep and viral testing for COVID, influenza, and RSV performed in office with positive influenza B results.  Patient was called and informed of results.  She is currently outside the therapeutic window for Tamiflu.  Discussed supportive care measures with warm salt water gargles, rest, tylenol OTC as needed for pain, and maintaining adequate hydration. Patient demonstrated understanding of treatment plan and agreed to return to the clinic if symptoms worsen or fail to resolve.     Differential diagnosis, natural history, supportive care, and indications for immediate follow-up discussed. All questions answered. Patient agrees with the plan of care.    Follow-up as needed if symptoms worsen or fail to improve to PCP, Urgent care or Emergency Room.    I have personally reviewed prior external notes and test results pertinent to today's visit.  I have independently reviewed and interpreted all diagnostics ordered during this urgent care acute visit.   Discussed management options (risks,benefits, and alternatives to treatment). Pt expresses understanding and the treatment plan was agreed upon. Questions were encouraged and answered to pt's satisfaction.    Please note that this dictation was created using voice recognition software. I have made a reasonable attempt to correct obvious errors, but I expect that there are errors of grammar and possibly content that I did not discover before finalizing the note.

## 2025-04-16 ENCOUNTER — APPOINTMENT (OUTPATIENT)
Dept: RADIOLOGY | Facility: MEDICAL CENTER | Age: 30
End: 2025-04-16
Attending: EMERGENCY MEDICINE
Payer: COMMERCIAL

## 2025-04-16 ENCOUNTER — HOSPITAL ENCOUNTER (EMERGENCY)
Facility: MEDICAL CENTER | Age: 30
End: 2025-04-16
Attending: EMERGENCY MEDICINE
Payer: COMMERCIAL

## 2025-04-16 VITALS
DIASTOLIC BLOOD PRESSURE: 76 MMHG | HEART RATE: 63 BPM | OXYGEN SATURATION: 94 % | RESPIRATION RATE: 16 BRPM | BODY MASS INDEX: 29.61 KG/M2 | TEMPERATURE: 98.2 F | SYSTOLIC BLOOD PRESSURE: 111 MMHG | WEIGHT: 167.11 LBS | HEIGHT: 63 IN

## 2025-04-16 DIAGNOSIS — R10.2 PELVIC PAIN: ICD-10-CM

## 2025-04-16 LAB
ALBUMIN SERPL BCP-MCNC: 4.5 G/DL (ref 3.2–4.9)
ALBUMIN/GLOB SERPL: 1.5 G/DL
ALP SERPL-CCNC: 104 U/L (ref 30–99)
ALT SERPL-CCNC: 35 U/L (ref 2–50)
ANION GAP SERPL CALC-SCNC: 10 MMOL/L (ref 7–16)
APPEARANCE UR: CLEAR
AST SERPL-CCNC: 24 U/L (ref 12–45)
BASOPHILS # BLD AUTO: 0.6 % (ref 0–1.8)
BASOPHILS # BLD: 0.03 K/UL (ref 0–0.12)
BILIRUB SERPL-MCNC: 0.2 MG/DL (ref 0.1–1.5)
BILIRUB UR QL STRIP.AUTO: NEGATIVE
BUN SERPL-MCNC: 13 MG/DL (ref 8–22)
CALCIUM ALBUM COR SERPL-MCNC: 8.7 MG/DL (ref 8.5–10.5)
CALCIUM SERPL-MCNC: 9.1 MG/DL (ref 8.5–10.5)
CHLORIDE SERPL-SCNC: 106 MMOL/L (ref 96–112)
CO2 SERPL-SCNC: 23 MMOL/L (ref 20–33)
COLOR UR: YELLOW
CREAT SERPL-MCNC: 0.58 MG/DL (ref 0.5–1.4)
EOSINOPHIL # BLD AUTO: 0.14 K/UL (ref 0–0.51)
EOSINOPHIL NFR BLD: 2.9 % (ref 0–6.9)
ERYTHROCYTE [DISTWIDTH] IN BLOOD BY AUTOMATED COUNT: 44.2 FL (ref 35.9–50)
GFR SERPLBLD CREATININE-BSD FMLA CKD-EPI: 126 ML/MIN/1.73 M 2
GLOBULIN SER CALC-MCNC: 3 G/DL (ref 1.9–3.5)
GLUCOSE SERPL-MCNC: 76 MG/DL (ref 65–99)
GLUCOSE UR STRIP.AUTO-MCNC: NEGATIVE MG/DL
HCG SERPL QL: NEGATIVE
HCT VFR BLD AUTO: 43 % (ref 37–47)
HGB BLD-MCNC: 14 G/DL (ref 12–16)
IMM GRANULOCYTES # BLD AUTO: 0.01 K/UL (ref 0–0.11)
IMM GRANULOCYTES NFR BLD AUTO: 0.2 % (ref 0–0.9)
KETONES UR STRIP.AUTO-MCNC: ABNORMAL MG/DL
LEUKOCYTE ESTERASE UR QL STRIP.AUTO: NEGATIVE
LIPASE SERPL-CCNC: 25 U/L (ref 11–82)
LYMPHOCYTES # BLD AUTO: 1.88 K/UL (ref 1–4.8)
LYMPHOCYTES NFR BLD: 38.8 % (ref 22–41)
MCH RBC QN AUTO: 29.8 PG (ref 27–33)
MCHC RBC AUTO-ENTMCNC: 32.6 G/DL (ref 32.2–35.5)
MCV RBC AUTO: 91.5 FL (ref 81.4–97.8)
MICRO URNS: ABNORMAL
MONOCYTES # BLD AUTO: 0.26 K/UL (ref 0–0.85)
MONOCYTES NFR BLD AUTO: 5.4 % (ref 0–13.4)
NEUTROPHILS # BLD AUTO: 2.53 K/UL (ref 1.82–7.42)
NEUTROPHILS NFR BLD: 52.1 % (ref 44–72)
NITRITE UR QL STRIP.AUTO: NEGATIVE
NRBC # BLD AUTO: 0 K/UL
NRBC BLD-RTO: 0 /100 WBC (ref 0–0.2)
PH UR STRIP.AUTO: 5.5 [PH] (ref 5–8)
PLATELET # BLD AUTO: 224 K/UL (ref 164–446)
PMV BLD AUTO: 11.2 FL (ref 9–12.9)
POTASSIUM SERPL-SCNC: 4.2 MMOL/L (ref 3.6–5.5)
PROT SERPL-MCNC: 7.5 G/DL (ref 6–8.2)
PROT UR QL STRIP: NEGATIVE MG/DL
RBC # BLD AUTO: 4.7 M/UL (ref 4.2–5.4)
RBC UR QL AUTO: NEGATIVE
SODIUM SERPL-SCNC: 139 MMOL/L (ref 135–145)
SP GR UR STRIP.AUTO: 1.03
UROBILINOGEN UR STRIP.AUTO-MCNC: 1 EU/DL
WBC # BLD AUTO: 4.9 K/UL (ref 4.8–10.8)

## 2025-04-16 PROCEDURE — 36415 COLL VENOUS BLD VENIPUNCTURE: CPT

## 2025-04-16 PROCEDURE — 99284 EMERGENCY DEPT VISIT MOD MDM: CPT

## 2025-04-16 PROCEDURE — 76830 TRANSVAGINAL US NON-OB: CPT

## 2025-04-16 PROCEDURE — A9270 NON-COVERED ITEM OR SERVICE: HCPCS | Performed by: EMERGENCY MEDICINE

## 2025-04-16 PROCEDURE — 83690 ASSAY OF LIPASE: CPT

## 2025-04-16 PROCEDURE — 74177 CT ABD & PELVIS W/CONTRAST: CPT

## 2025-04-16 PROCEDURE — 700117 HCHG RX CONTRAST REV CODE 255: Performed by: EMERGENCY MEDICINE

## 2025-04-16 PROCEDURE — 81003 URINALYSIS AUTO W/O SCOPE: CPT

## 2025-04-16 PROCEDURE — 84703 CHORIONIC GONADOTROPIN ASSAY: CPT

## 2025-04-16 PROCEDURE — 85025 COMPLETE CBC W/AUTO DIFF WBC: CPT

## 2025-04-16 PROCEDURE — 700102 HCHG RX REV CODE 250 W/ 637 OVERRIDE(OP): Performed by: EMERGENCY MEDICINE

## 2025-04-16 PROCEDURE — 80053 COMPREHEN METABOLIC PANEL: CPT

## 2025-04-16 RX ORDER — OXYCODONE AND ACETAMINOPHEN 5; 325 MG/1; MG/1
1 TABLET ORAL ONCE
Refills: 0 | Status: COMPLETED | OUTPATIENT
Start: 2025-04-16 | End: 2025-04-16

## 2025-04-16 RX ADMIN — IOHEXOL 100 ML: 350 INJECTION, SOLUTION INTRAVENOUS at 14:30

## 2025-04-16 RX ADMIN — OXYCODONE AND ACETAMINOPHEN 1 TABLET: 5; 325 TABLET ORAL at 11:23

## 2025-04-16 ASSESSMENT — FIBROSIS 4 INDEX: FIB4 SCORE: 0.8

## 2025-04-16 NOTE — ED PROVIDER NOTES
ED Provider Note    CHIEF COMPLAINT  Chief Complaint   Patient presents with    Pelvic Pain     Left lower pelvic pain   Radiates to flank    Back Pain     Lower back pain x 4 days        EXTERNAL RECORDS REVIEWED  Other none germane to today's visit    HPI/ROS  LIMITATION TO HISTORY   Select: : None  OUTSIDE HISTORIAN(S):  Significant other reports that the patient has taken 3 at home pregnancy tests which were negative.    Komal Hall is a 29 y.o. female who presents with a chief complaint of bilateral lower quadrant abdominal/pelvic pain.  The pain started 4 days ago and has been persistent.  It is improved when she rests but worse when she moves.  She was initially seen at [facility] where STD testing was performed and was negative.  She also had a negative pregnancy test.  She has taken 3 at home pregnancy test which were all negative.  She has not had any fevers, nausea, vomiting but has had some loose stools which she attributes to chamomile tea.  She has tried Tylenol and ibuprofen without any improvement in her symptoms.  Initially the pain was worse in the right adnexal region but has now migrated to the middle and left adnexal region.  She denies alcohol or drug use.    PAST MEDICAL HISTORY   has a past medical history of Gestational hypertension, third trimester (1/28/2019), Postpartum depression (4/5/2019), and Preeclampsia.    SURGICAL HISTORY   has a past surgical history that includes primary c section (2/28/2019); pilonidal cyst excision (8/22/2020); and repeat c section (N/A, 9/10/2023).    FAMILY HISTORY  Family History   Problem Relation Age of Onset    No Known Problems Mother     No Known Problems Father     No Known Problems Maternal Grandmother     No Known Problems Maternal Grandfather     No Known Problems Paternal Grandmother     No Known Problems Paternal Grandfather        SOCIAL HISTORY  Social History     Tobacco Use    Smoking status: Never    Smokeless  "tobacco: Never   Vaping Use    Vaping status: Never Used   Substance and Sexual Activity    Alcohol use: No    Drug use: No    Sexual activity: Yes     Partners: Male     Birth control/protection: Pill       CURRENT MEDICATIONS  Home Medications       Reviewed by Olivia Rabago R.N. (Registered Nurse) on 04/16/25 at 1014  Med List Status: Partial     Medication Last Dose Status   acetaminophen (TYLENOL) 500 MG Tab  Active   ibuprofen (MOTRIN) 800 MG Tab  Active   norethindrone (MICRONOR) 0.35 MG tablet  Active                    ALLERGIES  No Known Allergies    PHYSICAL EXAM  VITAL SIGNS: /82   Pulse 83   Temp 36.7 °C (98 °F) (Temporal)   Resp 18   Ht 1.6 m (5' 3\")   Wt 75.8 kg (167 lb 1.7 oz)   LMP 02/26/2025   SpO2 98%   BMI 29.60 kg/m²    Physical Exam  Vitals and nursing note reviewed.   Constitutional:       Appearance: Normal appearance.   HENT:      Head: Normocephalic and atraumatic.      Right Ear: External ear normal.      Left Ear: External ear normal.      Nose: Nose normal.      Mouth/Throat:      Mouth: Mucous membranes are moist.      Pharynx: Oropharynx is clear.   Eyes:      Extraocular Movements: Extraocular movements intact.      Conjunctiva/sclera: Conjunctivae normal.      Pupils: Pupils are equal, round, and reactive to light.   Cardiovascular:      Rate and Rhythm: Normal rate and regular rhythm.   Pulmonary:      Effort: Pulmonary effort is normal.      Breath sounds: Normal breath sounds.   Abdominal:      Palpations: Abdomen is soft.      Tenderness: There is abdominal tenderness. There is no right CVA tenderness or left CVA tenderness.      Comments: Bilateral lower quadrant and suprapubic tenderness.  Mild left upper quadrant tenderness.  No masses.  No guarding or rebound.  No peritoneal signs.   Musculoskeletal:         General: Normal range of motion.      Cervical back: Normal range of motion and neck supple.   Skin:     General: Skin is warm and dry. "   Neurological:      General: No focal deficit present.      Mental Status: She is alert and oriented to person, place, and time.   Psychiatric:         Mood and Affect: Mood normal.         Behavior: Behavior normal.       EKG/LABS  Results for orders placed or performed during the hospital encounter of 04/16/25   URINALYSIS,CULTURE IF INDICATED    Collection Time: 04/16/25 10:44 AM    Specimen: Urine   Result Value Ref Range    Color Yellow     Character Clear     Specific Gravity 1.027 <1.035    Ph 5.5 5.0 - 8.0    Glucose Negative Negative mg/dL    Ketones Trace (A) Negative mg/dL    Protein Negative Negative mg/dL    Bilirubin Negative Negative    Urobilinogen, Urine 1.0 <=1.0 EU/dL    Nitrite Negative Negative    Leukocyte Esterase Negative Negative    Occult Blood Negative Negative    Micro Urine Req see below    CBC WITH DIFFERENTIAL    Collection Time: 04/16/25 11:12 AM   Result Value Ref Range    WBC 4.9 4.8 - 10.8 K/uL    RBC 4.70 4.20 - 5.40 M/uL    Hemoglobin 14.0 12.0 - 16.0 g/dL    Hematocrit 43.0 37.0 - 47.0 %    MCV 91.5 81.4 - 97.8 fL    MCH 29.8 27.0 - 33.0 pg    MCHC 32.6 32.2 - 35.5 g/dL    RDW 44.2 35.9 - 50.0 fL    Platelet Count 224 164 - 446 K/uL    MPV 11.2 9.0 - 12.9 fL    Neutrophils-Polys 52.10 44.00 - 72.00 %    Lymphocytes 38.80 22.00 - 41.00 %    Monocytes 5.40 0.00 - 13.40 %    Eosinophils 2.90 0.00 - 6.90 %    Basophils 0.60 0.00 - 1.80 %    Immature Granulocytes 0.20 0.00 - 0.90 %    Nucleated RBC 0.00 0.00 - 0.20 /100 WBC    Neutrophils (Absolute) 2.53 1.82 - 7.42 K/uL    Lymphs (Absolute) 1.88 1.00 - 4.80 K/uL    Monos (Absolute) 0.26 0.00 - 0.85 K/uL    Eos (Absolute) 0.14 0.00 - 0.51 K/uL    Baso (Absolute) 0.03 0.00 - 0.12 K/uL    Immature Granulocytes (abs) 0.01 0.00 - 0.11 K/uL    NRBC (Absolute) 0.00 K/uL   COMP METABOLIC PANEL    Collection Time: 04/16/25 11:12 AM   Result Value Ref Range    Sodium 139 135 - 145 mmol/L    Potassium 4.2 3.6 - 5.5 mmol/L    Chloride 106 96 -  112 mmol/L    Co2 23 20 - 33 mmol/L    Anion Gap 10.0 7.0 - 16.0    Glucose 76 65 - 99 mg/dL    Bun 13 8 - 22 mg/dL    Creatinine 0.58 0.50 - 1.40 mg/dL    Calcium 9.1 8.5 - 10.5 mg/dL    Correct Calcium 8.7 8.5 - 10.5 mg/dL    AST(SGOT) 24 12 - 45 U/L    ALT(SGPT) 35 2 - 50 U/L    Alkaline Phosphatase 104 (H) 30 - 99 U/L    Total Bilirubin 0.2 0.1 - 1.5 mg/dL    Albumin 4.5 3.2 - 4.9 g/dL    Total Protein 7.5 6.0 - 8.2 g/dL    Globulin 3.0 1.9 - 3.5 g/dL    A-G Ratio 1.5 g/dL   LIPASE    Collection Time: 04/16/25 11:12 AM   Result Value Ref Range    Lipase 25 11 - 82 U/L   HCG QUAL SERUM    Collection Time: 04/16/25 11:12 AM   Result Value Ref Range    Beta-Hcg Qualitative Serum Negative Negative   ESTIMATED GFR    Collection Time: 04/16/25 11:12 AM   Result Value Ref Range    GFR (CKD-EPI) 126 >60 mL/min/1.73 m 2     RADIOLOGY/PROCEDURES   I have independently interpreted the diagnostic imaging associated with this visit and am waiting the final reading from the radiologist.   My preliminary interpretation is as follows: No free intraperitoneal air    Radiologist interpretation:  CT-ABDOMEN-PELVIS WITH   Final Result      1. Decrease in size of the left kidney secondary to multiple left renal cortical scars.   2. Compensatory enlargement of the right kidney.   3. No acute inflammatory process in the abdomen or pelvis.      US-PELVIC TRANSVAGINAL ONLY   Final Result      1.  Normal transvaginal appearance of the pelvis.          COURSE & MEDICAL DECISION MAKING    ASSESSMENT, COURSE AND PLAN  Care Narrative: This is a 29-year-old female who is here with persistent pelvic pain for the past 4 days.  She was initially seen in the [facility] where STD testing was performed and was negative.  She does not wish to have additional testing performed today.    Differential diagnosis includes, but is not limited to, pregnancy, ectopic pregnancy, vaginitis, STI, endometriosis, ovarian cyst, ovarian rupture,  appendicitis, diverticulitis, colitis, viral syndrome, UTI.    Arrives afebrile with normal vital signs.  Appears well-hydrated and nontoxic.  Abdomen is soft with tenderness in the suprapubic and left lower quadrant region.  She is not peritoneal.  She has no CVAT.    CBC is reassuring without leukocytosis or anemia.  She has a normal differential.  Metabolic panel reveals normal electrolytes, renal, and liver function.  Lipase is normal.  Urinalysis with trace ketones but otherwise does not suggest infection.  hCG is negative.    A transvaginal ultrasound was initially obtained which was normal.  Because of the persistent pain she subsequently underwent a CT of the abdomen/pelvis which did not reveal acute abnormality.    Patient was reevaluated at bedside.  She is resting comfortably.  Vital signs remain normal and stable.  We went over lab and imaging results.  I have recommended Tylenol, ibuprofen, and warm compresses.  I recommended that she follow-up with gynecology and placed a referral on her behalf.  And over strict return precautions.  She was discharged in good and stable condition.    ADDITIONAL PROBLEMS MANAGED  None    DISPOSITION AND DISCUSSIONS  I have discussed management of the patient with the following physicians and VIN's: None    Discussion of management with other QHP or appropriate source(s): None     Escalation of care considered, and ultimately not performed:acute inpatient care management, however at this time, the patient is most appropriate for outpatient management    Barriers to care at this time, including but not limited to:  None .     Decision tools and prescription drugs considered including, but not limited to:  None .    FINAL DIAGNOSIS  1. Pelvic pain       Electronically signed by: Richmond Salazar M.D., 4/16/2025 10:45 AM

## 2025-04-16 NOTE — ED NOTES
Bedside report from LENIN Masters. Pt resting in [place] comfortably, on monitor, call light in reach.  Pt is on RA, GCS 15.  Necessary fall precautions in place.

## 2025-04-16 NOTE — DISCHARGE INSTRUCTIONS
You were seen in the ER for pelvic pain.  Thankfully your labs and imaging are reassuring and there is no indication for additional ER workup or hospitalization.  I recommend that you follow-up with a gynecologist and placed a referral on your behalf.  Please also follow-up with your primary care provider to discuss your ER visit.  I recommend Tylenol and ibuprofen as directed on the bottle for pain management.  You can also use warm packs as are helpful.  Please return to the ER with new or worsening symptoms.  I hope you feel better soon!

## 2025-04-16 NOTE — ED TRIAGE NOTES
.  Chief Complaint   Patient presents with    Pelvic Pain     Left lower pelvic pain   Radiates to flank    Back Pain     Lower back pain x 4 days      Ambulated to triage with s/o. Pt seen at [facility] and US recommended, unable to find available imaging center. Pain has worsened. Denies pregnancy irregular menses.

## (undated) DEVICE — KIT ANESTHESIA W/CIRCUIT & 3/LT BAG W/FILTER (20EA/CA)

## (undated) DEVICE — ELECTRODE DUAL RETURN W/ CORD - (50/PK)

## (undated) DEVICE — PACK ROOM TURNOVER L&D (12/CA)

## (undated) DEVICE — SPONGE GAUZESTER 4 X 4 4PLY - (128PK/CA)

## (undated) DEVICE — TUBING CLEARLINK DUO-VENT - C-FLO (48EA/CA)

## (undated) DEVICE — LACTATED RINGERS INJ 1000 ML - (14EA/CA 60CA/PF)

## (undated) DEVICE — TRAY SRGPRP PVP IOD WT PRP - (20/CA)

## (undated) DEVICE — WATER IRRIGATION STERILE 1000ML (12EA/CA)

## (undated) DEVICE — SUTURE 3-0 VICRYL PLUS CT-1 - 36 INCH (36/BX)

## (undated) DEVICE — TRAY SPINAL ANESTHESIA NON-SAFETY (10/CA)

## (undated) DEVICE — BLANKET UNDERBODY FULL ACCES - (5/CA)

## (undated) DEVICE — GLOVE BIOGEL SZ 8 SURGICAL PF LTX - (50PR/BX 4BX/CA)

## (undated) DEVICE — TRAY BLADDER CARE W/ 16 FR FOLEY CATHETER STATLOCK  (10/CA)

## (undated) DEVICE — SUTURE 0 VICRYL PLUS CT 36 (36PK/BX)"

## (undated) DEVICE — CATHETER IV NON-SAFETY 18 GA X 1 1/4 (50/BX 4BX/CA)

## (undated) DEVICE — SUTURE 0 VICRYL PLUS CT-1 - 36 INCH (36/BX)

## (undated) DEVICE — SODIUM CHL IRRIGATION 0.9% 1000ML (12EA/CA)

## (undated) DEVICE — BRIEF STRETCH MATERNITY M/L - FITS 20-60IN (5EA/BG 20BG/CA)

## (undated) DEVICE — STAPLER SKIN DISP - (6/BX 10BX/CA) VISISTAT

## (undated) DEVICE — SUTURE 3-0 ETHILON PS-1 (36PK/BX)

## (undated) DEVICE — SUTURE 1 CHROMIC CTX ETHICON - (36PK/BX)

## (undated) DEVICE — MASK ANESTHESIA ADULT  - (100/CA)

## (undated) DEVICE — PACK C-SECTION (2EA/CA)

## (undated) DEVICE — GLOVE BIOGEL SZ 7 SURGICAL PF LTX - (50PR/BX 4BX/CA)

## (undated) DEVICE — GAUZE PACKING STRIP STERILE IODOFORM 1/2 IN X 5 YDS

## (undated) DEVICE — BLADE SURGICAL #15 - (50/BX 3BX/CA)

## (undated) DEVICE — GLOVE SZ 6.5 BIOGEL PI MICRO - PF LF (50PR/BX)

## (undated) DEVICE — DRAPE LAPAROTOMY T SHEET - (12EA/CA)

## (undated) DEVICE — HEAD HOLDER JUNIOR/ADULT

## (undated) DEVICE — NEPTUNE 4 PORT MANIFOLD - (20/PK)

## (undated) DEVICE — KIT  I.V. START (100EA/CA)

## (undated) DEVICE — SLEEVE, SEQUENTIAL CALF REG

## (undated) DEVICE — SHEATH RO 4F 10CM (10EA/BX)

## (undated) DEVICE — SENSOR SPO2 NEO LNCS ADHESIVE (20/BX) SEE USER NOTES

## (undated) DEVICE — GLOVE SZ 7 BIOGEL PI MICRO - PF LF (50PR/BX 4BX/CA)

## (undated) DEVICE — WATER IRRIG. STER. 1500 ML - (9/CA)

## (undated) DEVICE — SET EXTENSION WITH 2 PORTS (48EA/CA) ***PART #2C8610 IS A SUBSTITUTE*****

## (undated) DEVICE — PACK MINOR BASIN - (2EA/CA)

## (undated) DEVICE — TUBE E-T HI-LO CUFF 7.0MM (10EA/PK)

## (undated) DEVICE — CANISTER SUCTION 3000ML MECHANICAL FILTER AUTO SHUTOFF MEDI-VAC NONSTERILE LF DISP  (40EA/CA)

## (undated) DEVICE — DRESSING NON-ADHERING 8 X 3 - (50/BX)

## (undated) DEVICE — GLOVE BIOGEL INDICATOR SZ 7SURGICAL PF LTX - (50/BX 4BX/CA)

## (undated) DEVICE — DETERGENT RENUZYME PLUS 10 OZ PACKET (50/BX)

## (undated) DEVICE — TAPE CLOTH MEDIPORE 6 INCH - (12RL/CA)

## (undated) DEVICE — GOWN WARMING STANDARD FLEX - (30/CA)

## (undated) DEVICE — SUTURE GENERAL

## (undated) DEVICE — PROTECTOR ULNA NERVE - (36PR/CA)

## (undated) DEVICE — ELECTRODE 850 FOAM ADHESIVE - HYDROGEL RADIOTRNSPRNT (50/PK)

## (undated) DEVICE — SET LEADWIRE 5 LEAD BEDSIDE DISPOSABLE ECG (1SET OF 5/EA)

## (undated) DEVICE — CHLORAPREP 26 ML APPLICATOR - ORANGE TINT(25/CA)

## (undated) DEVICE — SUCTION INSTRUMENT YANKAUER BULBOUS TIP W/O VENT (50EA/CA)

## (undated) DEVICE — DRESSING INTERCEED ABSORBABLE ADHESION BARRIER TC7 (10EA/CA)

## (undated) DEVICE — SUTURE 1 CHROMIC GUTCT-1 27 (36PK/BX)"

## (undated) DEVICE — PENCIL ELECTSURG 10FT HLSTR - WITH BLADE (50EA/CA)

## (undated) DEVICE — SLEEVE, VASO, THIGH, MED

## (undated) DEVICE — SYRINGE 10 ML CONTROL LL (25EA/BX 4BX/CA)

## (undated) DEVICE — SUTURE 2-0 CHROMIC GUT CT-1 27 (36PK/BX)"